# Patient Record
Sex: FEMALE | Race: WHITE | NOT HISPANIC OR LATINO | Employment: OTHER | URBAN - METROPOLITAN AREA
[De-identification: names, ages, dates, MRNs, and addresses within clinical notes are randomized per-mention and may not be internally consistent; named-entity substitution may affect disease eponyms.]

---

## 2017-08-22 ENCOUNTER — HOSPITAL ENCOUNTER (OUTPATIENT)
Dept: LAB | Facility: MEDICAL CENTER | Age: 67
End: 2017-08-22
Attending: INTERNAL MEDICINE
Payer: MEDICARE

## 2017-08-22 LAB
INR PPP: 2.38 (ref 0.87–1.13)
PROTHROMBIN TIME: 26.7 SEC (ref 12–14.6)

## 2017-08-22 PROCEDURE — 85610 PROTHROMBIN TIME: CPT

## 2017-08-22 PROCEDURE — 36415 COLL VENOUS BLD VENIPUNCTURE: CPT

## 2017-09-20 ENCOUNTER — HOSPITAL ENCOUNTER (OUTPATIENT)
Dept: LAB | Facility: MEDICAL CENTER | Age: 67
End: 2017-09-20
Attending: INTERNAL MEDICINE
Payer: MEDICARE

## 2017-09-20 LAB
INR PPP: 2.69 (ref 0.87–1.13)
PROTHROMBIN TIME: 29.4 SEC (ref 12–14.6)

## 2017-09-20 PROCEDURE — 85610 PROTHROMBIN TIME: CPT

## 2017-09-20 PROCEDURE — 36415 COLL VENOUS BLD VENIPUNCTURE: CPT

## 2017-10-19 ENCOUNTER — HOSPITAL ENCOUNTER (OUTPATIENT)
Dept: LAB | Facility: MEDICAL CENTER | Age: 67
End: 2017-10-19
Attending: INTERNAL MEDICINE
Payer: MEDICARE

## 2017-10-19 LAB
INR PPP: 2.86 (ref 0.87–1.13)
PROTHROMBIN TIME: 30.9 SEC (ref 12–14.6)

## 2017-10-19 PROCEDURE — 85610 PROTHROMBIN TIME: CPT

## 2017-10-19 PROCEDURE — 36415 COLL VENOUS BLD VENIPUNCTURE: CPT

## 2017-11-18 ENCOUNTER — HOSPITAL ENCOUNTER (OUTPATIENT)
Dept: LAB | Facility: MEDICAL CENTER | Age: 67
End: 2017-11-18
Attending: INTERNAL MEDICINE
Payer: MEDICARE

## 2017-11-18 LAB
INR PPP: 3.42 (ref 0.87–1.13)
PROTHROMBIN TIME: 34.2 SEC (ref 12–14.6)

## 2017-11-18 PROCEDURE — 36415 COLL VENOUS BLD VENIPUNCTURE: CPT

## 2017-11-18 PROCEDURE — 85610 PROTHROMBIN TIME: CPT

## 2017-12-11 ENCOUNTER — HOSPITAL ENCOUNTER (OUTPATIENT)
Dept: LAB | Facility: MEDICAL CENTER | Age: 67
End: 2017-12-11
Attending: INTERNAL MEDICINE
Payer: MEDICARE

## 2017-12-11 LAB
INR PPP: 3.01 (ref 0.87–1.13)
PROTHROMBIN TIME: 30.9 SEC (ref 12–14.6)

## 2017-12-11 PROCEDURE — 85610 PROTHROMBIN TIME: CPT

## 2017-12-11 PROCEDURE — 36415 COLL VENOUS BLD VENIPUNCTURE: CPT

## 2018-01-16 ENCOUNTER — HOSPITAL ENCOUNTER (OUTPATIENT)
Dept: LAB | Facility: MEDICAL CENTER | Age: 68
End: 2018-01-16
Attending: INTERNAL MEDICINE
Payer: MEDICARE

## 2018-01-16 LAB
INR PPP: 2.91 (ref 0.87–1.13)
PROTHROMBIN TIME: 30.1 SEC (ref 12–14.6)

## 2018-01-16 PROCEDURE — 36415 COLL VENOUS BLD VENIPUNCTURE: CPT

## 2018-01-16 PROCEDURE — 85610 PROTHROMBIN TIME: CPT

## 2018-02-20 ENCOUNTER — HOSPITAL ENCOUNTER (OUTPATIENT)
Dept: LAB | Facility: MEDICAL CENTER | Age: 68
End: 2018-02-20
Attending: PATHOLOGY
Payer: MEDICARE

## 2018-02-20 LAB
INR PPP: 2.7 (ref 0.87–1.13)
PROTHROMBIN TIME: 28.4 SEC (ref 12–14.6)

## 2018-02-20 PROCEDURE — 85610 PROTHROMBIN TIME: CPT

## 2018-02-20 PROCEDURE — 36415 COLL VENOUS BLD VENIPUNCTURE: CPT

## 2018-03-19 ENCOUNTER — HOSPITAL ENCOUNTER (OUTPATIENT)
Dept: LAB | Facility: MEDICAL CENTER | Age: 68
End: 2018-03-19
Attending: INTERNAL MEDICINE
Payer: MEDICARE

## 2018-03-19 LAB
INR PPP: 3.43 (ref 0.87–1.13)
PROTHROMBIN TIME: 34.3 SEC (ref 12–14.6)

## 2018-03-19 PROCEDURE — 36415 COLL VENOUS BLD VENIPUNCTURE: CPT

## 2018-03-19 PROCEDURE — 85610 PROTHROMBIN TIME: CPT

## 2018-04-03 ENCOUNTER — OFFICE VISIT (OUTPATIENT)
Dept: URGENT CARE | Facility: PHYSICIAN GROUP | Age: 68
End: 2018-04-03
Payer: MEDICARE

## 2018-04-03 VITALS
HEIGHT: 65 IN | OXYGEN SATURATION: 92 % | RESPIRATION RATE: 16 BRPM | DIASTOLIC BLOOD PRESSURE: 90 MMHG | SYSTOLIC BLOOD PRESSURE: 160 MMHG | HEART RATE: 77 BPM | BODY MASS INDEX: 46.65 KG/M2 | TEMPERATURE: 99.1 F | WEIGHT: 280 LBS

## 2018-04-03 DIAGNOSIS — H10.022 PINK EYE DISEASE OF LEFT EYE: ICD-10-CM

## 2018-04-03 DIAGNOSIS — J06.9 VIRAL URI WITH COUGH: ICD-10-CM

## 2018-04-03 LAB
FLUAV+FLUBV AG SPEC QL IA: NEGATIVE
INT CON NEG: NORMAL
INT CON POS: NORMAL

## 2018-04-03 PROCEDURE — 99204 OFFICE O/P NEW MOD 45 MIN: CPT | Performed by: FAMILY MEDICINE

## 2018-04-03 PROCEDURE — 87804 INFLUENZA ASSAY W/OPTIC: CPT | Performed by: FAMILY MEDICINE

## 2018-04-03 RX ORDER — BENZONATATE 200 MG/1
200 CAPSULE ORAL 3 TIMES DAILY PRN
Qty: 60 CAP | Refills: 0 | Status: SHIPPED | OUTPATIENT
Start: 2018-04-03 | End: 2018-10-12

## 2018-04-03 RX ORDER — POLYMYXIN B SULFATE AND TRIMETHOPRIM 1; 10000 MG/ML; [USP'U]/ML
1 SOLUTION OPHTHALMIC EVERY 4 HOURS
Qty: 10 ML | Refills: 0 | Status: SHIPPED | OUTPATIENT
Start: 2018-04-03 | End: 2018-10-12

## 2018-04-03 ASSESSMENT — PAIN SCALES - GENERAL: PAINLEVEL: NO PAIN

## 2018-04-04 NOTE — PROGRESS NOTES
CC:  cough        Cough  This is a new problem. The current episode started 2 days ago. The problem has been unchanged. The problem occurs constantly. The cough is dry. Associated symptoms include : bilat eye redness and some crusty d/c, fatigue, sinus congestion, but she denies any:  muscle aches, fever. Pertinent negatives include no    nausea, vomiting, diarrhea, sweats, weight loss or wheezing. Nothing aggravates the symptoms.  Patient has tried nothing for the symptoms. There is no history of asthma.         Past med hx - hx pulmonary embolus, HTN     Social History   Substance Use Topics   • Smoking status: Never Smoker   • Smokeless tobacco: Never Used   • Alcohol use No         Current Outpatient Prescriptions on File Prior to Visit   Medication Sig Dispense Refill   • lisinopril (PRINIVIL) 20 MG TABS Take 20 mg by mouth every day.     • hydrochlorothiazide (HYDRODIURIL) 25 MG TABS Take 25 mg by mouth every day.     • warfarin (COUMADIN) 2.5 MG TABS Take 2.5 mg by mouth 2 Times a Day.     • oxycodone-acetaminophen (PERCOCET) 5-325 MG TABS Take 1-2 Tabs by mouth every four hours as needed. 20 Tab 0   • ondansetron (ZOFRAN ODT) 4 MG TBDP Take 1 Tab by mouth every 8 hours as needed for Nausea/Vomiting. 20 Tab 0     No current facility-administered medications on file prior to visit.               Family history was reviewed and not pertinent       Review of Systems   Constitutional: Negative for fever and weight loss.   HENT: negative for otalgia, sore throat  Cardiovascular - denies chest pain or dyspnea  Respiratory: Positive for cough.  .  Negative for wheezing.    Neurological: + for headaches.   Denies vertigo  Ext - denies joint pain or leg swelling    GI - denies nausea, vomiting or diarrhea   - denies dysuria, d/c  Psych - denies depression, anxiety  Skin - denies rash or itching  10 point ROS otherwise negative, except per HPI             Objective:     Blood pressure 160/90, pulse 77, temperature  "37.3 °C (99.1 °F), resp. rate 16, height 1.651 m (5' 5\"), weight (!) 127 kg (280 lb), SpO2 92 %.    Physical Exam   Constitutional: patient is oriented to person, place, and time. Patient appears well-developed and well-nourished. No distress.   HENT:   Head: Normocephalic and atraumatic.   Right Ear: External ear normal.   Left Ear: External ear normal.   TMs both clr  Nose: Mucosal edema  present. Right sinus exhibits no maxillary sinus tenderness. Left sinus exhibits no maxillary sinus tenderness.   Mouth/Throat: Mucous membranes are normal. No oral lesions.  No posterior pharyngeal erythema.  No oropharyngeal exudate or posterior oropharyngeal edema.   Eyes: Conjunctivae both injected and also with some purulent d/c.   EOMI and PERRLA   No scleral icterus.   Neck: Normal range of motion. Neck supple. No tracheal deviation present.   Cardiovascular: Normal rate, regular rhythm and normal heart sounds.  Exam reveals no friction rub.    Pulmonary/Chest: Effort normal. No respiratory distress. Patient has no wheezes or rhonchi. Patient has no rales.    Musculoskeletal:  exhibits no edema.   Lymphadenopathy:     Patient has no cervical adenopathy.      Neurological: patient is alert and oriented to person, place, and time.   Skin: Skin is warm and dry. No rash noted. No erythema.   Psychiatric: patient  has a normal mood and affect.  behavior is normal.   Nursing note and vitals reviewed.              Assessment/Plan:       1. Viral URI with cough  Rapid influenza neg  - benzonatate (TESSALON) 200 MG capsule; Take 1 Cap by mouth 3 times a day as needed for Cough.  Dispense: 60 Cap; Refill: 0    2. Pink eye disease of left eye     - polymixin-trimethoprim (POLYTRIM) 45063-5.1 UNIT/ML-% Solution; Place 1 Drop in left eye every 4 hours.  Dispense: 10 mL; Refill: 0       Follow up in one week if no improvement    "

## 2018-04-09 ENCOUNTER — HOSPITAL ENCOUNTER (OUTPATIENT)
Dept: LAB | Facility: MEDICAL CENTER | Age: 68
End: 2018-04-09
Attending: INTERNAL MEDICINE
Payer: MEDICARE

## 2018-04-09 LAB
INR PPP: 3.37 (ref 0.87–1.13)
PROTHROMBIN TIME: 33.8 SEC (ref 12–14.6)

## 2018-04-09 PROCEDURE — 85610 PROTHROMBIN TIME: CPT

## 2018-04-09 PROCEDURE — 36415 COLL VENOUS BLD VENIPUNCTURE: CPT

## 2018-04-23 ENCOUNTER — HOSPITAL ENCOUNTER (OUTPATIENT)
Dept: LAB | Facility: MEDICAL CENTER | Age: 68
End: 2018-04-23
Attending: INTERNAL MEDICINE
Payer: MEDICARE

## 2018-04-23 LAB
INR PPP: 2.79 (ref 0.87–1.13)
PROTHROMBIN TIME: 29.1 SEC (ref 12–14.6)

## 2018-04-23 PROCEDURE — 36415 COLL VENOUS BLD VENIPUNCTURE: CPT

## 2018-04-23 PROCEDURE — 85610 PROTHROMBIN TIME: CPT

## 2018-05-21 ENCOUNTER — HOSPITAL ENCOUNTER (OUTPATIENT)
Dept: LAB | Facility: MEDICAL CENTER | Age: 68
End: 2018-05-21
Attending: INTERNAL MEDICINE
Payer: MEDICARE

## 2018-05-21 LAB
INR PPP: 2.85 (ref 0.87–1.13)
PROTHROMBIN TIME: 29.6 SEC (ref 12–14.6)

## 2018-05-21 PROCEDURE — 36415 COLL VENOUS BLD VENIPUNCTURE: CPT

## 2018-05-21 PROCEDURE — 85610 PROTHROMBIN TIME: CPT

## 2018-06-18 ENCOUNTER — HOSPITAL ENCOUNTER (OUTPATIENT)
Dept: LAB | Facility: MEDICAL CENTER | Age: 68
End: 2018-06-18
Attending: INTERNAL MEDICINE
Payer: MEDICARE

## 2018-06-18 LAB
INR PPP: 2.92 (ref 0.87–1.13)
PROTHROMBIN TIME: 30.2 SEC (ref 12–14.6)

## 2018-06-18 PROCEDURE — 36415 COLL VENOUS BLD VENIPUNCTURE: CPT

## 2018-06-18 PROCEDURE — 85610 PROTHROMBIN TIME: CPT

## 2018-07-16 ENCOUNTER — HOSPITAL ENCOUNTER (OUTPATIENT)
Dept: LAB | Facility: MEDICAL CENTER | Age: 68
End: 2018-07-16
Attending: INTERNAL MEDICINE
Payer: MEDICARE

## 2018-07-16 LAB
INR PPP: 3.15 (ref 0.87–1.13)
PROTHROMBIN TIME: 32.1 SEC (ref 12–14.6)

## 2018-07-16 PROCEDURE — 85610 PROTHROMBIN TIME: CPT

## 2018-07-16 PROCEDURE — 36415 COLL VENOUS BLD VENIPUNCTURE: CPT

## 2018-08-15 ENCOUNTER — HOSPITAL ENCOUNTER (OUTPATIENT)
Dept: LAB | Facility: MEDICAL CENTER | Age: 68
End: 2018-08-15
Attending: INTERNAL MEDICINE
Payer: MEDICARE

## 2018-08-15 LAB
INR PPP: 3.15 (ref 0.87–1.13)
PROTHROMBIN TIME: 32.1 SEC (ref 12–14.6)

## 2018-08-15 PROCEDURE — 85610 PROTHROMBIN TIME: CPT

## 2018-08-15 PROCEDURE — 36415 COLL VENOUS BLD VENIPUNCTURE: CPT

## 2018-09-17 ENCOUNTER — HOSPITAL ENCOUNTER (OUTPATIENT)
Dept: LAB | Facility: MEDICAL CENTER | Age: 68
End: 2018-09-17
Attending: INTERNAL MEDICINE
Payer: MEDICARE

## 2018-09-17 LAB
INR PPP: 3.24 (ref 0.87–1.13)
PROTHROMBIN TIME: 32.8 SEC (ref 12–14.6)

## 2018-09-17 PROCEDURE — 36415 COLL VENOUS BLD VENIPUNCTURE: CPT

## 2018-09-17 PROCEDURE — 85610 PROTHROMBIN TIME: CPT

## 2018-10-12 ENCOUNTER — APPOINTMENT (OUTPATIENT)
Dept: RADIOLOGY | Facility: MEDICAL CENTER | Age: 68
DRG: 813 | End: 2018-10-12
Attending: EMERGENCY MEDICINE
Payer: MEDICARE

## 2018-10-12 ENCOUNTER — HOSPITAL ENCOUNTER (INPATIENT)
Facility: MEDICAL CENTER | Age: 68
LOS: 2 days | DRG: 813 | End: 2018-10-14
Attending: EMERGENCY MEDICINE | Admitting: HOSPITALIST
Payer: MEDICARE

## 2018-10-12 DIAGNOSIS — Z79.01 ANTICOAGULATED ON COUMADIN: ICD-10-CM

## 2018-10-12 DIAGNOSIS — D62 ACUTE BLOOD LOSS ANEMIA: ICD-10-CM

## 2018-10-12 DIAGNOSIS — K92.1 HEMATOCHEZIA: ICD-10-CM

## 2018-10-12 DIAGNOSIS — K57.32 SIGMOID DIVERTICULITIS: ICD-10-CM

## 2018-10-12 DIAGNOSIS — K57.33 DIVERTICULITIS OF LARGE INTESTINE WITHOUT PERFORATION OR ABSCESS WITH BLEEDING: ICD-10-CM

## 2018-10-12 DIAGNOSIS — Z86.711 HISTORY OF PULMONARY EMBOLISM: ICD-10-CM

## 2018-10-12 PROBLEM — D68.9 COAGULOPATHY (HCC): Status: ACTIVE | Noted: 2018-10-12

## 2018-10-12 PROBLEM — K62.5 RECTAL BLEEDING: Status: ACTIVE | Noted: 2018-10-12

## 2018-10-12 PROBLEM — R73.9 HYPERGLYCEMIA: Status: ACTIVE | Noted: 2018-10-12

## 2018-10-12 PROBLEM — E66.09 OBESITY DUE TO EXCESS CALORIES: Status: ACTIVE | Noted: 2018-10-12

## 2018-10-12 PROBLEM — K57.92 ACUTE DIVERTICULITIS: Status: ACTIVE | Noted: 2018-10-12

## 2018-10-12 PROBLEM — Q21.0 VSD (VENTRICULAR SEPTAL DEFECT): Status: ACTIVE | Noted: 2018-10-12

## 2018-10-12 PROBLEM — I10 ESSENTIAL HYPERTENSION: Status: ACTIVE | Noted: 2018-10-12

## 2018-10-12 LAB
ABO GROUP BLD: NORMAL
ABO GROUP BLD: NORMAL
ALBUMIN SERPL BCP-MCNC: 3.8 G/DL (ref 3.2–4.9)
ALBUMIN/GLOB SERPL: 1.1 G/DL
ALP SERPL-CCNC: 80 U/L (ref 30–99)
ALT SERPL-CCNC: 14 U/L (ref 2–50)
ANION GAP SERPL CALC-SCNC: 10 MMOL/L (ref 0–11.9)
APTT PPP: 45.1 SEC (ref 24.7–36)
AST SERPL-CCNC: 15 U/L (ref 12–45)
BARCODED ABORH UBTYP: 6200
BARCODED PRD CODE UBPRD: NORMAL
BARCODED UNIT NUM UBUNT: NORMAL
BASOPHILS # BLD AUTO: 0.6 % (ref 0–1.8)
BASOPHILS # BLD: 0.05 K/UL (ref 0–0.12)
BILIRUB SERPL-MCNC: 0.4 MG/DL (ref 0.1–1.5)
BLD GP AB SCN SERPL QL: NORMAL
BUN SERPL-MCNC: 13 MG/DL (ref 8–22)
CALCIUM SERPL-MCNC: 9.2 MG/DL (ref 8.5–10.5)
CFT BLD TEG: 4.8 MIN (ref 5–10)
CHLORIDE SERPL-SCNC: 106 MMOL/L (ref 96–112)
CLOT ANGLE BLD TEG: 71.2 DEGREES (ref 53–72)
CLOT LYSIS 30M P MA LENFR BLD TEG: 0 % (ref 0–8)
CO2 SERPL-SCNC: 25 MMOL/L (ref 20–33)
COMPONENT FT 8504FT: NORMAL
COMPONENT FT 8504FT: NORMAL
COMPONENT R 8504R: NORMAL
COMPONENT R 8504R: NORMAL
CREAT SERPL-MCNC: 0.66 MG/DL (ref 0.5–1.4)
CT.EXTRINSIC BLD ROTEM: 1.3 MIN (ref 1–3)
EOSINOPHIL # BLD AUTO: 0.25 K/UL (ref 0–0.51)
EOSINOPHIL NFR BLD: 3.2 % (ref 0–6.9)
ERYTHROCYTE [DISTWIDTH] IN BLOOD BY AUTOMATED COUNT: 47.9 FL (ref 35.9–50)
ERYTHROCYTE [DISTWIDTH] IN BLOOD BY AUTOMATED COUNT: 48.9 FL (ref 35.9–50)
EST. AVERAGE GLUCOSE BLD GHB EST-MCNC: 128 MG/DL
GLOBULIN SER CALC-MCNC: 3.4 G/DL (ref 1.9–3.5)
GLUCOSE SERPL-MCNC: 114 MG/DL (ref 65–99)
HBA1C MFR BLD: 6.1 % (ref 0–5.6)
HCT VFR BLD AUTO: 31.2 % (ref 37–47)
HCT VFR BLD AUTO: 39.2 % (ref 37–47)
HGB BLD-MCNC: 10.1 G/DL (ref 12–16)
HGB BLD-MCNC: 11 G/DL (ref 12–16)
HGB BLD-MCNC: 12.3 G/DL (ref 12–16)
HGB BLD-MCNC: 9.7 G/DL (ref 12–16)
IMM GRANULOCYTES # BLD AUTO: 0.03 K/UL (ref 0–0.11)
IMM GRANULOCYTES NFR BLD AUTO: 0.4 % (ref 0–0.9)
INR PPP: 1.82 (ref 0.87–1.13)
INR PPP: 3.09 (ref 0.87–1.13)
LACTATE BLD-SCNC: 1.8 MMOL/L (ref 0.5–2)
LYMPHOCYTES # BLD AUTO: 2.06 K/UL (ref 1–4.8)
LYMPHOCYTES NFR BLD: 26.7 % (ref 22–41)
MCF BLD TEG: 70.7 MM (ref 50–70)
MCH RBC QN AUTO: 29.6 PG (ref 27–33)
MCH RBC QN AUTO: 30.6 PG (ref 27–33)
MCHC RBC AUTO-ENTMCNC: 31.4 G/DL (ref 33.6–35)
MCHC RBC AUTO-ENTMCNC: 32.4 G/DL (ref 33.6–35)
MCV RBC AUTO: 94.2 FL (ref 81.4–97.8)
MCV RBC AUTO: 94.5 FL (ref 81.4–97.8)
MONOCYTES # BLD AUTO: 0.6 K/UL (ref 0–0.85)
MONOCYTES NFR BLD AUTO: 7.8 % (ref 0–13.4)
NEUTROPHILS # BLD AUTO: 4.73 K/UL (ref 2–7.15)
NEUTROPHILS NFR BLD: 61.3 % (ref 44–72)
NRBC # BLD AUTO: 0 K/UL
NRBC BLD-RTO: 0 /100 WBC
PA AA BLD-ACNC: 0 %
PA ADP BLD-ACNC: 4.3 %
PLATELET # BLD AUTO: 235 K/UL (ref 164–446)
PLATELET # BLD AUTO: 270 K/UL (ref 164–446)
PMV BLD AUTO: 9.2 FL (ref 9–12.9)
PMV BLD AUTO: 9.3 FL (ref 9–12.9)
POTASSIUM SERPL-SCNC: 3.7 MMOL/L (ref 3.6–5.5)
PRODUCT TYPE UPROD: NORMAL
PROT SERPL-MCNC: 7.2 G/DL (ref 6–8.2)
PROTHROMBIN TIME: 21.2 SEC (ref 12–14.6)
PROTHROMBIN TIME: 31.9 SEC (ref 12–14.6)
RBC # BLD AUTO: 3.3 M/UL (ref 4.2–5.4)
RBC # BLD AUTO: 4.16 M/UL (ref 4.2–5.4)
RH BLD: NORMAL
RH BLD: NORMAL
SODIUM SERPL-SCNC: 141 MMOL/L (ref 135–145)
TEG ALGORITHM TGALG: ABNORMAL
UNIT STATUS USTAT: NORMAL
WBC # BLD AUTO: 7.7 K/UL (ref 4.8–10.8)
WBC # BLD AUTO: 8.2 K/UL (ref 4.8–10.8)

## 2018-10-12 PROCEDURE — 700105 HCHG RX REV CODE 258: Performed by: EMERGENCY MEDICINE

## 2018-10-12 PROCEDURE — P9017 PLASMA 1 DONOR FRZ W/IN 8 HR: HCPCS

## 2018-10-12 PROCEDURE — 85384 FIBRINOGEN ACTIVITY: CPT

## 2018-10-12 PROCEDURE — 85027 COMPLETE CBC AUTOMATED: CPT

## 2018-10-12 PROCEDURE — P9016 RBC LEUKOCYTES REDUCED: HCPCS | Mod: 91

## 2018-10-12 PROCEDURE — 83036 HEMOGLOBIN GLYCOSYLATED A1C: CPT

## 2018-10-12 PROCEDURE — 36415 COLL VENOUS BLD VENIPUNCTURE: CPT

## 2018-10-12 PROCEDURE — 86850 RBC ANTIBODY SCREEN: CPT

## 2018-10-12 PROCEDURE — 700105 HCHG RX REV CODE 258: Performed by: HOSPITALIST

## 2018-10-12 PROCEDURE — 700111 HCHG RX REV CODE 636 W/ 250 OVERRIDE (IP): Performed by: HOSPITALIST

## 2018-10-12 PROCEDURE — 74177 CT ABD & PELVIS W/CONTRAST: CPT

## 2018-10-12 PROCEDURE — 85730 THROMBOPLASTIN TIME PARTIAL: CPT

## 2018-10-12 PROCEDURE — 82272 OCCULT BLD FECES 1-3 TESTS: CPT

## 2018-10-12 PROCEDURE — 36430 TRANSFUSION BLD/BLD COMPNT: CPT

## 2018-10-12 PROCEDURE — 85018 HEMOGLOBIN: CPT | Mod: 91

## 2018-10-12 PROCEDURE — 770022 HCHG ROOM/CARE - ICU (200)

## 2018-10-12 PROCEDURE — 85347 COAGULATION TIME ACTIVATED: CPT

## 2018-10-12 PROCEDURE — 96368 THER/DIAG CONCURRENT INF: CPT

## 2018-10-12 PROCEDURE — 85576 BLOOD PLATELET AGGREGATION: CPT

## 2018-10-12 PROCEDURE — 96365 THER/PROPH/DIAG IV INF INIT: CPT

## 2018-10-12 PROCEDURE — 80053 COMPREHEN METABOLIC PANEL: CPT

## 2018-10-12 PROCEDURE — 83605 ASSAY OF LACTIC ACID: CPT

## 2018-10-12 PROCEDURE — 30233K1 TRANSFUSION OF NONAUTOLOGOUS FROZEN PLASMA INTO PERIPHERAL VEIN, PERCUTANEOUS APPROACH: ICD-10-PCS | Performed by: EMERGENCY MEDICINE

## 2018-10-12 PROCEDURE — 700101 HCHG RX REV CODE 250: Performed by: EMERGENCY MEDICINE

## 2018-10-12 PROCEDURE — 700101 HCHG RX REV CODE 250: Performed by: HOSPITALIST

## 2018-10-12 PROCEDURE — 86923 COMPATIBILITY TEST ELECTRIC: CPT

## 2018-10-12 PROCEDURE — 86901 BLOOD TYPING SEROLOGIC RH(D): CPT

## 2018-10-12 PROCEDURE — 85025 COMPLETE CBC W/AUTO DIFF WBC: CPT

## 2018-10-12 PROCEDURE — 99223 1ST HOSP IP/OBS HIGH 75: CPT | Mod: AI | Performed by: HOSPITALIST

## 2018-10-12 PROCEDURE — 99291 CRITICAL CARE FIRST HOUR: CPT | Performed by: INTERNAL MEDICINE

## 2018-10-12 PROCEDURE — 700111 HCHG RX REV CODE 636 W/ 250 OVERRIDE (IP): Performed by: EMERGENCY MEDICINE

## 2018-10-12 PROCEDURE — 99291 CRITICAL CARE FIRST HOUR: CPT

## 2018-10-12 PROCEDURE — 85610 PROTHROMBIN TIME: CPT | Mod: 91

## 2018-10-12 PROCEDURE — 700117 HCHG RX CONTRAST REV CODE 255: Performed by: EMERGENCY MEDICINE

## 2018-10-12 PROCEDURE — 30233N1 TRANSFUSION OF NONAUTOLOGOUS RED BLOOD CELLS INTO PERIPHERAL VEIN, PERCUTANEOUS APPROACH: ICD-10-PCS | Performed by: EMERGENCY MEDICINE

## 2018-10-12 PROCEDURE — 86900 BLOOD TYPING SEROLOGIC ABO: CPT

## 2018-10-12 RX ORDER — ONDANSETRON 4 MG/1
4 TABLET, ORALLY DISINTEGRATING ORAL EVERY 4 HOURS PRN
Status: DISCONTINUED | OUTPATIENT
Start: 2018-10-12 | End: 2018-10-14 | Stop reason: HOSPADM

## 2018-10-12 RX ORDER — WARFARIN SODIUM 5 MG/1
5-7.5 TABLET ORAL
Status: ON HOLD | COMMUNITY
End: 2018-10-14

## 2018-10-12 RX ORDER — ACETAMINOPHEN 325 MG/1
650 TABLET ORAL EVERY 6 HOURS PRN
Status: DISCONTINUED | OUTPATIENT
Start: 2018-10-12 | End: 2018-10-14 | Stop reason: HOSPADM

## 2018-10-12 RX ORDER — ONDANSETRON 2 MG/ML
4 INJECTION INTRAMUSCULAR; INTRAVENOUS EVERY 4 HOURS PRN
Status: DISCONTINUED | OUTPATIENT
Start: 2018-10-12 | End: 2018-10-14 | Stop reason: HOSPADM

## 2018-10-12 RX ORDER — OXYCODONE HYDROCHLORIDE AND ACETAMINOPHEN 5; 325 MG/1; MG/1
1 TABLET ORAL EVERY 6 HOURS PRN
Status: DISCONTINUED | OUTPATIENT
Start: 2018-10-12 | End: 2018-10-14 | Stop reason: HOSPADM

## 2018-10-12 RX ORDER — CIPROFLOXACIN 2 MG/ML
400 INJECTION, SOLUTION INTRAVENOUS ONCE
Status: DISCONTINUED | OUTPATIENT
Start: 2018-10-12 | End: 2018-10-12

## 2018-10-12 RX ORDER — SODIUM CHLORIDE 9 MG/ML
INJECTION, SOLUTION INTRAVENOUS CONTINUOUS
Status: DISCONTINUED | OUTPATIENT
Start: 2018-10-12 | End: 2018-10-13

## 2018-10-12 RX ORDER — SODIUM CHLORIDE 9 MG/ML
1000 INJECTION, SOLUTION INTRAVENOUS ONCE
Status: COMPLETED | OUTPATIENT
Start: 2018-10-12 | End: 2018-10-12

## 2018-10-12 RX ORDER — BENZONATATE 100 MG/1
200 CAPSULE ORAL 3 TIMES DAILY PRN
Status: DISCONTINUED | OUTPATIENT
Start: 2018-10-12 | End: 2018-10-14 | Stop reason: HOSPADM

## 2018-10-12 RX ORDER — PHYTONADIONE 10 MG/ML
10 INJECTION, EMULSION INTRAMUSCULAR; INTRAVENOUS; SUBCUTANEOUS ONCE
Status: DISCONTINUED | OUTPATIENT
Start: 2018-10-12 | End: 2018-10-12

## 2018-10-12 RX ADMIN — CEFTRIAXONE SODIUM 2 G: 2 INJECTION, POWDER, FOR SOLUTION INTRAMUSCULAR; INTRAVENOUS at 10:46

## 2018-10-12 RX ADMIN — PHYTONADIONE 10 MG: 10 INJECTION, EMULSION INTRAMUSCULAR; INTRAVENOUS; SUBCUTANEOUS at 10:34

## 2018-10-12 RX ADMIN — METRONIDAZOLE 500 MG: 500 INJECTION, SOLUTION INTRAVENOUS at 11:49

## 2018-10-12 RX ADMIN — METRONIDAZOLE 500 MG: 500 INJECTION, SOLUTION INTRAVENOUS at 18:05

## 2018-10-12 RX ADMIN — SODIUM CHLORIDE 1000 ML: 9 INJECTION, SOLUTION INTRAVENOUS at 08:15

## 2018-10-12 RX ADMIN — SODIUM CHLORIDE: 9 INJECTION, SOLUTION INTRAVENOUS at 11:45

## 2018-10-12 RX ADMIN — SODIUM CHLORIDE: 9 INJECTION, SOLUTION INTRAVENOUS at 22:06

## 2018-10-12 RX ADMIN — IOHEXOL 100 ML: 350 INJECTION, SOLUTION INTRAVENOUS at 09:29

## 2018-10-12 ASSESSMENT — ENCOUNTER SYMPTOMS
CONSTITUTIONAL NEGATIVE: 1
RECTAL PAIN: 0
HEADACHES: 0
VOMITING: 0
FEVER: 0
BRUISES/BLEEDS EASILY: 1
COUGH: 0
DIARRHEA: 1
BACK PAIN: 0
NUMBNESS: 0
NERVOUS/ANXIOUS: 0
NAUSEA: 0
FATIGUE: 1
LIGHT-HEADEDNESS: 1
APPETITE CHANGE: 0
ACTIVITY CHANGE: 0
ADENOPATHY: 0
FLANK PAIN: 0
PHOTOPHOBIA: 0
CHILLS: 0
SINUS PRESSURE: 0
POLYDIPSIA: 0
BLOOD IN STOOL: 1
PALPITATIONS: 0
WEAKNESS: 0
SHORTNESS OF BREATH: 0
DIZZINESS: 1
ABDOMINAL PAIN: 1
CONFUSION: 0
NECK PAIN: 0

## 2018-10-12 ASSESSMENT — COGNITIVE AND FUNCTIONAL STATUS - GENERAL
SUGGESTED CMS G CODE MODIFIER DAILY ACTIVITY: CH
MOBILITY SCORE: 24
DAILY ACTIVITIY SCORE: 24
SUGGESTED CMS G CODE MODIFIER MOBILITY: CH

## 2018-10-12 ASSESSMENT — PATIENT HEALTH QUESTIONNAIRE - PHQ9
SUM OF ALL RESPONSES TO PHQ9 QUESTIONS 1 AND 2: 0
SUM OF ALL RESPONSES TO PHQ9 QUESTIONS 1 AND 2: 0
1. LITTLE INTEREST OR PLEASURE IN DOING THINGS: NOT AT ALL
2. FEELING DOWN, DEPRESSED, IRRITABLE, OR HOPELESS: NOT AT ALL
1. LITTLE INTEREST OR PLEASURE IN DOING THINGS: NOT AT ALL
2. FEELING DOWN, DEPRESSED, IRRITABLE, OR HOPELESS: NOT AT ALL

## 2018-10-12 ASSESSMENT — COPD QUESTIONNAIRES
DURING THE PAST 4 WEEKS HOW MUCH DID YOU FEEL SHORT OF BREATH: NONE/LITTLE OF THE TIME
DO YOU EVER COUGH UP ANY MUCUS OR PHLEGM?: NO/ONLY WITH OCCASIONAL COLDS OR INFECTIONS
HAVE YOU SMOKED AT LEAST 100 CIGARETTES IN YOUR ENTIRE LIFE: NO/DON'T KNOW
COPD SCREENING SCORE: 2

## 2018-10-12 ASSESSMENT — PAIN SCALES - GENERAL
PAINLEVEL_OUTOF10: 0

## 2018-10-12 ASSESSMENT — LIFESTYLE VARIABLES
DO YOU DRINK ALCOHOL: NO
EVER_SMOKED: NEVER

## 2018-10-12 NOTE — ASSESSMENT & PLAN NOTE
Chronic for pulmonary embolism  Status post vitamin K and FFP  Holding Coumadin for now until cleared by GI as outpatient

## 2018-10-12 NOTE — ASSESSMENT & PLAN NOTE
Continue antibiotic (changed to p.o. Augmentin), analgesics as needed  Diet modification instructions given

## 2018-10-12 NOTE — ASSESSMENT & PLAN NOTE
Following knee replacement surgery 5 years ago.  She had a DVT 25 years ago.  He has had an IVC filter placed in the past.

## 2018-10-12 NOTE — ED NOTES
Report given to Raffaele MULTANI at bedside, may call Sharla MULTANI at 2001 for additional questions. Patient to ICU with RN and tech with telemonitoring with belongings and family by adebayo.

## 2018-10-12 NOTE — ASSESSMENT & PLAN NOTE
Provoked after knee surgery 6 years ago  Holding anticoagulants for now until cleared outpatient by GI

## 2018-10-12 NOTE — CARE PLAN
Problem: Hemodynamic Status  Goal: Vital Signs and Fluid Balance Management    Intervention: Hemodynamic Monitoring  Hgb monitoring q 8 hours.  2 units PRBC and 2 units FFP administered this morning in ED.        Problem: Risk for Deep Vein Thrombosis/Venous Thromboembolism  Goal: DVT/VTE Prevention Measures in Place    Intervention: Intermittent sequential compression device in place 23 hours per day  SCDs in place.  Home Coumadin discontinued r/t GIB.  Encouraging patient to pump feet while in bed.

## 2018-10-12 NOTE — PROGRESS NOTES
Dr. Reagan, GI MD at bedside. Ok to start patient on clear liquid diet.  No interventions planned at this time.

## 2018-10-12 NOTE — ED NOTES
Inpatient Anticoagulation Service Note    Date: 10/12/2018  Reason for Anticoagulation: Pulmonary Embolism        Hemoglobin Value: 12.3  Hematocrit Value: 39.2  Lab Platelet Value: 270    INR from last 7 days     Date/Time INR Value    10/12/18 0752 (!)  3.09        Dose from last 7 days     Date/Time Dose (mg)    10/12/18 0900  42.5        Average Dose (mg): 6.07    Verified home dose with pt: 7.5 mg on Sat, Tues, Thurs and 5 mg all other days.  Pt currently supra-therapeutic and experiencing red bloody stools and abdominal cramping.    Plan:  Hold warfarin dose and re-check INR tomorrow.  Education Material Provided?: No  Pharmacist suggested discharge dosin.5 mg on Sat, Tues, Thurs and 5 mg all other days.     Qian Gonzalez

## 2018-10-12 NOTE — CONSULTS
Critical Care Consultation    Date of consult: 10/12/2018    Referring Physician  Emili Avelar D.O.    Reason for Consultation  Gastrointestinal hemorrhage    History of Presenting Illness  68 y.o. female who presented 10/12/2018 with a past medical history significant for pulmonary embolism on Coumadin as well as a VSD with intermittent congestive heart failure who presents to the emergency department today after a large bloody bowel movement with associated left lower quadrant abdominal pain that started this morning.  Her abdominal was described as pure bright red blood and she had associated lightheadedness, diaphoresis, dizziness.  In the emergency department she was found to have 2 other episodes of moderate amount of bloody stool and had a drop in her hemoglobin from 12-10.  She was given vitamin K as well as 2 units of FFP and is receiving her second unit of packed red cells at the time of my evaluation.  She states the left lower quadrant abdominal pain has not resolved after the bowel movements and she denies any prior gastrointestinal hemorrhages nor knowledge of diverticulosis.  No family history that she is aware of of colon cancer and she has never had a colonoscopy herself.  She denies any hematemesis no nausea.    Code Status  Full Code    Review of Systems  Review of Systems   Constitutional: Positive for fatigue. Negative for activity change, appetite change, chills and fever.   HENT: Negative for nosebleeds and sinus pressure.    Eyes: Negative for photophobia.   Respiratory: Negative for cough and shortness of breath.    Cardiovascular: Positive for leg swelling. Negative for chest pain and palpitations.   Gastrointestinal: Positive for abdominal pain, blood in stool and diarrhea. Negative for nausea, rectal pain and vomiting.   Endocrine: Negative for polydipsia.   Genitourinary: Negative for difficulty urinating, flank pain and vaginal bleeding.   Musculoskeletal: Negative for back pain  and neck pain.   Skin: Positive for pallor.   Allergic/Immunologic: Negative for immunocompromised state.   Neurological: Positive for dizziness and light-headedness. Negative for weakness, numbness and headaches.   Hematological: Negative for adenopathy. Bruises/bleeds easily.   Psychiatric/Behavioral: Negative for confusion. The patient is not nervous/anxious.    All other systems reviewed and are negative.      Past Medical History   has a past medical history of Hypertension and VSD (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 She also has no past medical history of Diabetes (HCC) or Renal disorder.    Surgical History   has no past surgical history on file. -Hysterectomy    Family History  family history is not on file.     Social History   reports that she has never smoked. She has never used smokeless tobacco. She reports that she does not drink alcohol or use drugs.    Medications  Home Medications    **Home medications have not yet been reviewed for this encounter**       Current Facility-Administered Medications   Medication Dose Route Frequency Provider Last Rate Last Dose   • metroNIDAZOLE (FLAGYL) IVPB 500 mg  500 mg Intravenous Once Emili Avelar D.O.       • phytonadione (AQUA-MEPHYTON) 10 mg in NS 50 mL IVPB  10 mg Intravenous Once Emili Avelar, D.O. 50 mL/hr at 10/12/18 1034 10 mg at 10/12/18 1034   • benzonatate (TESSALON) capsule 200 mg  200 mg Oral TID PRN Petar Maguire M.D.       • Respiratory Care per Protocol   Nebulization Continuous RT Petar Maguire M.D.       • NS infusion   Intravenous Continuous Petar Maguire M.D.       • ondansetron (ZOFRAN) syringe/vial injection 4 mg  4 mg Intravenous Q4HRS PRN Petar Maguire M.D.       • ondansetron (ZOFRAN ODT) dispertab 4 mg  4 mg Oral Q4HRS PRN Petar Maguire M.D.       • acetaminophen (TYLENOL) tablet 650 mg  650 mg Oral Q6HRS PRN Petar Maguire M.D.       • oxyCODONE-acetaminophen  (PERCOCET) 5-325 MG per tablet 1 Tab  1 Tab Oral Q6HRS PRN Petar Maguire M.D.       • cefTRIAXone (ROCEPHIN) 2 g in  mL IVPB  2 g Intravenous Q24HRS Petar Maguire M.D. 200 mL/hr at 10/12/18 1046 2 g at 10/12/18 1046   • metroNIDAZOLE (FLAGYL) IVPB 500 mg  500 mg Intravenous Q8HRS Petar Maguire M.D.         Current Outpatient Prescriptions   Medication Sig Dispense Refill   • warfarin (COUMADIN) 5 MG Tab Take 5-7.5 mg by mouth every day. 7.5 mg on Sat, Tues and Thurs and 5 mg on all other days.     • polymixin-trimethoprim (POLYTRIM) 62762-1.1 UNIT/ML-% Solution Place 1 Drop in left eye every 4 hours. 10 mL 0   • benzonatate (TESSALON) 200 MG capsule Take 1 Cap by mouth 3 times a day as needed for Cough. 60 Cap 0   • oxycodone-acetaminophen (PERCOCET) 5-325 MG TABS Take 1-2 Tabs by mouth every four hours as needed. 20 Tab 0   • ondansetron (ZOFRAN ODT) 4 MG TBDP Take 1 Tab by mouth every 8 hours as needed for Nausea/Vomiting. 20 Tab 0   • lisinopril (PRINIVIL) 20 MG TABS Take 20 mg by mouth every day.     • hydrochlorothiazide (HYDRODIURIL) 25 MG TABS Take 25 mg by mouth every day.         Allergies  Allergies   Allergen Reactions   • Compazine Anaphylaxis       Vital Signs last 24 hours  Temp:  [35.9 °C (96.6 °F)-36.5 °C (97.7 °F)] 36.5 °C (97.7 °F)  Pulse:  [] 78  Resp:  [18-20] 18  BP: ()/() 121/65    Physical Exam  Physical Exam   Constitutional: She is oriented to person, place, and time. She appears well-developed and well-nourished. No distress.   Obese, very pleasant female   HENT:   Head: Normocephalic and atraumatic.   Mouth/Throat: Mucous membranes are pale and dry.   Eyes: Pupils are equal, round, and reactive to light. Conjunctivae are normal. No scleral icterus.   Neck: Neck supple. No JVD present. No tracheal deviation present.   Cardiovascular: Normal rate, regular rhythm and intact distal pulses.  PMI is displaced.  Exam reveals distant heart sounds.     Murmur heard.  Pulmonary/Chest: Effort normal and breath sounds normal. No respiratory distress. She has no wheezes. She has no rales.   Abdominal: Soft. Bowel sounds are normal. She exhibits no distension. There is tenderness. There is no rebound and no guarding.   Musculoskeletal: She exhibits edema. She exhibits no tenderness or deformity.   Neurological: She is alert and oriented to person, place, and time. No cranial nerve deficit.   Skin: Skin is warm and dry. She is not diaphoretic. There is pallor.   Psychiatric: She has a normal mood and affect. Her behavior is normal. Judgment and thought content normal.       Fluids    Intake/Output Summary (Last 24 hours) at 10/12/18 1102  Last data filed at 10/12/18 1057   Gross per 24 hour   Intake             2000 ml   Output             1700 ml   Net              300 ml       Laboratory  Recent Results (from the past 48 hour(s))   CBC WITH DIFFERENTIAL    Collection Time: 10/12/18  7:52 AM   Result Value Ref Range    WBC 7.7 4.8 - 10.8 K/uL    RBC 4.16 (L) 4.20 - 5.40 M/uL    Hemoglobin 12.3 12.0 - 16.0 g/dL    Hematocrit 39.2 37.0 - 47.0 %    MCV 94.2 81.4 - 97.8 fL    MCH 29.6 27.0 - 33.0 pg    MCHC 31.4 (L) 33.6 - 35.0 g/dL    RDW 48.9 35.9 - 50.0 fL    Platelet Count 270 164 - 446 K/uL    MPV 9.3 9.0 - 12.9 fL    Neutrophils-Polys 61.30 44.00 - 72.00 %    Lymphocytes 26.70 22.00 - 41.00 %    Monocytes 7.80 0.00 - 13.40 %    Eosinophils 3.20 0.00 - 6.90 %    Basophils 0.60 0.00 - 1.80 %    Immature Granulocytes 0.40 0.00 - 0.90 %    Nucleated RBC 0.00 /100 WBC    Neutrophils (Absolute) 4.73 2.00 - 7.15 K/uL    Lymphs (Absolute) 2.06 1.00 - 4.80 K/uL    Monos (Absolute) 0.60 0.00 - 0.85 K/uL    Eos (Absolute) 0.25 0.00 - 0.51 K/uL    Baso (Absolute) 0.05 0.00 - 0.12 K/uL    Immature Granulocytes (abs) 0.03 0.00 - 0.11 K/uL    NRBC (Absolute) 0.00 K/uL   COMP METABOLIC PANEL    Collection Time: 10/12/18  7:52 AM   Result Value Ref Range    Sodium 141 135 - 145  mmol/L    Potassium 3.7 3.6 - 5.5 mmol/L    Chloride 106 96 - 112 mmol/L    Co2 25 20 - 33 mmol/L    Anion Gap 10.0 0.0 - 11.9    Glucose 114 (H) 65 - 99 mg/dL    Bun 13 8 - 22 mg/dL    Creatinine 0.66 0.50 - 1.40 mg/dL    Calcium 9.2 8.5 - 10.5 mg/dL    AST(SGOT) 15 12 - 45 U/L    ALT(SGPT) 14 2 - 50 U/L    Alkaline Phosphatase 80 30 - 99 U/L    Total Bilirubin 0.4 0.1 - 1.5 mg/dL    Albumin 3.8 3.2 - 4.9 g/dL    Total Protein 7.2 6.0 - 8.2 g/dL    Globulin 3.4 1.9 - 3.5 g/dL    A-G Ratio 1.1 g/dL   PROTHROMBIN TIME (INR)    Collection Time: 10/12/18  7:52 AM   Result Value Ref Range    PT 31.9 (H) 12.0 - 14.6 sec    INR 3.09 (H) 0.87 - 1.13   APTT    Collection Time: 10/12/18  7:52 AM   Result Value Ref Range    APTT 45.1 (H) 24.7 - 36.0 sec   COD (ADULT)    Collection Time: 10/12/18  7:52 AM   Result Value Ref Range    ABO Grouping Only A     Rh Grouping Only POS     Antibody Screen-Cod NEG     Component R       R3                  Red Blood Cells3    [id number]   issued       10/12/18   09:58      Product Type Red Blood Cells LR Pheresis     Dispense Status Issued     Unit Number (Barcoded) [id number]     Product Code (Barcoded) I5531Q28     Blood Type (Barcoded) 6200     Component R       R3                  Red Blood Cells3    [id number]   issued       10/12/18   10:44      Product Type Red Blood Cells LR Pheresis     Dispense Status Issued     Unit Number (Barcoded) [id number]     Product Code (Barcoded) O8527G72     Blood Type (Barcoded) 6200    ESTIMATED GFR    Collection Time: 10/12/18  7:52 AM   Result Value Ref Range    GFR If African American >60 >60 mL/min/1.73 m 2    GFR If Non African American >60 >60 mL/min/1.73 m 2   ABO AND RH CONFIRMATION    Collection Time: 10/12/18  8:41 AM   Result Value Ref Range    ABO Confirm A     Second Rh Group POS    LACTIC ACID    Collection Time: 10/12/18  8:41 AM   Result Value Ref Range    Lactic Acid 1.8 0.5 - 2.0 mmol/L   CBC WITHOUT DIFFERENTIAL     Collection Time: 10/12/18  9:52 AM   Result Value Ref Range    WBC 8.2 4.8 - 10.8 K/uL    RBC 3.30 (L) 4.20 - 5.40 M/uL    Hemoglobin 10.1 (L) 12.0 - 16.0 g/dL    Hematocrit 31.2 (L) 37.0 - 47.0 %    MCV 94.5 81.4 - 97.8 fL    MCH 30.6 27.0 - 33.0 pg    MCHC 32.4 (L) 33.6 - 35.0 g/dL    RDW 47.9 35.9 - 50.0 fL    Platelet Count 235 164 - 446 K/uL    MPV 9.2 9.0 - 12.9 fL   FRESH FROZEN PLASMA    Collection Time: 10/12/18  9:52 AM   Result Value Ref Range    Component Ft       TA                  Thawed Plasma       [id number]   issued       10/12/18   10:03      Product Type Thawed Apheresis Plasma     Dispense Status Issued     Unit Number (Barcoded) [id number]     Product Code (Barcoded) J9331D47     Blood Type (Barcoded) 6200     Component Ft       TA                  Thawed Plasma       [id number]   issued       10/12/18   10:32      Product Type Thawed Apheresis Plasma     Dispense Status Issued     Unit Number (Barcoded) [id number]     Product Code (Barcoded) [id number]     Blood Type (Barcoded) 6200        Imaging  CT-ABDOMEN-PELVIS WITH   Final Result      1.  Sigmoid diverticulitis without gross evidence of perforation. Underlying neoplasm is not excluded. Follow-up is recommended.   2.  Ventral hernia defect containing transverse colon. No evidence of obstruction   3.  Trace atherosclerosis.   4.  Inferior vena cava filter.   5.  Status post hysterectomy.      EC-ECHOCARDIOGRAM COMPLETE W/ CONT    (Results Pending)       Assessment/Plan  * Rectal bleeding   Assessment & Plan    Gastroenterology consultation  Reversal of Coumadin  Serial CBC  Check TEG/platelet mapping  Fluid resuscitation        Acute diverticulitis   Assessment & Plan    Likely source of bleed based on CT scan  Antibiotics  Cultures  Analgesia  Colonoscopy        Anemia associated with acute blood loss   Assessment & Plan    Serial CBC  Conservative transfusion strategy with goal hemoglobin greater than 7  Control bleeding  ongoing through reversal of Coumadin        VSD (ventricular septal defect)   Assessment & Plan    Chronic, controlled  Monitor fluid status closely to prevent pulmonary edema        Hyperglycemia   Assessment & Plan    Insulin sliding scale while n.p.o.        Essential hypertension   Assessment & Plan    Goal systolic blood pressure less than 140  Holding antihypertensives for now in the setting of gastrointestinal hemorrhage        Obesity due to excess calories   Assessment & Plan    Nutritionist consultation  N.p.o. for now        History of pulmonary embolism   Assessment & Plan    6 years ago, provoked by knee surgery  Controlled  Holding anticoagulants for now, questionable need to continue them this long after a provoked DVT/PE?        Coagulopathy (HCC)   Assessment & Plan    Chronic for pulmonary embolism  Reversal in the setting of acute gastrointestinal hemorrhage  Monitor coags and TEG/platelet mapping  Status post vitamin K x1, may require additional dosages in the next 1-2 days            Discussed patient condition and risk of morbidity and/or mortality with Hospitalist, RN, Pharmacy, Patient and GI.   High risk of deterioration and worsening vital organ dysfunction and death without the above critical care interventions.    The patient remains critically ill.  Critical care time = 35 minutes in directly providing and coordinating critical care and extensive data review.  No time overlap and excludes procedures.

## 2018-10-12 NOTE — ASSESSMENT & PLAN NOTE
Requiring transfusion of two units of RBCs.  Transfused if repeat hemoglobin is less than 7 or continued signs of active bleeding

## 2018-10-12 NOTE — ED PROVIDER NOTES
"ED Provider Note    CHIEF COMPLAINT  Chief Complaint   Patient presents with   • Bloody Stools       HPI  Fior Rebekah Ray is a 68 y.o. female who ambulates to the emergency department with her significant other for rectal bleeding.  Patient states she woke this morning with discomfort in her left lower quadrant, went to have a bowel movement and noticed bright red blood.  Patient states the abdominal pain has completely resolved but she has had 2 more episodes of hematochezia here in the emergency department.  No fever chills.  No nausea or vomiting.  No chest pain, shortness of breath, palpitations or near syncope.  Denies history of similar symptoms or GI bleed.    Patient is on Coumadin for 4 years status post postoperative pulmonary embolisms.      No previous colonoscopy.    REVIEW OF SYSTEMS  See HPI for further details. All other systems are negative.     PAST MEDICAL HISTORY   has a past medical history of Hypertension and VSD (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    SOCIAL HISTORY  Social History     Social History Main Topics   • Smoking status: Never Smoker   • Smokeless tobacco: Never Used   • Alcohol use No   • Drug use: No   • Sexual activity: Not on file       SURGICAL HISTORY  patient denies any surgical history    CURRENT MEDICATIONS  Home Medications    **Home medications have not yet been reviewed for this encounter**         ALLERGIES  Allergies   Allergen Reactions   • Compazine Anaphylaxis       PHYSICAL EXAM  VITAL SIGNS: BP (!) 95/63   Pulse 79   Temp 35.9 °C (96.6 °F)   Resp 18   Ht 1.626 m (5' 4\")   Wt (!) 127 kg (280 lb)   SpO2 97%   BMI 48.06 kg/m²   Pulse ox interpretation: I interpret this pulse ox as normal.  Constitutional: Alert in no apparent distress.  Morbidly obese.  HENT: Normocephalic, atraumatic. Bilateral external ears normal, Nose normal. Moist mucous membranes.    Eyes: Pupils are equal and reactive, Conjunctiva normal.  No pallor.  Neck: " Normal range of motion, Supple.  Lymphatic: No lymphadenopathy noted.   Cardiovascular: Minimal tachycardia otherwise regular rate and rhythm, no murmurs. Distal pulses intact.  No peripheral edema.  Thorax & Lungs: Normal breath sounds.  No wheezing/rales/ronchi. No increased work of breathing  Abdomen: Obese, soft, nondistended.  Nontender to palpation, no reproducible discomfort to palpation.  No palpable pulsatile mass.  Rectal: Normal MARNI.  No visualized hemorrhoid or fissure.  Faint gross red blood.  Guaiac positive.  Skin: Warm, Dry  Musculoskeletal: Good range of motion in all major joints.  Neurologic: Alert and oriented x4.  Speech clear and cohesive.  Moves 4 extremities spontaneously.  Psychiatric: Affect normal, Judgment normal, Mood normal.       DIAGNOSTIC STUDIES / PROCEDURES    LABS  Results for orders placed or performed during the hospital encounter of 10/12/18   CBC WITH DIFFERENTIAL   Result Value Ref Range    WBC 7.7 4.8 - 10.8 K/uL    RBC 4.16 (L) 4.20 - 5.40 M/uL    Hemoglobin 12.3 12.0 - 16.0 g/dL    Hematocrit 39.2 37.0 - 47.0 %    MCV 94.2 81.4 - 97.8 fL    MCH 29.6 27.0 - 33.0 pg    MCHC 31.4 (L) 33.6 - 35.0 g/dL    RDW 48.9 35.9 - 50.0 fL    Platelet Count 270 164 - 446 K/uL    MPV 9.3 9.0 - 12.9 fL    Neutrophils-Polys 61.30 44.00 - 72.00 %    Lymphocytes 26.70 22.00 - 41.00 %    Monocytes 7.80 0.00 - 13.40 %    Eosinophils 3.20 0.00 - 6.90 %    Basophils 0.60 0.00 - 1.80 %    Immature Granulocytes 0.40 0.00 - 0.90 %    Nucleated RBC 0.00 /100 WBC    Neutrophils (Absolute) 4.73 2.00 - 7.15 K/uL    Lymphs (Absolute) 2.06 1.00 - 4.80 K/uL    Monos (Absolute) 0.60 0.00 - 0.85 K/uL    Eos (Absolute) 0.25 0.00 - 0.51 K/uL    Baso (Absolute) 0.05 0.00 - 0.12 K/uL    Immature Granulocytes (abs) 0.03 0.00 - 0.11 K/uL    NRBC (Absolute) 0.00 K/uL   COMP METABOLIC PANEL   Result Value Ref Range    Sodium 141 135 - 145 mmol/L    Potassium 3.7 3.6 - 5.5 mmol/L    Chloride 106 96 - 112 mmol/L    Co2  25 20 - 33 mmol/L    Anion Gap 10.0 0.0 - 11.9    Glucose 114 (H) 65 - 99 mg/dL    Bun 13 8 - 22 mg/dL    Creatinine 0.66 0.50 - 1.40 mg/dL    Calcium 9.2 8.5 - 10.5 mg/dL    AST(SGOT) 15 12 - 45 U/L    ALT(SGPT) 14 2 - 50 U/L    Alkaline Phosphatase 80 30 - 99 U/L    Total Bilirubin 0.4 0.1 - 1.5 mg/dL    Albumin 3.8 3.2 - 4.9 g/dL    Total Protein 7.2 6.0 - 8.2 g/dL    Globulin 3.4 1.9 - 3.5 g/dL    A-G Ratio 1.1 g/dL   PROTHROMBIN TIME (INR)   Result Value Ref Range    PT 31.9 (H) 12.0 - 14.6 sec    INR 3.09 (H) 0.87 - 1.13   APTT   Result Value Ref Range    APTT 45.1 (H) 24.7 - 36.0 sec   COD (ADULT)   Result Value Ref Range    ABO Grouping Only A     Rh Grouping Only POS     Antibody Screen-Cod NEG     Component R       R3                  Red Blood Cells3    [id number]   issued       10/12/18   09:58      Product Type Red Blood Cells LR Pheresis     Dispense Status Issued     Unit Number (Barcoded) [id number]     Product Code (Barcoded) N8929L93     Blood Type (Barcoded) 6200    ABO AND RH CONFIRMATION   Result Value Ref Range    ABO Confirm A     Second Rh Group POS    LACTIC ACID   Result Value Ref Range    Lactic Acid 1.8 0.5 - 2.0 mmol/L   ESTIMATED GFR   Result Value Ref Range    GFR If African American >60 >60 mL/min/1.73 m 2    GFR If Non African American >60 >60 mL/min/1.73 m 2   CBC WITHOUT DIFFERENTIAL   Result Value Ref Range    WBC 8.2 4.8 - 10.8 K/uL    RBC 3.30 (L) 4.20 - 5.40 M/uL    Hemoglobin 10.1 (L) 12.0 - 16.0 g/dL    Hematocrit 31.2 (L) 37.0 - 47.0 %    MCV 94.5 81.4 - 97.8 fL    MCH 30.6 27.0 - 33.0 pg    MCHC 32.4 (L) 33.6 - 35.0 g/dL    RDW 47.9 35.9 - 50.0 fL    Platelet Count 235 164 - 446 K/uL    MPV 9.2 9.0 - 12.9 fL   FRESH FROZEN PLASMA   Result Value Ref Range    Component Ft       TA                  Thawed Plasma       [id number]   issued       10/12/18   10:03      Product Type Thawed Apheresis Plasma     Dispense Status Issued     Unit Number (Barcoded) [id number]  "    Product Code (Barcoded) J4026V43     Blood Type (Barcoded) 6200      RADIOLOGY  CT-ABDOMEN-PELVIS WITH   Final Result      1.  Sigmoid diverticulitis without gross evidence of perforation. Underlying neoplasm is not excluded. Follow-up is recommended.   2.  Ventral hernia defect containing transverse colon. No evidence of obstruction   3.  Trace atherosclerosis.   4.  Inferior vena cava filter.   5.  Status post hysterectomy.          COURSE & MEDICAL DECISION MAKING  Nursing notes and vital signs were reviewed. (See chart for details)  The patients records were reviewed, history was obtained from the patient;     ED evaluation for hematochezia likely secondary to diverticular bleed, CT does show sigmoid diverticulitis, there is no evidence for abscess or perforation.  Abdominal exam is benign.  Labs initially quite unremarkable, no leukocytosis, left shift or bandemia.  Hemoglobin stable at 12.3, hematocrit 39.2, MCV 94.2.  No electrolyte derangement.  Lactate normal as well.  Patient is therapeutic with her Coumadin, INR 3.09.    9:50 AM Patient has had multiple large volume episodes of hematochezia in the emergency department, nursing staff now estimates total 2000 cc.  Patient just now becoming somewhat symptomatic \"dizzy\" and pale.  Remains hemodynamically stable without tachycardia or hypotension.  IV fluid is infusing.  Given ongoing bleeding patient will be transfused 2 units packed red blood cells, 2 units of FFP and will be given vitamin K subcutaneously for Coumadin reversal (risk benefit ratio considered, patient with history of PE postoperatively 4 years ago, unknown why treatment has been continued).  CBC is being repeated.  We will plan hospitalist admission to the ICU, GI consultation.    9:57 AM Dr. Reagan, GI, is aware the patient agreeable consultation.    9:58 AM patient pale and diaphoretic, hemodynamically stable.  IV fluid infusing, but is arrived at bedside.  Nursing staff aware that " rapid infusion of blood may be necessary.  Pharmacy recommends IV vitamin K, rather than the subcutaneous ordered.  Patient is alert and oriented, she and her  have been updated as to the findings and agreeable to the disposition and plan.    10:05 AM Dr. Cobb is aware the patient and agreeable to admission.    10:29 AM repeat hemoglobin 10.1, down 2.2 g/dL since arrival.  Will continue with resuscitation and blood transfusion as planned.    10:54 AM patient reevaluated bedside.  Feeling better, color has returned.  No longer pale and diaphoretic.  Remains hemodynamically stable.  Packed red blood cells and FFP infusion nearly complete.  Vitamin K infusing.  Hospitalist at bedside.    The total critical care time on this patient is 40 minutes, continues hemodynamic monitoring of multiple bedside evaluations, resuscitating patient and assessing response to treatment, speaking with admitting and consulting physician, deciphering test results, and arranging for ICU admission. This 40 minutes is exclusive of separately billable procedures.    FINAL IMPRESSION  (K92.1) Hematochezia  (K57.33) Diverticulitis of large intestine without perforation or abscess with bleeding  (K57.32) Sigmoid diverticulitis  (Z51.81,  Z79.01) Anticoagulated on Coumadin  (D62) Acute blood loss anemia      Electronically signed by: Emili Avelar, 10/12/2018 8:11 AM      This dictation was created using voice recognition software. The accuracy of the dictation is limited to the abilities of the software. I expect there may be some errors of grammar and possibly content. The nursing notes were reviewed and certain aspects of this information were incorporated into this note.

## 2018-10-12 NOTE — ED NOTES
Pt assisted up to BSC, 300 cc of bright red blood with large clots. Additional blood samples collected and sent to lab.

## 2018-10-12 NOTE — PROGRESS NOTES
Patient transferred from ED to S136 via gurney accompanied by RN and CCT.  Patient alert, oriented and pleasant.  Denies pain.  Chlorhexidine bath completed and skin check completed.  Small amount of redness noted on lower back, blanchable.  Mepilex placed to coccyx for prevention.  Patient has 1 bag of personal belongings.

## 2018-10-12 NOTE — CONSULTS
Gastroenterology consult note    Referring physician: Dr Avelar    Reason for consult:  Hematochezia     HPI:    69 yo with PMHx of remote provoked PE 4 years ago on coumadin came in with bright red blood per rectum x 1 episode this morning. She's on coumadin, so she thought it would be wise to come in for any bleeding. She also had LLQ cramping which went away after she had further bloody BMs at ER with passage of clots. She denies any epigastric pain, nausea, vomiting, GI bleed before. She denies taking aspirin or NSAIDs. Her INR has been stable in the past. She did not have any colonoscopy before. She did FIT test regularly and negative so far, last one in Jan, 2018. She denies any bowel habit change (having regular BM once a day) and denies any appetite or weight loss.  She had 2 episodes of DVTs, one distal DVT after vein stripping on left LE which was not treated and one proximal DVT/PE following her right knee surgery 4 years ago. She was told that she will need lifelong anticoagulation for possible hypercoagulable disorder she could not specify further. She denies any family history of colon cancer.  She had 6 more episodes of bright red blood per rectum (total 2.3 L of blood passed) at ER. She got lightheaded after CT abd and Hgb dropped from 12 to 10. She was started on IV Vit K, 2 U of FFP and 2U of pRBC. Her INR was 3.09.   CT abd showed sigmoid diverticulitis. Her VS are stable. Rectal exam by ERP was normal except for gross red blood.    Review of Systems   Constitutional: Negative.              Past Medical History:   Past Medical History:   Diagnosis Date   • Hypertension    • VSD (ventricular septal defect and aortic arch hypoplasia        Past Surgical History:  Past Surgical History:   Procedure Laterality Date   • KNEE ARTHROPLASTY TOTAL Right 2014   • HYSTERECTOMY, VAGINAL     • VEIN STRIPPING         Current Outpatient Medications:  Home Medications    **Home medications have not yet been  "reviewed for this encounter**     on warfarin and HCTZ     Medication Allergy/Sensitivities:  Allergies   Allergen Reactions   • Compazine Anaphylaxis         Family History:  Family History   Problem Relation Age of Onset   • Lung Disease Mother    • Heart Attack Father        Social History:  Social History     Social History   • Marital status:      Spouse name: N/A   • Number of children: N/A   • Years of education: N/A     Occupational History   • Not on file.     Social History Main Topics   • Smoking status: Never Smoker   • Smokeless tobacco: Never Used   • Alcohol use No   • Drug use: No   • Sexual activity: Not on file     Other Topics Concern   • Not on file     Social History Narrative   • No narrative on file         Physical Exam     Vitals:    10/12/18 1000 10/12/18 1015 10/12/18 1037 10/12/18 1044   BP: 129/90 (!) 95/63 121/65    Pulse:  79 72 78   Resp:  18 18    Temp: 36.2 °C (97.2 °F) 35.9 °C (96.6 °F) 36.5 °C (97.7 °F)    SpO2:  97% 92% 97%   Weight:       Height:         Body mass index is 48.06 kg/m².  /65   Pulse 78   Temp 36.5 °C (97.7 °F)   Resp 18   Ht 1.626 m (5' 4\")   Wt (!) 127 kg (280 lb)   SpO2 97%   BMI 48.06 kg/m²   O2 therapy: Pulse Oximetry: 97 %, O2 Delivery: None (Room Air)    Physical Exam   Constitutional: She is oriented to person, place, and time and well-developed, well-nourished, and in no distress.   HENT:   Head: Normocephalic and atraumatic.   Mouth/Throat: Oropharynx is clear and moist.   Eyes: Pupils are equal, round, and reactive to light. No scleral icterus.   Neck: Neck supple. No tracheal deviation present.   Cardiovascular: Normal rate, regular rhythm and normal heart sounds.    Pulmonary/Chest: Breath sounds normal. No respiratory distress. She has no wheezes. She has no rales.   Abdominal: Soft. Bowel sounds are normal. She exhibits no distension and no mass. There is no tenderness. There is no rebound and no guarding.   Musculoskeletal: "   1-2+ pedal edema bilaterally   Neurological: She is alert and oriented to person, place, and time.   Skin: Skin is warm and dry.   Psychiatric: Mood and affect normal.             Data Review       Lab Data Review:  Recent Results (from the past 24 hour(s))   CBC WITH DIFFERENTIAL    Collection Time: 10/12/18  7:52 AM   Result Value Ref Range    WBC 7.7 4.8 - 10.8 K/uL    RBC 4.16 (L) 4.20 - 5.40 M/uL    Hemoglobin 12.3 12.0 - 16.0 g/dL    Hematocrit 39.2 37.0 - 47.0 %    MCV 94.2 81.4 - 97.8 fL    MCH 29.6 27.0 - 33.0 pg    MCHC 31.4 (L) 33.6 - 35.0 g/dL    RDW 48.9 35.9 - 50.0 fL    Platelet Count 270 164 - 446 K/uL    MPV 9.3 9.0 - 12.9 fL    Neutrophils-Polys 61.30 44.00 - 72.00 %    Lymphocytes 26.70 22.00 - 41.00 %    Monocytes 7.80 0.00 - 13.40 %    Eosinophils 3.20 0.00 - 6.90 %    Basophils 0.60 0.00 - 1.80 %    Immature Granulocytes 0.40 0.00 - 0.90 %    Nucleated RBC 0.00 /100 WBC    Neutrophils (Absolute) 4.73 2.00 - 7.15 K/uL    Lymphs (Absolute) 2.06 1.00 - 4.80 K/uL    Monos (Absolute) 0.60 0.00 - 0.85 K/uL    Eos (Absolute) 0.25 0.00 - 0.51 K/uL    Baso (Absolute) 0.05 0.00 - 0.12 K/uL    Immature Granulocytes (abs) 0.03 0.00 - 0.11 K/uL    NRBC (Absolute) 0.00 K/uL   COMP METABOLIC PANEL    Collection Time: 10/12/18  7:52 AM   Result Value Ref Range    Sodium 141 135 - 145 mmol/L    Potassium 3.7 3.6 - 5.5 mmol/L    Chloride 106 96 - 112 mmol/L    Co2 25 20 - 33 mmol/L    Anion Gap 10.0 0.0 - 11.9    Glucose 114 (H) 65 - 99 mg/dL    Bun 13 8 - 22 mg/dL    Creatinine 0.66 0.50 - 1.40 mg/dL    Calcium 9.2 8.5 - 10.5 mg/dL    AST(SGOT) 15 12 - 45 U/L    ALT(SGPT) 14 2 - 50 U/L    Alkaline Phosphatase 80 30 - 99 U/L    Total Bilirubin 0.4 0.1 - 1.5 mg/dL    Albumin 3.8 3.2 - 4.9 g/dL    Total Protein 7.2 6.0 - 8.2 g/dL    Globulin 3.4 1.9 - 3.5 g/dL    A-G Ratio 1.1 g/dL   PROTHROMBIN TIME (INR)    Collection Time: 10/12/18  7:52 AM   Result Value Ref Range    PT 31.9 (H) 12.0 - 14.6 sec    INR 3.09  (H) 0.87 - 1.13   APTT    Collection Time: 10/12/18  7:52 AM   Result Value Ref Range    APTT 45.1 (H) 24.7 - 36.0 sec   COD (ADULT)    Collection Time: 10/12/18  7:52 AM   Result Value Ref Range    ABO Grouping Only A     Rh Grouping Only POS     Antibody Screen-Cod NEG     Component R       R3                  Red Blood Cells3    [id number]   issued       10/12/18   09:58      Product Type Red Blood Cells LR Pheresis     Dispense Status Issued     Unit Number (Barcoded) [id number]     Product Code (Barcoded) V1821A90     Blood Type (Barcoded) 6200     Component R       R3                  Red Blood Cells3    [id number]   issued       10/12/18   10:44      Product Type Red Blood Cells LR Pheresis     Dispense Status Issued     Unit Number (Barcoded) [id number]     Product Code (Barcoded) H4934Q19     Blood Type (Barcoded) 6200    ESTIMATED GFR    Collection Time: 10/12/18  7:52 AM   Result Value Ref Range    GFR If African American >60 >60 mL/min/1.73 m 2    GFR If Non African American >60 >60 mL/min/1.73 m 2   ABO AND RH CONFIRMATION    Collection Time: 10/12/18  8:41 AM   Result Value Ref Range    ABO Confirm A     Second Rh Group POS    LACTIC ACID    Collection Time: 10/12/18  8:41 AM   Result Value Ref Range    Lactic Acid 1.8 0.5 - 2.0 mmol/L   CBC WITHOUT DIFFERENTIAL    Collection Time: 10/12/18  9:52 AM   Result Value Ref Range    WBC 8.2 4.8 - 10.8 K/uL    RBC 3.30 (L) 4.20 - 5.40 M/uL    Hemoglobin 10.1 (L) 12.0 - 16.0 g/dL    Hematocrit 31.2 (L) 37.0 - 47.0 %    MCV 94.5 81.4 - 97.8 fL    MCH 30.6 27.0 - 33.0 pg    MCHC 32.4 (L) 33.6 - 35.0 g/dL    RDW 47.9 35.9 - 50.0 fL    Platelet Count 235 164 - 446 K/uL    MPV 9.2 9.0 - 12.9 fL   FRESH FROZEN PLASMA    Collection Time: 10/12/18  9:52 AM   Result Value Ref Range    Component Ft       TA                  Thawed Plasma       [id number]   issued       10/12/18   10:03      Product Type Thawed Apheresis Plasma     Dispense Status Issued      Unit Number (Barcoded) [id number]     Product Code (Barcoded) U9718D73     Blood Type (Barcoded) 6200     Component Ft       TA                  Thawed Plasma       [id number]   issued       10/12/18   10:32      Product Type Thawed Apheresis Plasma     Dispense Status Issued     Unit Number (Barcoded) [id number]     Product Code (Barcoded) O6519X05     Blood Type (Barcoded) 6200        Imaging/Procedures Review:      CT-ABDOMEN-PELVIS WITH   Final Result      1.  Sigmoid diverticulitis without gross evidence of perforation. Underlying neoplasm is not excluded. Follow-up is recommended.   2.  Ventral hernia defect containing transverse colon. No evidence of obstruction   3.  Trace atherosclerosis.   4.  Inferior vena cava filter.   5.  Status post hysterectomy.      EC-ECHOCARDIOGRAM COMPLETE W/ CONT    (Results Pending)                 Assessment/Plan     # Massive hematochezia  # Acute anemia due to bleeding  # Sigmoid diverticulitis  # History of pulmonary embolism on warfarin  # Presence of IVC filter    Because of diverticulitis, we would like to avoid doing colonoscopy. Hold anticoagulation, keep her NPO and we will consider doing a RBC tag scan. Currently, her vitals and Hgb are stable.  - monitor H & H and transfuse prn  - treatment of diverticulitis per primary team, currently on IV ceftriaxone and flagyl.  - will follow

## 2018-10-12 NOTE — PROGRESS NOTES
Assumed care for pt at this time, pt resting comfortably in bed in no acute distress, pt denies any pain and any questions, will continue to monitor.

## 2018-10-12 NOTE — ASSESSMENT & PLAN NOTE
Secondary to diverticulitis in the setting of Coumadin use  Gastroenterology following with planned colonoscopy in 2-3 weeks  Diet  Daily CBC

## 2018-10-12 NOTE — H&P
"Hospital Medicine History & Physical Note    Date of Service  10/12/2018    Primary Care Physician  Pcp Pt States None    Consultants  GI Dr Reagan    Code Status  Full    Chief Complaint  Rectal bleed and abd pain    History of Presenting Illness  68 y.o. female who presented 10/12/2018 with History of PE on chronic anticoagulation presented to the emergency room with above chief complaint.  She reports that this morning around 6:00 she had lower abdominal cramping shortly after she went to the bathroom and had an episode of rectal bleeding.  No fever no chills.  Since she is on Coumadin she got concerned and presented to the emergency room where she had 6 more episodes of rectal bleeding.  Her abdominal pain has resolved.  She has been maintained on warfarin after having a PE a few years ago after right knee surgery.  She was told that she had \"thick blood\"and has been maintained on warfarin since then.  She moved from [place] in February and has not been able to establish with a primary care provider yet.  In the emergency room with her episodes of bleeding she felt diaphoretic and lightheaded.  She denies any chest pain she denies any loss of consciousness.  No nausea no vomiting.  No melena.  She has never had a colonoscopy.  She reports that she has had stool Hemoccults for colon cancer screening.  She has a history of a VSD diagnosed as a  and did not receive any treatment for.  She also has a history of hypertension.    Review of Systems  Review of Systems   Cardiovascular: Positive for leg swelling.   All other systems reviewed and are negative.      Past Medical History   has a past medical history of Hypertension and VSD (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 She also has no past medical history of Diabetes (HCC) or Renal disorder.   History of PE      Surgical History  Hysterectomy  Total knee arthroplasty    Family History  Reviewed and not pertinent to the presenting " problem    Social History   reports that she has never smoked. She has never used smokeless tobacco. She reports that she does not drink alcohol or use drugs.    Allergies  Allergies   Allergen Reactions   • Compazine Anaphylaxis       Medications  Prior to Admission Medications   Prescriptions Last Dose Informant Patient Reported? Taking?   benzonatate (TESSALON) 200 MG capsule   No No   Sig: Take 1 Cap by mouth 3 times a day as needed for Cough.   hydrochlorothiazide (HYDRODIURIL) 25 MG TABS 4/3/2018  Yes No   Sig: Take 25 mg by mouth every day.   lisinopril (PRINIVIL) 20 MG TABS 4/3/2018  Yes No   Sig: Take 20 mg by mouth every day.   ondansetron (ZOFRAN ODT) 4 MG TBDP   No No   Sig: Take 1 Tab by mouth every 8 hours as needed for Nausea/Vomiting.   oxycodone-acetaminophen (PERCOCET) 5-325 MG TABS   No No   Sig: Take 1-2 Tabs by mouth every four hours as needed.   polymixin-trimethoprim (POLYTRIM) 97543-8.1 UNIT/ML-% Solution   No No   Sig: Place 1 Drop in left eye every 4 hours.   warfarin (COUMADIN) 5 MG Tab 10/11/2018 at 1900  Yes Yes   Sig: Take 5-7.5 mg by mouth every day. 7.5 mg on Sat, Tues and Thurs and 5 mg on all other days.      Facility-Administered Medications: None       Physical Exam  Temp:  [35.9 °C (96.6 °F)-36.2 °C (97.2 °F)] 35.9 °C (96.6 °F)  Pulse:  [] 79  Resp:  [18-20] 18  BP: ()/() 95/63    Physical Exam   Constitutional: She is oriented to person, place, and time. She appears well-developed and well-nourished.   Obese   HENT:   Head: Normocephalic and atraumatic.   Right Ear: External ear normal.   Left Ear: External ear normal.   Mouth/Throat: No oropharyngeal exudate.   Eyes: Conjunctivae are normal. Right eye exhibits no discharge. Left eye exhibits no discharge. No scleral icterus.   Neck: Neck supple. No JVD present. No tracheal deviation present.   Cardiovascular: Normal rate and regular rhythm.  Exam reveals no gallop and no friction rub.    No murmur  heard.  Pulmonary/Chest: Effort normal and breath sounds normal. No stridor. No respiratory distress. She has no wheezes. She has no rales. She exhibits no tenderness.   Abdominal: Soft. Bowel sounds are normal. She exhibits no distension and no mass. There is no tenderness. There is no rebound and no guarding.   Musculoskeletal: She exhibits edema. She exhibits no tenderness.   Neurological: She is alert and oriented to person, place, and time. No cranial nerve deficit. She exhibits normal muscle tone.   Skin: Skin is warm and dry. She is not diaphoretic. No cyanosis. Nails show no clubbing.   Psychiatric: She has a normal mood and affect. Her behavior is normal. Thought content normal.   Nursing note and vitals reviewed.      Laboratory:  Recent Labs      10/12/18   0752  10/12/18   0952   WBC  7.7  8.2   RBC  4.16*  3.30*   HEMOGLOBIN  12.3  10.1*   HEMATOCRIT  39.2  31.2*   MCV  94.2  94.5   MCH  29.6  30.6   MCHC  31.4*  32.4*   RDW  48.9  47.9   PLATELETCT  270  235   MPV  9.3  9.2     Recent Labs      10/12/18   0752   SODIUM  141   POTASSIUM  3.7   CHLORIDE  106   CO2  25   GLUCOSE  114*   BUN  13   CREATININE  0.66   CALCIUM  9.2     Recent Labs      10/12/18   0752   ALTSGPT  14   ASTSGOT  15   ALKPHOSPHAT  80   TBILIRUBIN  0.4   GLUCOSE  114*     Recent Labs      10/12/18   0752   APTT  45.1*   INR  3.09*             No results for input(s): TROPONINI in the last 72 hours.    Urinalysis:    No results found     Imaging:  CT-ABDOMEN-PELVIS WITH   Final Result      1.  Sigmoid diverticulitis without gross evidence of perforation. Underlying neoplasm is not excluded. Follow-up is recommended.   2.  Ventral hernia defect containing transverse colon. No evidence of obstruction   3.  Trace atherosclerosis.   4.  Inferior vena cava filter.   5.  Status post hysterectomy.            Assessment/Plan:  I anticipate this patient will require at least two midnights for appropriate medical management, necessitating  inpatient admission.    * Rectal bleeding   Assessment & Plan    Likely diverticular bleed based on CT findings  Patient will be admitted and closely monitored in the intensive care unit  She will be started on IV fluids  She will be transfused and her coagulopathy reversed  We will trend her hemoglobin and monitor her INR  GI consultation has been obtained she would likely need lower endoscopy when hemodynamically stable and coagulopathy corrected        Acute diverticulitis   Assessment & Plan    Noted on CT  She had been started on ceftriaxone and Flagyl        Anemia associated with acute blood loss   Assessment & Plan    She is receiving FFP's and packed RBC  We will recheck hemoglobin posttransfusion  Transfuse if repeat hemoglobin is less than 7 or continued signs of active bleeding        VSD (ventricular septal defect)   Assessment & Plan    History of  She has not had a recent follow-up care will check echocardiogram        Hyperglycemia   Assessment & Plan    Check hemoglobin A1c        Essential hypertension   Assessment & Plan    We will hold her home blood pressure medications given her active bleeding        Obesity due to excess calories   Assessment & Plan    Body mass index is 48.06 kg/m².         History of pulmonary embolism   Assessment & Plan    Following knee replacement surgery per patient told she had thrombophilia and has been maintained on warfarin    Will need to resume anticoagulation when bleeding resolved and patient is hemodynamically stable        Coagulopathy (HCC)   Assessment & Plan    Secondary to warfarin therapy  She is receiving FFP and vitamin K  Recheck INR and treat accordingly        Pulmonary ICU with who can think    VTE prophylaxis: SCD

## 2018-10-12 NOTE — ASSESSMENT & PLAN NOTE
consistent diverticular bleed based on CT findings  Patient has admitted and closely monitored in the intensive care unit  s/p IV fluids which will be held due to hemodilution.  She has been transfused and her coagulopathy reversed  Hb ordered for the morning.   Refrain from colonoscopy in the setting of diverticulitis.

## 2018-10-12 NOTE — ED TRIAGE NOTES
"  Chief Complaint   Patient presents with   • Bloody Stools     BP (!) 181/106   Pulse (!) 104   Temp 36.1 °C (97 °F)   Resp 18   Ht 1.626 m (5' 4\")   Wt (!) 127 kg (280 lb)   SpO2 92%   BMI 48.06 kg/m²     Pt has been having red bloody stools, starting this morning as well as abdominal cramping.  Denies Nausea or vomiting.     Pt is on warfarin.      Charge notified, roomed into [facility]  "

## 2018-10-13 PROBLEM — E87.6 HYPOKALEMIA: Status: ACTIVE | Noted: 2018-10-13

## 2018-10-13 PROBLEM — E83.51 HYPOCALCEMIA: Status: ACTIVE | Noted: 2018-10-13

## 2018-10-13 LAB
ANION GAP SERPL CALC-SCNC: 6 MMOL/L (ref 0–11.9)
BASOPHILS # BLD AUTO: 0.6 % (ref 0–1.8)
BASOPHILS # BLD: 0.04 K/UL (ref 0–0.12)
BUN SERPL-MCNC: 11 MG/DL (ref 8–22)
CALCIUM SERPL-MCNC: 7.9 MG/DL (ref 8.5–10.5)
CHLORIDE SERPL-SCNC: 111 MMOL/L (ref 96–112)
CO2 SERPL-SCNC: 26 MMOL/L (ref 20–33)
CREAT SERPL-MCNC: 0.5 MG/DL (ref 0.5–1.4)
EOSINOPHIL # BLD AUTO: 0.34 K/UL (ref 0–0.51)
EOSINOPHIL NFR BLD: 4.9 % (ref 0–6.9)
ERYTHROCYTE [DISTWIDTH] IN BLOOD BY AUTOMATED COUNT: 53 FL (ref 35.9–50)
GLUCOSE SERPL-MCNC: 105 MG/DL (ref 65–99)
HCT VFR BLD AUTO: 27.8 % (ref 37–47)
HGB BLD-MCNC: 9 G/DL (ref 12–16)
HGB BLD-MCNC: 9.2 G/DL (ref 12–16)
IMM GRANULOCYTES # BLD AUTO: 0.04 K/UL (ref 0–0.11)
IMM GRANULOCYTES NFR BLD AUTO: 0.6 % (ref 0–0.9)
INR PPP: 1.26 (ref 0.87–1.13)
LYMPHOCYTES # BLD AUTO: 2.04 K/UL (ref 1–4.8)
LYMPHOCYTES NFR BLD: 29.4 % (ref 22–41)
MCH RBC QN AUTO: 30.4 PG (ref 27–33)
MCHC RBC AUTO-ENTMCNC: 32.4 G/DL (ref 33.6–35)
MCV RBC AUTO: 93.9 FL (ref 81.4–97.8)
MONOCYTES # BLD AUTO: 0.59 K/UL (ref 0–0.85)
MONOCYTES NFR BLD AUTO: 8.5 % (ref 0–13.4)
NEUTROPHILS # BLD AUTO: 3.89 K/UL (ref 2–7.15)
NEUTROPHILS NFR BLD: 56 % (ref 44–72)
NRBC # BLD AUTO: 0 K/UL
NRBC BLD-RTO: 0 /100 WBC
PLATELET # BLD AUTO: 182 K/UL (ref 164–446)
PMV BLD AUTO: 9.5 FL (ref 9–12.9)
POTASSIUM SERPL-SCNC: 3.4 MMOL/L (ref 3.6–5.5)
PROTHROMBIN TIME: 15.9 SEC (ref 12–14.6)
RBC # BLD AUTO: 2.96 M/UL (ref 4.2–5.4)
SODIUM SERPL-SCNC: 143 MMOL/L (ref 135–145)
WBC # BLD AUTO: 6.9 K/UL (ref 4.8–10.8)

## 2018-10-13 PROCEDURE — 80048 BASIC METABOLIC PNL TOTAL CA: CPT

## 2018-10-13 PROCEDURE — 700111 HCHG RX REV CODE 636 W/ 250 OVERRIDE (IP): Performed by: INTERNAL MEDICINE

## 2018-10-13 PROCEDURE — 99233 SBSQ HOSP IP/OBS HIGH 50: CPT | Performed by: HOSPITALIST

## 2018-10-13 PROCEDURE — 85025 COMPLETE CBC W/AUTO DIFF WBC: CPT

## 2018-10-13 PROCEDURE — A9270 NON-COVERED ITEM OR SERVICE: HCPCS | Performed by: INTERNAL MEDICINE

## 2018-10-13 PROCEDURE — 99233 SBSQ HOSP IP/OBS HIGH 50: CPT | Performed by: INTERNAL MEDICINE

## 2018-10-13 PROCEDURE — 700111 HCHG RX REV CODE 636 W/ 250 OVERRIDE (IP): Performed by: HOSPITALIST

## 2018-10-13 PROCEDURE — 700105 HCHG RX REV CODE 258: Performed by: INTERNAL MEDICINE

## 2018-10-13 PROCEDURE — 770001 HCHG ROOM/CARE - MED/SURG/GYN PRIV*

## 2018-10-13 PROCEDURE — 700101 HCHG RX REV CODE 250: Performed by: HOSPITALIST

## 2018-10-13 PROCEDURE — 700102 HCHG RX REV CODE 250 W/ 637 OVERRIDE(OP): Performed by: INTERNAL MEDICINE

## 2018-10-13 PROCEDURE — 85018 HEMOGLOBIN: CPT

## 2018-10-13 PROCEDURE — 85610 PROTHROMBIN TIME: CPT

## 2018-10-13 PROCEDURE — 700105 HCHG RX REV CODE 258: Performed by: HOSPITALIST

## 2018-10-13 RX ORDER — POTASSIUM CHLORIDE 20 MEQ/1
20 TABLET, EXTENDED RELEASE ORAL ONCE
Status: COMPLETED | OUTPATIENT
Start: 2018-10-13 | End: 2018-10-13

## 2018-10-13 RX ORDER — POTASSIUM CHLORIDE 20 MEQ/1
40 TABLET, EXTENDED RELEASE ORAL ONCE
Status: COMPLETED | OUTPATIENT
Start: 2018-10-13 | End: 2018-10-13

## 2018-10-13 RX ORDER — CALCIUM CHLORIDE 100 MG/ML
1 INJECTION INTRAVENOUS; INTRAVENTRICULAR ONCE
Status: DISCONTINUED | OUTPATIENT
Start: 2018-10-13 | End: 2018-10-13

## 2018-10-13 RX ADMIN — CEFTRIAXONE SODIUM 2 G: 2 INJECTION, POWDER, FOR SOLUTION INTRAMUSCULAR; INTRAVENOUS at 05:20

## 2018-10-13 RX ADMIN — POTASSIUM CHLORIDE 40 MEQ: 1500 TABLET, EXTENDED RELEASE ORAL at 10:51

## 2018-10-13 RX ADMIN — METRONIDAZOLE 500 MG: 500 INJECTION, SOLUTION INTRAVENOUS at 06:04

## 2018-10-13 RX ADMIN — SODIUM CHLORIDE: 9 INJECTION, SOLUTION INTRAVENOUS at 10:54

## 2018-10-13 RX ADMIN — POTASSIUM CHLORIDE 20 MEQ: 1500 TABLET, EXTENDED RELEASE ORAL at 13:00

## 2018-10-13 RX ADMIN — METRONIDAZOLE 500 MG: 500 INJECTION, SOLUTION INTRAVENOUS at 13:16

## 2018-10-13 RX ADMIN — METRONIDAZOLE 500 MG: 500 INJECTION, SOLUTION INTRAVENOUS at 22:01

## 2018-10-13 RX ADMIN — CALCIUM CHLORIDE 1 G: 100 INJECTION, SOLUTION INTRAVENOUS at 14:30

## 2018-10-13 ASSESSMENT — ENCOUNTER SYMPTOMS
HEARTBURN: 0
CONFUSION: 0
VOMITING: 0
DIZZINESS: 0
CHOKING: 0
BACK PAIN: 0
BRUISES/BLEEDS EASILY: 0
CONSTIPATION: 0
PALPITATIONS: 0
CHILLS: 0
FEVER: 0
NAUSEA: 0
COUGH: 0
APPETITE CHANGE: 0
SHORTNESS OF BREATH: 0
ABDOMINAL PAIN: 0
BLOOD IN STOOL: 1
DIARRHEA: 0
TROUBLE SWALLOWING: 0
HEADACHES: 0
RECTAL PAIN: 0
PHOTOPHOBIA: 0

## 2018-10-13 ASSESSMENT — PAIN SCALES - GENERAL
PAINLEVEL_OUTOF10: 0

## 2018-10-13 ASSESSMENT — PATIENT HEALTH QUESTIONNAIRE - PHQ9
SUM OF ALL RESPONSES TO PHQ9 QUESTIONS 1 AND 2: 0
2. FEELING DOWN, DEPRESSED, IRRITABLE, OR HOPELESS: NOT AT ALL
2. FEELING DOWN, DEPRESSED, IRRITABLE, OR HOPELESS: NOT AT ALL
1. LITTLE INTEREST OR PLEASURE IN DOING THINGS: NOT AT ALL
1. LITTLE INTEREST OR PLEASURE IN DOING THINGS: NOT AT ALL
SUM OF ALL RESPONSES TO PHQ9 QUESTIONS 1 AND 2: 0

## 2018-10-13 NOTE — PROGRESS NOTES
Critical Care Progress Note    Date of admission  10/12/2018    Chief Complaint  68 y.o. female admitted 10/12/2018 with bloody BMs    Hospital Course    68 y.o. female who presented 10/12/2018 with a past medical history significant for pulmonary embolism on Coumadin as well as a VSD with intermittent congestive heart failure who presents to the emergency department today after a large bloody bowel movement with associated left lower quadrant abdominal pain that started this morning.  Her abdominal was described as pure bright red blood and she had associated lightheadedness, diaphoresis, dizziness.  In the emergency department she was found to have 2 other episodes of moderate amount of bloody stool and had a drop in her hemoglobin from 12-10.  She was given vitamin K as well as 2 units of FFP and is receiving her second unit of packed red cells at the time of my evaluation.  She states the left lower quadrant abdominal pain has not resolved after the bowel movements and she denies any prior gastrointestinal hemorrhages nor knowledge of diverticulosis.  No family history that she is aware of of colon cancer and she has never had a colonoscopy herself.  She denies any hematemesis no nausea.        Interval Problem Update  Reviewed last 24 hour events:   - Hgb from 12-->9 with 2 units pRBC and 2 units FFP given, 2 additional bloody stools since admission   - low K 3.4   - low Ca --> replaced   - INR 1.26 after reversal   - AAOx4   - good UOP   - NS @ 100   - day 2 abx for diverticulitis    Review of Systems  Review of Systems   Constitutional: Negative for appetite change, chills and fever.   HENT: Negative for dental problem and trouble swallowing.    Eyes: Negative for photophobia.   Respiratory: Negative for cough, choking and shortness of breath.    Cardiovascular: Positive for leg swelling. Negative for chest pain and palpitations.   Gastrointestinal: Positive for blood in stool. Negative for abdominal pain,  nausea, rectal pain and vomiting.   Endocrine: Negative for cold intolerance.   Genitourinary: Negative for vaginal bleeding.   Musculoskeletal: Negative for back pain.   Skin: Negative for rash.   Neurological: Negative for dizziness and headaches.   Hematological: Does not bruise/bleed easily.   Psychiatric/Behavioral: Negative for confusion.   All other systems reviewed and are negative.       Vital Signs for last 24 hours   Temp:  [35.9 °C (96.6 °F)-36.9 °C (98.4 °F)] 36.6 °C (97.8 °F)  Pulse:  [59-93] 60  Resp:  [12-42] 12  BP: ()/(63-90) 121/65    Hemodynamic parameters for last 24 hours       Vent Settings for last 24 hours   2 lpm n/c    Physical Exam   Physical Exam   Constitutional: She is oriented to person, place, and time. She appears well-developed and well-nourished. No distress.   Obese, pleasant female   HENT:   Head: Normocephalic and atraumatic.   Mouth/Throat: Oropharynx is clear and moist.   Abnormal dentition   Eyes: Pupils are equal, round, and reactive to light. Conjunctivae are normal. No scleral icterus.   Neck: Neck supple. No JVD present. No tracheal deviation present.   Cardiovascular: Normal rate, regular rhythm, normal heart sounds and intact distal pulses.    No murmur heard.  Pulmonary/Chest: Effort normal and breath sounds normal. No respiratory distress. She has no wheezes.   Abdominal: Soft. Bowel sounds are normal. She exhibits no distension. There is no tenderness. There is no rebound.   Musculoskeletal: She exhibits edema. She exhibits no tenderness.   Neurological: She is alert and oriented to person, place, and time. No cranial nerve deficit.   Skin: Skin is warm and dry. No pallor.   Psychiatric: She has a normal mood and affect. Her behavior is normal. Judgment and thought content normal.   Nursing note and vitals reviewed.      Medications  Current Facility-Administered Medications   Medication Dose Route Frequency Provider Last Rate Last Dose   • benzonatate  (TESSALON) capsule 200 mg  200 mg Oral TID PRN Petar Maguire M.D.       • Respiratory Care per Protocol   Nebulization Continuous RT Petar Maguire M.D.       • NS infusion   Intravenous Continuous Petar Maguire M.D. 100 mL/hr at 10/12/18 2206     • ondansetron (ZOFRAN) syringe/vial injection 4 mg  4 mg Intravenous Q4HRS PRN Petar Maguire M.D.       • ondansetron (ZOFRAN ODT) dispertab 4 mg  4 mg Oral Q4HRS PRN Petar Maguire M.D.       • acetaminophen (TYLENOL) tablet 650 mg  650 mg Oral Q6HRS PRN Petar Maguire M.D.       • oxyCODONE-acetaminophen (PERCOCET) 5-325 MG per tablet 1 Tab  1 Tab Oral Q6HRS PRN Petar Maguire M.D.       • cefTRIAXone (ROCEPHIN) 2 g in  mL IVPB  2 g Intravenous Q24HRS Petar Maguire M.D.   Stopped at 10/13/18 0550   • metroNIDAZOLE (FLAGYL) IVPB 500 mg  500 mg Intravenous Q8HRS Petar Maguire M.D.   Stopped at 10/13/18 0704       Fluids    Intake/Output Summary (Last 24 hours) at 10/13/18 0740  Last data filed at 10/13/18 0600   Gross per 24 hour   Intake             4650 ml   Output             3900 ml   Net              750 ml       Laboratory  Recent Results (from the past 48 hour(s))   CBC WITH DIFFERENTIAL    Collection Time: 10/12/18  7:52 AM   Result Value Ref Range    WBC 7.7 4.8 - 10.8 K/uL    RBC 4.16 (L) 4.20 - 5.40 M/uL    Hemoglobin 12.3 12.0 - 16.0 g/dL    Hematocrit 39.2 37.0 - 47.0 %    MCV 94.2 81.4 - 97.8 fL    MCH 29.6 27.0 - 33.0 pg    MCHC 31.4 (L) 33.6 - 35.0 g/dL    RDW 48.9 35.9 - 50.0 fL    Platelet Count 270 164 - 446 K/uL    MPV 9.3 9.0 - 12.9 fL    Neutrophils-Polys 61.30 44.00 - 72.00 %    Lymphocytes 26.70 22.00 - 41.00 %    Monocytes 7.80 0.00 - 13.40 %    Eosinophils 3.20 0.00 - 6.90 %    Basophils 0.60 0.00 - 1.80 %    Immature Granulocytes 0.40 0.00 - 0.90 %    Nucleated RBC 0.00 /100 WBC    Neutrophils (Absolute) 4.73 2.00 - 7.15 K/uL    Lymphs (Absolute) 2.06 1.00 - 4.80 K/uL    Monos  (Absolute) 0.60 0.00 - 0.85 K/uL    Eos (Absolute) 0.25 0.00 - 0.51 K/uL    Baso (Absolute) 0.05 0.00 - 0.12 K/uL    Immature Granulocytes (abs) 0.03 0.00 - 0.11 K/uL    NRBC (Absolute) 0.00 K/uL   COMP METABOLIC PANEL    Collection Time: 10/12/18  7:52 AM   Result Value Ref Range    Sodium 141 135 - 145 mmol/L    Potassium 3.7 3.6 - 5.5 mmol/L    Chloride 106 96 - 112 mmol/L    Co2 25 20 - 33 mmol/L    Anion Gap 10.0 0.0 - 11.9    Glucose 114 (H) 65 - 99 mg/dL    Bun 13 8 - 22 mg/dL    Creatinine 0.66 0.50 - 1.40 mg/dL    Calcium 9.2 8.5 - 10.5 mg/dL    AST(SGOT) 15 12 - 45 U/L    ALT(SGPT) 14 2 - 50 U/L    Alkaline Phosphatase 80 30 - 99 U/L    Total Bilirubin 0.4 0.1 - 1.5 mg/dL    Albumin 3.8 3.2 - 4.9 g/dL    Total Protein 7.2 6.0 - 8.2 g/dL    Globulin 3.4 1.9 - 3.5 g/dL    A-G Ratio 1.1 g/dL   PROTHROMBIN TIME (INR)    Collection Time: 10/12/18  7:52 AM   Result Value Ref Range    PT 31.9 (H) 12.0 - 14.6 sec    INR 3.09 (H) 0.87 - 1.13   APTT    Collection Time: 10/12/18  7:52 AM   Result Value Ref Range    APTT 45.1 (H) 24.7 - 36.0 sec   COD (ADULT)    Collection Time: 10/12/18  7:52 AM   Result Value Ref Range    ABO Grouping Only A     Rh Grouping Only POS     Antibody Screen-Cod NEG     Component R       R3                  Red Blood Cells3    [id number]   transfused   10/12/18   09:58      Product Type Red Blood Cells LR Pheresis     Dispense Status Transfused     Unit Number (Barcoded) [id number]     Product Code (Barcoded) X0852Y88     Blood Type (Barcoded) 6200     Component R       R3                  Red Blood Cells3    [id number]   transfused   10/12/18   10:44      Product Type Red Blood Cells LR Pheresis     Dispense Status Transfused     Unit Number (Barcoded) [id number]     Product Code (Barcoded) A7165G27     Blood Type (Barcoded) 6200    ESTIMATED GFR    Collection Time: 10/12/18  7:52 AM   Result Value Ref Range    GFR If African American >60 >60 mL/min/1.73 m 2    GFR If Non  African American >60 >60 mL/min/1.73 m 2   ABO AND RH CONFIRMATION    Collection Time: 10/12/18  8:41 AM   Result Value Ref Range    ABO Confirm A     Second Rh Group POS    LACTIC ACID    Collection Time: 10/12/18  8:41 AM   Result Value Ref Range    Lactic Acid 1.8 0.5 - 2.0 mmol/L   CBC WITHOUT DIFFERENTIAL    Collection Time: 10/12/18  9:52 AM   Result Value Ref Range    WBC 8.2 4.8 - 10.8 K/uL    RBC 3.30 (L) 4.20 - 5.40 M/uL    Hemoglobin 10.1 (L) 12.0 - 16.0 g/dL    Hematocrit 31.2 (L) 37.0 - 47.0 %    MCV 94.5 81.4 - 97.8 fL    MCH 30.6 27.0 - 33.0 pg    MCHC 32.4 (L) 33.6 - 35.0 g/dL    RDW 47.9 35.9 - 50.0 fL    Platelet Count 235 164 - 446 K/uL    MPV 9.2 9.0 - 12.9 fL   FRESH FROZEN PLASMA    Collection Time: 10/12/18  9:52 AM   Result Value Ref Range    Component Ft       TA                  Thawed Plasma       [id number]   transfused   10/12/18   10:03      Product Type Thawed Apheresis Plasma     Dispense Status Transfused     Unit Number (Barcoded) [id number]     Product Code (Barcoded) X5741N07     Blood Type (Barcoded) 6200     Component Ft       TA                  Thawed Plasma       [id number]   transfused   10/12/18   10:32      Product Type Thawed Apheresis Plasma     Dispense Status Transfused     Unit Number (Barcoded) [id number]     Product Code (Barcoded) E6985S87     Blood Type (Barcoded) 6200    PLATELET MAPPING WITH BASIC TEG    Collection Time: 10/12/18 12:30 PM   Result Value Ref Range    Reaction Time Initial-R 4.8 (L) 5.0 - 10.0 min    Clot Kinetics-K 1.3 1.0 - 3.0 min    Clot Angle-Angle 71.2 53.0 - 72.0 degrees    Maximum Clot Strength-MA 70.7 (H) 50.0 - 70.0 mm    Lysis 30 minutes-LY30 0.0 0.0 - 8.0 %    % Inhibition ADP 4.3 %    % Inhibition AA 0.0 %    TEG Algorithm Link Algorithm    HGB    Collection Time: 10/12/18 12:30 PM   Result Value Ref Range    Hemoglobin 11.0 (L) 12.0 - 16.0 g/dL   PROTHROMBIN TIME    Collection Time: 10/12/18 12:30 PM   Result Value Ref  Range    PT 21.2 (H) 12.0 - 14.6 sec    INR 1.82 (H) 0.87 - 1.13   HEMOGLOBIN A1C    Collection Time: 10/12/18 12:30 PM   Result Value Ref Range    Glycohemoglobin 6.1 (H) 0.0 - 5.6 %    Est Avg Glucose 128 mg/dL   HGB    Collection Time: 10/12/18  8:32 PM   Result Value Ref Range    Hemoglobin 9.7 (L) 12.0 - 16.0 g/dL   CBC with Differential    Collection Time: 10/13/18  4:55 AM   Result Value Ref Range    WBC 6.9 4.8 - 10.8 K/uL    RBC 2.96 (L) 4.20 - 5.40 M/uL    Hemoglobin 9.0 (L) 12.0 - 16.0 g/dL    Hematocrit 27.8 (L) 37.0 - 47.0 %    MCV 93.9 81.4 - 97.8 fL    MCH 30.4 27.0 - 33.0 pg    MCHC 32.4 (L) 33.6 - 35.0 g/dL    RDW 53.0 (H) 35.9 - 50.0 fL    Platelet Count 182 164 - 446 K/uL    MPV 9.5 9.0 - 12.9 fL    Neutrophils-Polys 56.00 44.00 - 72.00 %    Lymphocytes 29.40 22.00 - 41.00 %    Monocytes 8.50 0.00 - 13.40 %    Eosinophils 4.90 0.00 - 6.90 %    Basophils 0.60 0.00 - 1.80 %    Immature Granulocytes 0.60 0.00 - 0.90 %    Nucleated RBC 0.00 /100 WBC    Neutrophils (Absolute) 3.89 2.00 - 7.15 K/uL    Lymphs (Absolute) 2.04 1.00 - 4.80 K/uL    Monos (Absolute) 0.59 0.00 - 0.85 K/uL    Eos (Absolute) 0.34 0.00 - 0.51 K/uL    Baso (Absolute) 0.04 0.00 - 0.12 K/uL    Immature Granulocytes (abs) 0.04 0.00 - 0.11 K/uL    NRBC (Absolute) 0.00 K/uL   Basic Metabolic Panel (BMP)    Collection Time: 10/13/18  4:55 AM   Result Value Ref Range    Sodium 143 135 - 145 mmol/L    Potassium 3.4 (L) 3.6 - 5.5 mmol/L    Chloride 111 96 - 112 mmol/L    Co2 26 20 - 33 mmol/L    Glucose 105 (H) 65 - 99 mg/dL    Bun 11 8 - 22 mg/dL    Creatinine 0.50 0.50 - 1.40 mg/dL    Calcium 7.9 (L) 8.5 - 10.5 mg/dL    Anion Gap 6.0 0.0 - 11.9   ESTIMATED GFR    Collection Time: 10/13/18  4:55 AM   Result Value Ref Range    GFR If African American >60 >60 mL/min/1.73 m 2    GFR If Non African American >60 >60 mL/min/1.73 m 2   PROTHROMBIN TIME    Collection Time: 10/13/18  5:11 AM   Result Value Ref Range    PT 15.9 (H) 12.0 - 14.6 sec     INR 1.26 (H) 0.87 - 1.13       Imaging  X-Ray:  No film today    Assessment/Plan  * Rectal bleeding   Assessment & Plan    With likely source being diverticulitis in the setting of chronic anticoagulation status post reversal  Gastroenterology consulted, pending determination of need for endoscopy  N.p.o.  Serial CBC        Acute diverticulitis   Assessment & Plan    Continue antibiotics, analgesics as needed        Anemia associated with acute blood loss   Assessment & Plan    Slightly worse today  Conservative transfusion strategy with goal hemoglobin greater than 7  Serial CBC        Essential hypertension   Assessment & Plan    Goal systolic blood pressure less than 140  Continue to hold antihypertensives at this time but will eventually need to resume lisinopril and HydroDIURIL        History of pulmonary embolism   Assessment & Plan    Provoked after knee surgery 6 years ago  Holding anticoagulants for now        Coagulopathy (HCC)   Assessment & Plan    Chronic for pulmonary embolism  Status post vitamin K and FFP  Monitor INR off of Coumadin  Ongoing discussions whether needs to resume Coumadin after GI bleed stabilized or not (6 years of anticoagulation after a provoked clot)        Hypocalcemia   Assessment & Plan    In the setting of blood transfusions  Repletion and monitor daily        Hypokalemia   Assessment & Plan    Repletion and monitor daily        VSD (ventricular septal defect)   Assessment & Plan    Chronic, controlled  Monitor fluid status closely to prevent pulmonary edema        Hyperglycemia   Assessment & Plan    Insulin sliding scale while n.p.o.        Obesity due to excess calories   Assessment & Plan    RD following  N.p.o. currently  Encourage weight loss             VTE:  Contraindicated  Ulcer: Not Indicated  Lines: None    I have performed a physical exam and reviewed and updated ROS and Plan today (10/13/2018). In review of yesterday's note (10/12/2018), there are no changes  except as documented above.     Discussed patient condition and risk of morbidity and/or mortality with Hospitalist, RN, RT, Pharmacy, Charge nurse / hot rounds, Patient and GI

## 2018-10-13 NOTE — PROGRESS NOTES
Gastroenterology Progress Note     Author: Hector JEFF Tez   Date & Time Created: 10/13/2018 2:19 PM    Chief Complaint:  hematochezia    Interval History:  Pt reports only passage of small clots. No elis blood. She feels well today    Review of Systems:  Review of Systems   Constitutional: Negative for chills and fever.   Respiratory: Negative for shortness of breath.    Cardiovascular: Negative for chest pain and palpitations.   Gastrointestinal: Negative for abdominal pain, constipation, diarrhea, heartburn, nausea and vomiting.       Physical Exam:  Physical Exam   Constitutional: No distress.   Cardiovascular: Normal rate and regular rhythm.    Pulmonary/Chest: Effort normal and breath sounds normal.   Abdominal: Soft. Bowel sounds are normal. She exhibits no distension. There is no tenderness.       Labs:        Invalid input(s): [id number]      Recent Labs      10/12/18   0752  10/13/18   0455   SODIUM  141  143   POTASSIUM  3.7  3.4*   CHLORIDE  106  111   CO2  25  26   BUN  13  11   CREATININE  0.66  0.50   CALCIUM  9.2  7.9*     Recent Labs      10/12/18   0752  10/13/18   0455   ALTSGPT  14   --    ASTSGOT  15   --    ALKPHOSPHAT  80   --    TBILIRUBIN  0.4   --    GLUCOSE  114*  105*     Recent Labs      10/12/18   0752  10/12/18   0952  10/12/18   1230  10/12/18   2032  10/13/18   0455  10/13/18   0511  10/13/18   1310   RBC  4.16*  3.30*   --    --   2.96*   --    --    HEMOGLOBIN  12.3  10.1*  11.0*  9.7*  9.0*   --   9.2*   HEMATOCRIT  39.2  31.2*   --    --   27.8*   --    --    PLATELETCT  270  235   --    --   182   --    --    PROTHROMBTM  31.9*   --   21.2*   --    --   15.9*   --    APTT  45.1*   --    --    --    --    --    --    INR  3.09*   --   1.82*   --    --   1.26*   --      Recent Labs      10/12/18   0752  10/12/18   0952  10/13/18   0455   WBC  7.7  8.2  6.9   NEUTSPOLYS  61.30   --   56.00   LYMPHOCYTES  26.70   --   29.40   MONOCYTES  7.80   --   8.50   EOSINOPHILS  3.20    --   4.90   BASOPHILS  0.60   --   0.60   ASTSGOT  15   --    --    ALTSGPT  14   --    --    ALKPHOSPHAT  80   --    --    TBILIRUBIN  0.4   --    --      Hemodynamics:  Temp (24hrs), Av.8 °C (98.2 °F), Min:36.5 °C (97.7 °F), Max:37.1 °C (98.8 °F)  Temperature: 37 °C (98.6 °F)  Pulse  Av.9  Min: 59  Max: 104Heart Rate (Monitored): 75  NIBP: 157/71     Respiratory:    Respiration: (!) 21, Pulse Oximetry: 97 %        RUL Breath Sounds: Clear;Diminished, RML Breath Sounds: Diminished, RLL Breath Sounds: Diminished, NATALEE Breath Sounds: Clear, LLL Breath Sounds: Diminished  Fluids:    Intake/Output Summary (Last 24 hours) at 10/13/18 1419  Last data filed at 10/13/18 1400   Gross per 24 hour   Intake             3500 ml   Output             2400 ml   Net             1100 ml     Weight: (!) 131.8 kg (290 lb 9.1 oz)  GI/Nutrition:  Orders Placed This Encounter   Procedures   • Diet Order Clear Liquids - No Red Foods     Standing Status:   Standing     Number of Occurrences:   1     Order Specific Question:   Diet:     Answer:   Clear Liquids - No Red Foods [12]     Medical Decision Making, by Problem:  Active Hospital Problems    Diagnosis   • *Rectal bleeding [K62.5]   • Anemia associated with acute blood loss [D62]   • Acute diverticulitis [K57.92]   • History of pulmonary embolism [Z86.711]   • Essential hypertension [I10]   • Hypokalemia [E87.6]   • Hypocalcemia [E83.51]   • Obesity due to excess calories [E66.09]   • Hyperglycemia [R73.9]   • VSD (ventricular septal defect) [Q21.0]   • Coagulopathy (HCC) [D68.9]       Plan:  Diverticulitis with acute diverticular bleed while on anticoagulation - Bleeding has stopped. Would treat diverticulitis with antibiotics. Monitor stools for blood. If patient rebleeds, would obtain bleeding scan or CT angio to localize bleed. Pt is stable now. Would hold anticoagulation until tomorrow and then restart. Ok to advance diet. Please call DHA if new questions. DHA will sign  off.    Quality-Core Measures

## 2018-10-13 NOTE — PROGRESS NOTES
"[facility] Hospitalist Progress Note    Date of Service: 10/13/2018    Chief Complaint  68 y.o. female admitted 10/12/2018 with abdominal pain and rectal bleeding.    Interval Problem Update  Ms. Ray has a history of pulmonary embolism on chronic coumadin therapy that presented to the ER with lower abdominal cramping and at least 6 episodes of rectal bleeding. She had a CT of the abdomen revealing diverticulitis. 2 units of RBCs and 2 units of FFP have been transfused. She has been admitted to the ICU with GI consultation.     RN notes she had 2 \"clots\" for BMs and dark, formed BM this morning. She is tolerating a clear liquid diet. I discussed with Dr. Reagan today and he will do an outpatient colonoscopy.   She denies abdominal pain.   Consultants/Specialty  Critical Care. I discussed her condition with Dr. Gonda on ICU Hot Rounds  GI    Disposition  medical        Review of Systems   Constitutional: Negative for chills and fever.   Respiratory: Negative for cough.    Cardiovascular: Negative for chest pain.   Gastrointestinal: Positive for blood in stool. Negative for abdominal pain, nausea and vomiting.   All other systems reviewed and are negative.     Physical Exam  Laboratory/Imaging   Hemodynamics  Temp (24hrs), Av.6 °C (97.8 °F), Min:35.9 °C (96.6 °F), Max:36.9 °C (98.4 °F)   Temperature: 36.6 °C (97.8 °F)  Pulse  Av.3  Min: 59  Max: 104 Heart Rate (Monitored): (!) 56  Blood Pressure : 121/65, NIBP: 113/59      Respiratory      Respiration: 12, Pulse Oximetry: 100 %        RUL Breath Sounds: Clear;Diminished, RML Breath Sounds: Diminished, RLL Breath Sounds: Diminished, NATALEE Breath Sounds: Clear, LLL Breath Sounds: Diminished    Fluids    Intake/Output Summary (Last 24 hours) at 10/13/18 0727  Last data filed at 10/13/18 0600   Gross per 24 hour   Intake             4650 ml   Output             3900 ml   Net              750 ml       Nutrition  Orders Placed This Encounter   Procedures   • Diet " Order Clear Liquids - No Red Foods     Standing Status:   Standing     Number of Occurrences:   1     Order Specific Question:   Diet:     Answer:   Clear Liquids - No Red Foods [12]     Physical Exam   Constitutional: She is oriented to person, place, and time. No distress.   HENT:   Head: Normocephalic and atraumatic.   Neck: Neck supple.   Neck pannus   Cardiovascular: Normal rate and regular rhythm.    No murmur heard.  Pulmonary/Chest: Effort normal and breath sounds normal.   Abdominal: Soft. She exhibits no distension. There is no tenderness.   Musculoskeletal:   2+ edema bilaterally   Neurological: She is alert and oriented to person, place, and time.   Skin: Skin is warm and dry. She is not diaphoretic. There is pallor.   Nursing note and vitals reviewed.      Recent Labs      10/12/18   0752  10/12/18   0952  10/12/18   1230  10/12/18   2032  10/13/18   0455   WBC  7.7  8.2   --    --   6.9   RBC  4.16*  3.30*   --    --   2.96*   HEMOGLOBIN  12.3  10.1*  11.0*  9.7*  9.0*   HEMATOCRIT  39.2  31.2*   --    --   27.8*   MCV  94.2  94.5   --    --   93.9   MCH  29.6  30.6   --    --   30.4   MCHC  31.4*  32.4*   --    --   32.4*   RDW  48.9  47.9   --    --   53.0*   PLATELETCT  270  235   --    --   182   MPV  9.3  9.2   --    --   9.5     Recent Labs      10/12/18   0752  10/13/18   0455   SODIUM  141  143   POTASSIUM  3.7  3.4*   CHLORIDE  106  111   CO2  25  26   GLUCOSE  114*  105*   BUN  13  11   CREATININE  0.66  0.50   CALCIUM  9.2  7.9*     Recent Labs      10/12/18   0752  10/12/18   1230  10/13/18   0511   APTT  45.1*   --    --    INR  3.09*  1.82*  1.26*                  Assessment/Plan     * Rectal bleeding   Assessment & Plan    consistent diverticular bleed based on CT findings  Patient has admitted and closely monitored in the intensive care unit  s/p IV fluids which will be held due to hemodilution.  She has been transfused and her coagulopathy reversed  Hb ordered for the morning.    Refrain from colonoscopy in the setting of diverticulitis.         Acute diverticulitis   Assessment & Plan    Noted on CT  Ceftriaxone and Flagyl        Anemia associated with acute blood loss   Assessment & Plan    Requiring transfusion of two units of RBCs.  Transfused if repeat hemoglobin is less than 7 or continued signs of active bleeding        Essential hypertension   Assessment & Plan    We will hold her home blood pressure medications given her active bleeding        History of pulmonary embolism   Assessment & Plan    Following knee replacement surgery 5 years ago.  She had a DVT 25 years ago.  He has had an IVC filter placed in the past.         Coagulopathy (HCC)   Assessment & Plan    Secondary to warfarin therapy  She was given 2 units of FFP and vitamin K          VSD (ventricular septal defect)   Assessment & Plan    History of  She has not had a recent follow-up care will check echocardiogram        Hyperglycemia   Assessment & Plan    Check hemoglobin A1c        Obesity due to excess calories   Assessment & Plan    Body mass index is 48.06 kg/m².           Quality-Core Measures   Reviewed items::  Labs reviewed and Medications reviewed  Chavez catheter::  No Chavez  DVT prophylaxis pharmacological::  Contraindicated - High bleeding risk  DVT prophylaxis - mechanical:  SCDs

## 2018-10-13 NOTE — DIETARY
NUTRITION SERVICES: Pt with morbid obesity as evidenced by BMI >40 (49.88). Weight loss counseling not appropriate in acute care setting. Recommend referral to outpatient nutrition services for weight management after D/C, if pt desires.

## 2018-10-13 NOTE — CARE PLAN
Problem: Venous Thromboembolism (VTW)/Deep Vein Thrombosis (DVT) Prevention:  Goal: Patient will participate in Venous Thrombosis (VTE)/Deep Vein Thrombosis (DVT)Prevention Measures  Outcome: PROGRESSING AS EXPECTED   10/12/18 2000   Mechanical/VTE Prophylaxis   Mechanical Prophylaxis  SCDs, Sequential Compression Device   SCDs, Sequential Compression Device On   OTHER   Risk Assessment Score 1   VTE RISK Moderate     Intervention: Ensure patient wears graduated elastic stockings (EDGARDO hose) and/or SCDs, if ordered, when in bed or chair (Remove at least once per shift for skin check)   10/12/18 2000   Mechanical/VTE Prophylaxis   Mechanical Prophylaxis  SCDs, Sequential Compression Device   SCDs, Sequential Compression Device On         Problem: Bowel/Gastric:  Goal: Normal bowel function is maintained or improved  Outcome: PROGRESSING AS EXPECTED   10/12/18 2000   OTHER   Last BM 10/12/18     Goal: Will not experience complications related to bowel motility  Outcome: PROGRESSING AS EXPECTED

## 2018-10-14 ENCOUNTER — HOSPITAL ENCOUNTER (INPATIENT)
Dept: CARDIOLOGY | Facility: MEDICAL CENTER | Age: 68
End: 2018-10-14
Attending: HOSPITALIST
Payer: MEDICARE

## 2018-10-14 VITALS
WEIGHT: 288.58 LBS | DIASTOLIC BLOOD PRESSURE: 65 MMHG | TEMPERATURE: 98.2 F | OXYGEN SATURATION: 96 % | HEIGHT: 64 IN | RESPIRATION RATE: 56 BRPM | HEART RATE: 84 BPM | BODY MASS INDEX: 49.27 KG/M2 | SYSTOLIC BLOOD PRESSURE: 121 MMHG

## 2018-10-14 DIAGNOSIS — I50.9 CONGESTIVE HEART FAILURE, UNSPECIFIED HF CHRONICITY, UNSPECIFIED HEART FAILURE TYPE (HCC): ICD-10-CM

## 2018-10-14 PROBLEM — R09.02 HYPOXIA: Status: ACTIVE | Noted: 2018-10-14

## 2018-10-14 PROBLEM — G47.33 OSA (OBSTRUCTIVE SLEEP APNEA): Status: ACTIVE | Noted: 2018-10-14

## 2018-10-14 LAB
ANION GAP SERPL CALC-SCNC: 6 MMOL/L (ref 0–11.9)
BUN SERPL-MCNC: 9 MG/DL (ref 8–22)
CALCIUM SERPL-MCNC: 8.3 MG/DL (ref 8.5–10.5)
CHLORIDE SERPL-SCNC: 108 MMOL/L (ref 96–112)
CO2 SERPL-SCNC: 28 MMOL/L (ref 20–33)
CREAT SERPL-MCNC: 0.57 MG/DL (ref 0.5–1.4)
ERYTHROCYTE [DISTWIDTH] IN BLOOD BY AUTOMATED COUNT: 51.1 FL (ref 35.9–50)
GLUCOSE SERPL-MCNC: 105 MG/DL (ref 65–99)
HCT VFR BLD AUTO: 27.5 % (ref 37–47)
HGB BLD-MCNC: 8.9 G/DL (ref 12–16)
LV EJECT FRACT  99904: 70
LV EJECT FRACT MOD 2C 99903: 25.72
LV EJECT FRACT MOD 4C 99902: 70.17
LV EJECT FRACT MOD BP 99901: 51.28
MCH RBC QN AUTO: 30.7 PG (ref 27–33)
MCHC RBC AUTO-ENTMCNC: 32.4 G/DL (ref 33.6–35)
MCV RBC AUTO: 94.8 FL (ref 81.4–97.8)
PLATELET # BLD AUTO: 173 K/UL (ref 164–446)
PMV BLD AUTO: 9.5 FL (ref 9–12.9)
POTASSIUM SERPL-SCNC: 3.7 MMOL/L (ref 3.6–5.5)
RBC # BLD AUTO: 2.9 M/UL (ref 4.2–5.4)
SODIUM SERPL-SCNC: 142 MMOL/L (ref 135–145)
WBC # BLD AUTO: 7.1 K/UL (ref 4.8–10.8)

## 2018-10-14 PROCEDURE — 80048 BASIC METABOLIC PNL TOTAL CA: CPT

## 2018-10-14 PROCEDURE — 700111 HCHG RX REV CODE 636 W/ 250 OVERRIDE (IP): Performed by: HOSPITALIST

## 2018-10-14 PROCEDURE — 93306 TTE W/DOPPLER COMPLETE: CPT | Mod: 26 | Performed by: INTERNAL MEDICINE

## 2018-10-14 PROCEDURE — 99239 HOSP IP/OBS DSCHRG MGMT >30: CPT | Performed by: HOSPITALIST

## 2018-10-14 PROCEDURE — 700102 HCHG RX REV CODE 250 W/ 637 OVERRIDE(OP): Performed by: INTERNAL MEDICINE

## 2018-10-14 PROCEDURE — A9270 NON-COVERED ITEM OR SERVICE: HCPCS | Performed by: INTERNAL MEDICINE

## 2018-10-14 PROCEDURE — 85027 COMPLETE CBC AUTOMATED: CPT

## 2018-10-14 PROCEDURE — 700101 HCHG RX REV CODE 250: Performed by: HOSPITALIST

## 2018-10-14 PROCEDURE — 700105 HCHG RX REV CODE 258: Performed by: HOSPITALIST

## 2018-10-14 PROCEDURE — 99233 SBSQ HOSP IP/OBS HIGH 50: CPT | Performed by: INTERNAL MEDICINE

## 2018-10-14 PROCEDURE — 93306 TTE W/DOPPLER COMPLETE: CPT

## 2018-10-14 RX ORDER — LISINOPRIL 20 MG/1
20 TABLET ORAL EVERY MORNING
Status: DISCONTINUED | OUTPATIENT
Start: 2018-10-14 | End: 2018-10-14 | Stop reason: HOSPADM

## 2018-10-14 RX ORDER — HYDROCHLOROTHIAZIDE 25 MG/1
25 TABLET ORAL EVERY MORNING
Status: DISCONTINUED | OUTPATIENT
Start: 2018-10-14 | End: 2018-10-14 | Stop reason: HOSPADM

## 2018-10-14 RX ORDER — AMOXICILLIN AND CLAVULANATE POTASSIUM 875; 125 MG/1; MG/1
1 TABLET, FILM COATED ORAL EVERY 12 HOURS
Status: DISCONTINUED | OUTPATIENT
Start: 2018-10-14 | End: 2018-10-14 | Stop reason: HOSPADM

## 2018-10-14 RX ORDER — AMOXICILLIN AND CLAVULANATE POTASSIUM 875; 125 MG/1; MG/1
1 TABLET, FILM COATED ORAL EVERY 12 HOURS
Status: DISCONTINUED | OUTPATIENT
Start: 2018-10-14 | End: 2018-10-14

## 2018-10-14 RX ORDER — AMOXICILLIN AND CLAVULANATE POTASSIUM 875; 125 MG/1; MG/1
1 TABLET, FILM COATED ORAL EVERY 12 HOURS
Qty: 14 TAB | Refills: 0 | Status: SHIPPED | OUTPATIENT
Start: 2018-10-14 | End: 2018-11-16

## 2018-10-14 RX ADMIN — METRONIDAZOLE 500 MG: 500 INJECTION, SOLUTION INTRAVENOUS at 06:12

## 2018-10-14 RX ADMIN — CEFTRIAXONE SODIUM 2 G: 2 INJECTION, POWDER, FOR SOLUTION INTRAMUSCULAR; INTRAVENOUS at 05:32

## 2018-10-14 RX ADMIN — LISINOPRIL 20 MG: 20 TABLET ORAL at 10:49

## 2018-10-14 RX ADMIN — HYDROCHLOROTHIAZIDE 25 MG: 25 TABLET ORAL at 11:24

## 2018-10-14 ASSESSMENT — ENCOUNTER SYMPTOMS
SPEECH DIFFICULTY: 0
COUGH: 0
ABDOMINAL PAIN: 0
NAUSEA: 0
BACK PAIN: 0
DIARRHEA: 0
CONSTIPATION: 0
SORE THROAT: 0
FATIGUE: 0
FACIAL ASYMMETRY: 0
CONFUSION: 0
ABDOMINAL DISTENTION: 0
APPETITE CHANGE: 0
BLOOD IN STOOL: 1
RECTAL PAIN: 0
BRUISES/BLEEDS EASILY: 0

## 2018-10-14 ASSESSMENT — PAIN SCALES - GENERAL
PAINLEVEL_OUTOF10: 0

## 2018-10-14 ASSESSMENT — PATIENT HEALTH QUESTIONNAIRE - PHQ9
1. LITTLE INTEREST OR PLEASURE IN DOING THINGS: NOT AT ALL
2. FEELING DOWN, DEPRESSED, IRRITABLE, OR HOPELESS: NOT AT ALL
SUM OF ALL RESPONSES TO PHQ9 QUESTIONS 1 AND 2: 0

## 2018-10-14 NOTE — DISCHARGE PLANNING
Agency/Facility Name: [facility]  Spoke To: Renan  Outcome: Faxed patients insurance cards per Arturo request

## 2018-10-14 NOTE — DISCHARGE PLANNING
Agency/Facility Name: [facility]  Spoke To: Marcus  Outcome: Per Marcus,  is around the corner, and should arrive before 5pm

## 2018-10-14 NOTE — FACE TO FACE
Face to Face Note  -  Durable Medical Equipment    Mario Whitlock M.D. - NPI: [id number]  I certify that this patient is under my care and that they had a durable medical equipment(DME)face to face encounter by myself that meets the physician DME face-to-face encounter requirements with this patient on:    Date of encounter:   Patient:                    MRN:                       YOB: 2018  Fior Ray  [id number]  1950     The encounter with the patient was in whole, or in part, for the following medical condition, which is the primary reason for durable medical equipment:  COPD    I certify that, based on my findings, the following durable medical equipment is medically necessary:  Oxygen.    HOME O2 Saturation Measurements:(Values must be present for Home Oxygen orders)  Room air sat at rest: 88  Room air sat with amb: 84  With liters of O2: 2, O2 sat at rest with O2: 97  With Liters of O2: 2, O2 sat with amb with O2 : 94  Is the patient mobile?: Yes    My Clinical findings support the need for the above equipment due to:  Hypoxia    Supporting Symptoms: low oxygen after upper GI bleed

## 2018-10-14 NOTE — DISCHARGE INSTRUCTIONS
Discharge Instructions    Discharged to home by car with relative. Discharged via wheelchair, hospital escort: Refused.  Special equipment needed: Oxygen    Be sure to schedule a follow-up appointment with your primary care doctor or any specialists as instructed.     Discharge Plan:   Diet Plan: Discussed  Activity Level: Discussed  Confirmed Follow up Appointment: Appointment Scheduled  Confirmed Symptoms Management: Discussed  Medication Reconciliation Updated: Yes  Influenza Vaccine Indication: Patient Refuses (pt wishes to wait until she feels better to get shot)    I understand that a diet low in cholesterol, fat, and sodium is recommended for good health. Unless I have been given specific instructions below for another diet, I accept this instruction as my diet prescription.   Other diet: Regular     Special Instructions: None    · Is patient discharged on Warfarin / Coumadin?   No     Depression / Suicide Risk    As you are discharged from this [facility] facility, it is important to learn how to keep safe from harming yourself.    Recognize the warning signs:  · Abrupt changes in personality, positive or negative- including increase in energy   · Giving away possessions  · Change in eating patterns- significant weight changes-  positive or negative  · Change in sleeping patterns- unable to sleep or sleeping all the time   · Unwillingness or inability to communicate  · Depression  · Unusual sadness, discouragement and loneliness  · Talk of wanting to die  · Neglect of personal appearance   · Rebelliousness- reckless behavior  · Withdrawal from people/activities they love  · Confusion- inability to concentrate     If you or a loved one observes any of these behaviors or has concerns about self-harm, here's what you can do:  · Talk about it- your feelings and reasons for harming yourself  · Remove any means that you might use to hurt yourself (examples: pills, rope, extension cords, firearm)  · Get  professional help from the community (Mental Health, Substance Abuse, psychological counseling)  · Do not be alone:Call your Safe Contact- someone whom you trust who will be there for you.  · Call your local CRISIS HOTLINE 595-7346 or 724-289-8695  · Call your local Children's Mobile Crisis Response Team [place] (741) 112-5380 or www.[employer]  · Call the toll free National Suicide Prevention Hotlines   · National Suicide Prevention Lifeline 758-959-FJMF (1434)  · National Hope Line Network 800-SUICIDE (728-0986)

## 2018-10-14 NOTE — DISCHARGE PLANNING
Agency/Facility Name: [facility]  Spoke To: Kayleigh  Outcome: Per Kayleigh, On Call  directed to call [facility] with ETA

## 2018-10-14 NOTE — PROGRESS NOTES
Critical Care Progress Note    Date of admission  10/12/2018    Chief Complaint  68 y.o. female admitted 10/12/2018 with bloody BMs    Hospital Course    68 y.o. female who presented 10/12/2018 with a past medical history significant for pulmonary embolism on Coumadin as well as a VSD with intermittent congestive heart failure who presents to the emergency department today after a large bloody bowel movement with associated left lower quadrant abdominal pain that started this morning.  Her abdominal was described as pure bright red blood and she had associated lightheadedness, diaphoresis, dizziness.  In the emergency department she was found to have 2 other episodes of moderate amount of bloody stool and had a drop in her hemoglobin from 12-10.  She was given vitamin K as well as 2 units of FFP and is receiving her second unit of packed red cells at the time of my evaluation.  She states the left lower quadrant abdominal pain has not resolved after the bowel movements and she denies any prior gastrointestinal hemorrhages nor knowledge of diverticulosis.  No family history that she is aware of of colon cancer and she has never had a colonoscopy herself.  She denies any hematemesis no nausea.        Interval Problem Update  Reviewed last 24 hour events:   - improving color of BMs   - Hgb dropped slightly   - desaturated at night   - low K   - resume anticoagulation   - abx for diverticulitis --> change to augmentin   - SBP elevated --> resume anti-HTNs    Review of Systems  Review of Systems   Constitutional: Negative for appetite change and fatigue.   HENT: Negative for nosebleeds and sore throat.    Eyes: Negative for visual disturbance.   Respiratory: Negative for cough.    Cardiovascular: Negative for chest pain.   Gastrointestinal: Positive for blood in stool. Negative for abdominal distention, abdominal pain, constipation, diarrhea, nausea and rectal pain.   Endocrine: Negative for polyuria.   Genitourinary:  Negative for difficulty urinating.   Musculoskeletal: Negative for back pain.   Skin: Negative for pallor.   Neurological: Negative for facial asymmetry and speech difficulty.   Hematological: Does not bruise/bleed easily.   Psychiatric/Behavioral: Negative for behavioral problems and confusion.   All other systems reviewed and are negative.       Vital Signs for last 24 hours   Temp:  [36.7 °C (98.1 °F)-37.1 °C (98.8 °F)] 37 °C (98.6 °F)  Pulse:  [58-82] 77  Resp:  [14-42] 42    Hemodynamic parameters for last 24 hours       Vent Settings for last 24 hours   2-3 lpm n/c    Physical Exam   Physical Exam   Constitutional: She is oriented to person, place, and time. She appears well-developed and well-nourished. No distress.   Obese, pleasant female   HENT:   Head: Normocephalic and atraumatic.   Right Ear: External ear normal.   Left Ear: External ear normal.   Nose: Nose normal.   Mouth/Throat: No oropharyngeal exudate.   Abnormal dentition   Eyes: Pupils are equal, round, and reactive to light. Conjunctivae and EOM are normal. Right eye exhibits no discharge. Left eye exhibits no discharge.   Neck: Neck supple. No thyromegaly present.   Cardiovascular: Normal rate, regular rhythm and normal heart sounds.  Exam reveals no friction rub.    No murmur heard.  Pulmonary/Chest: Effort normal and breath sounds normal. No respiratory distress. She has no rales.   Abdominal: Soft. Bowel sounds are normal. There is no tenderness. There is no rebound and no guarding.   Musculoskeletal: She exhibits edema. She exhibits no tenderness or deformity.   Lymphadenopathy:     She has no cervical adenopathy.   Neurological: She is alert and oriented to person, place, and time. No cranial nerve deficit. She exhibits normal muscle tone.   Skin: Skin is warm and dry. She is not diaphoretic. No erythema.   Psychiatric: She has a normal mood and affect. Her behavior is normal. Judgment and thought content normal.   Nursing note and vitals  reviewed.      Medications  Current Facility-Administered Medications   Medication Dose Route Frequency Provider Last Rate Last Dose   • benzonatate (TESSALON) capsule 200 mg  200 mg Oral TID PRN Petar Maguire M.D.       • Respiratory Care per Protocol   Nebulization Continuous RT Petar Maguire M.D.       • ondansetron (ZOFRAN) syringe/vial injection 4 mg  4 mg Intravenous Q4HRS PRN Petar Maguire M.D.       • ondansetron (ZOFRAN ODT) dispertab 4 mg  4 mg Oral Q4HRS PRN Petar Maguire M.D.       • acetaminophen (TYLENOL) tablet 650 mg  650 mg Oral Q6HRS PRN Petar Maguire M.D.       • oxyCODONE-acetaminophen (PERCOCET) 5-325 MG per tablet 1 Tab  1 Tab Oral Q6HRS PRN Petar Maguire M.D.       • cefTRIAXone (ROCEPHIN) 2 g in  mL IVPB  2 g Intravenous Q24HRS Petar Maguire M.D.   Stopped at 10/14/18 0602   • metroNIDAZOLE (FLAGYL) IVPB 500 mg  500 mg Intravenous Q8HRS Petar Maguire M.D. 100 mL/hr at 10/14/18 0612 500 mg at 10/14/18 0612       Fluids    Intake/Output Summary (Last 24 hours) at 10/14/18 0857  Last data filed at 10/14/18 0800   Gross per 24 hour   Intake             2260 ml   Output             2425 ml   Net             -165 ml       Laboratory  Recent Results (from the past 48 hour(s))   CBC WITHOUT DIFFERENTIAL    Collection Time: 10/12/18  9:52 AM   Result Value Ref Range    WBC 8.2 4.8 - 10.8 K/uL    RBC 3.30 (L) 4.20 - 5.40 M/uL    Hemoglobin 10.1 (L) 12.0 - 16.0 g/dL    Hematocrit 31.2 (L) 37.0 - 47.0 %    MCV 94.5 81.4 - 97.8 fL    MCH 30.6 27.0 - 33.0 pg    MCHC 32.4 (L) 33.6 - 35.0 g/dL    RDW 47.9 35.9 - 50.0 fL    Platelet Count 235 164 - 446 K/uL    MPV 9.2 9.0 - 12.9 fL   FRESH FROZEN PLASMA    Collection Time: 10/12/18  9:52 AM   Result Value Ref Range    Component Ft       TA                  Thawed Plasma       [id number]   transfused   10/12/18   10:03      Product Type Thawed Apheresis Plasma     Dispense Status Transfused      Unit Number (Barcoded) [id number]     Product Code (Barcoded) U5259X45     Blood Type (Barcoded) 6200     Component Ft       TA                  Thawed Plasma       [id number]   transfused   10/12/18   10:32      Product Type Thawed Apheresis Plasma     Dispense Status Transfused     Unit Number (Barcoded) [id number]     Product Code (Barcoded) [id number]     Blood Type (Barcoded) 6200    PLATELET MAPPING WITH BASIC TEG    Collection Time: 10/12/18 12:30 PM   Result Value Ref Range    Reaction Time Initial-R 4.8 (L) 5.0 - 10.0 min    Clot Kinetics-K 1.3 1.0 - 3.0 min    Clot Angle-Angle 71.2 53.0 - 72.0 degrees    Maximum Clot Strength-MA 70.7 (H) 50.0 - 70.0 mm    Lysis 30 minutes-LY30 0.0 0.0 - 8.0 %    % Inhibition ADP 4.3 %    % Inhibition AA 0.0 %    TEG Algorithm Link Algorithm    HGB    Collection Time: 10/12/18 12:30 PM   Result Value Ref Range    Hemoglobin 11.0 (L) 12.0 - 16.0 g/dL   PROTHROMBIN TIME    Collection Time: 10/12/18 12:30 PM   Result Value Ref Range    PT 21.2 (H) 12.0 - 14.6 sec    INR 1.82 (H) 0.87 - 1.13   HEMOGLOBIN A1C    Collection Time: 10/12/18 12:30 PM   Result Value Ref Range    Glycohemoglobin 6.1 (H) 0.0 - 5.6 %    Est Avg Glucose 128 mg/dL   HGB    Collection Time: 10/12/18  8:32 PM   Result Value Ref Range    Hemoglobin 9.7 (L) 12.0 - 16.0 g/dL   CBC with Differential    Collection Time: 10/13/18  4:55 AM   Result Value Ref Range    WBC 6.9 4.8 - 10.8 K/uL    RBC 2.96 (L) 4.20 - 5.40 M/uL    Hemoglobin 9.0 (L) 12.0 - 16.0 g/dL    Hematocrit 27.8 (L) 37.0 - 47.0 %    MCV 93.9 81.4 - 97.8 fL    MCH 30.4 27.0 - 33.0 pg    MCHC 32.4 (L) 33.6 - 35.0 g/dL    RDW 53.0 (H) 35.9 - 50.0 fL    Platelet Count 182 164 - 446 K/uL    MPV 9.5 9.0 - 12.9 fL    Neutrophils-Polys 56.00 44.00 - 72.00 %    Lymphocytes 29.40 22.00 - 41.00 %    Monocytes 8.50 0.00 - 13.40 %    Eosinophils 4.90 0.00 - 6.90 %    Basophils 0.60 0.00 - 1.80 %    Immature Granulocytes 0.60 0.00 - 0.90 %    Nucleated  RBC 0.00 /100 WBC    Neutrophils (Absolute) 3.89 2.00 - 7.15 K/uL    Lymphs (Absolute) 2.04 1.00 - 4.80 K/uL    Monos (Absolute) 0.59 0.00 - 0.85 K/uL    Eos (Absolute) 0.34 0.00 - 0.51 K/uL    Baso (Absolute) 0.04 0.00 - 0.12 K/uL    Immature Granulocytes (abs) 0.04 0.00 - 0.11 K/uL    NRBC (Absolute) 0.00 K/uL   Basic Metabolic Panel (BMP)    Collection Time: 10/13/18  4:55 AM   Result Value Ref Range    Sodium 143 135 - 145 mmol/L    Potassium 3.4 (L) 3.6 - 5.5 mmol/L    Chloride 111 96 - 112 mmol/L    Co2 26 20 - 33 mmol/L    Glucose 105 (H) 65 - 99 mg/dL    Bun 11 8 - 22 mg/dL    Creatinine 0.50 0.50 - 1.40 mg/dL    Calcium 7.9 (L) 8.5 - 10.5 mg/dL    Anion Gap 6.0 0.0 - 11.9   ESTIMATED GFR    Collection Time: 10/13/18  4:55 AM   Result Value Ref Range    GFR If African American >60 >60 mL/min/1.73 m 2    GFR If Non African American >60 >60 mL/min/1.73 m 2   PROTHROMBIN TIME    Collection Time: 10/13/18  5:11 AM   Result Value Ref Range    PT 15.9 (H) 12.0 - 14.6 sec    INR 1.26 (H) 0.87 - 1.13   HGB    Collection Time: 10/13/18  1:10 PM   Result Value Ref Range    Hemoglobin 9.2 (L) 12.0 - 16.0 g/dL   CBC WITHOUT DIFFERENTIAL    Collection Time: 10/14/18  4:40 AM   Result Value Ref Range    WBC 7.1 4.8 - 10.8 K/uL    RBC 2.90 (L) 4.20 - 5.40 M/uL    Hemoglobin 8.9 (L) 12.0 - 16.0 g/dL    Hematocrit 27.5 (L) 37.0 - 47.0 %    MCV 94.8 81.4 - 97.8 fL    MCH 30.7 27.0 - 33.0 pg    MCHC 32.4 (L) 33.6 - 35.0 g/dL    RDW 51.1 (H) 35.9 - 50.0 fL    Platelet Count 173 164 - 446 K/uL    MPV 9.5 9.0 - 12.9 fL   BASIC METABOLIC PANEL    Collection Time: 10/14/18  4:40 AM   Result Value Ref Range    Sodium 142 135 - 145 mmol/L    Potassium 3.7 3.6 - 5.5 mmol/L    Chloride 108 96 - 112 mmol/L    Co2 28 20 - 33 mmol/L    Glucose 105 (H) 65 - 99 mg/dL    Bun 9 8 - 22 mg/dL    Creatinine 0.57 0.50 - 1.40 mg/dL    Calcium 8.3 (L) 8.5 - 10.5 mg/dL    Anion Gap 6.0 0.0 - 11.9   ESTIMATED GFR    Collection Time: 10/14/18  4:40 AM    Result Value Ref Range    GFR If African American >60 >60 mL/min/1.73 m 2    GFR If Non African American >60 >60 mL/min/1.73 m 2       Imaging  X-Ray:  No film today 10/14    Assessment/Plan  * Rectal bleeding   Assessment & Plan    Secondary to diverticulitis in the setting of Coumadin use  Gastroenterology following with planned colonoscopy in 2-3 weeks  Diet  Daily CBC        Acute diverticulitis   Assessment & Plan    Continue antibiotic (changed to p.o. Augmentin), analgesics as needed  Diet modification instructions given        Coagulopathy (HCC)   Assessment & Plan    Chronic for pulmonary embolism  Status post vitamin K and FFP  Holding Coumadin for now until cleared by GI as outpatient        Anemia associated with acute blood loss   Assessment & Plan    Unchanged  Conservative transfusion strategy with goal hemoglobin greater than 7  Daily CBC        Hypoxia   Assessment & Plan    Secondary to atelectasis  Arrange for home oxygen  Encourage incentive spirometry/out of bed to chair        TOMI (obstructive sleep apnea)   Assessment & Plan    Diagnosed in [place] but noncompliant  Arrange for supplemental oxygen to go home with at night  Outpatient pulmonary follow-up for repeat sleep study        Essential hypertension   Assessment & Plan    Goal systolic blood pressure less than 140  Resume HydroDIURIL and lisinopril        History of pulmonary embolism   Assessment & Plan    Provoked after knee surgery 6 years ago  Holding anticoagulants for now until cleared outpatient by GI        Hypocalcemia   Assessment & Plan    In the setting of blood transfusions  Repleted        Hypokalemia   Assessment & Plan    Replete         VSD (ventricular septal defect)   Assessment & Plan    Chronic, controlled  Monitor fluid status closely to prevent pulmonary edema        Hyperglycemia   Assessment & Plan    Insulin sliding scale         Obesity due to excess calories   Assessment & Plan    RD following  Encourage  weight loss             VTE:  Contraindicated  Ulcer: Not Indicated  Lines: None    I have performed a physical exam and reviewed and updated ROS and Plan today (10/14/2018). In review of yesterday's note (10/13/2018), there are no changes except as documented above.     Ok to transfer patient out of ICU today. [facility] will sign off at transfer. Please call with questions.    Discussed patient condition and risk of morbidity and/or mortality with Hospitalist, RN, RT, Pharmacy, Charge nurse / hot rounds, Patient and GI

## 2018-10-14 NOTE — PROGRESS NOTES
Notified DR. Rueda that patient had received replacements of potassium and calcium during the day and the only lab ordered for this am was an H&H. Orders to draw a cbc and bmp given. No further orders will continue to monitor.

## 2018-10-14 NOTE — DISCHARGE SUMMARY
Discharge Summary    CHIEF COMPLAINT ON ADMISSION  Chief Complaint   Patient presents with   • Bloody Stools       Reason for Admission  Blood in stool     Admission Date  10/12/2018    CODE STATUS  Full Code    HPI & HOSPITAL COURSE  This is a 68 y.o. female here with rectal bleeding.    Ms. Ray has a history of pulmonary embolism on chronic coumadin therapy that presented to the ER with lower abdominal cramping and at least 6 episodes of rectal bleeding. She had a CT of the abdomen revealing diverticulitis. 2 units of RBCs and 2 units of FFP have been transfused. She has been admitted to the ICU with GI consultation. Her bleeding resolved. She was treated with IV rocephin and flagyl which has been transitioned to oral augmentin.       Hb is 8.9 this morning. RN notes that she has TOMI with decrease sats down to the 70's. I discussed with patient and family that she may benefit from an outpatient sleep study.    Today I had a long discussion with patient, her , and her daughter about the need to remain off coumadin until cleared by GI after her outpatient colonoscopy. She had a DVT 25 years ago and another DVT and PE 5 years ago after knee surgery. It may be appropriate for her to remain off anticoagulation indefinitely at this point.   She will go home on 7 days of augmentin. Due to hypoxia, home oxygen has been set up. Diet, exercise, and weight loss were discussed.     Therefore, she is discharged in good and stable condition to home with close outpatient follow-up.    The patient met 2-midnight criteria for an inpatient stay at the time of discharge.    Discharge Date  10/14/18    FOLLOW UP ITEMS POST DISCHARGE  GI  I called the discharge planners and they will set her up with a PCP [facility].    DISCHARGE DIAGNOSES  Principal Problem:    Rectal bleeding POA: Unknown  Active Problems:    Anemia associated with acute blood loss POA: Unknown    Acute diverticulitis POA: Unknown    History of  pulmonary embolism POA: Unknown    Essential hypertension POA: Unknown    Obesity due to excess calories POA: Unknown    Hyperglycemia POA: Unknown    VSD (ventricular septal defect) POA: Unknown    Hypokalemia POA: Unknown    Hypocalcemia POA: Unknown    Coagulopathy (HCC) POA: Unknown    TOMI (obstructive sleep apnea) POA: Unknown    Hypoxia POA: Unknown  Resolved Problems:    * No resolved hospital problems. *      FOLLOW UP  No future appointments.  No follow-up provider specified.    MEDICATIONS ON DISCHARGE     Medication List      START taking these medications      Instructions   amoxicillin-clavulanate 875-125 MG Tabs  Commonly known as:  AUGMENTIN   Take 1 Tab by mouth every 12 hours.  Dose:  1 Tab        CONTINUE taking these medications      Instructions   hydroCHLOROthiazide 25 MG Tabs  Commonly known as:  HYDRODIURIL   Take 25 mg by mouth every morning.  Dose:  25 mg     lisinopril 20 MG Tabs  Commonly known as:  PRINIVIL   Take 20 mg by mouth every morning.  Dose:  20 mg        STOP taking these medications    warfarin 5 MG Tabs  Commonly known as:  COUMADIN            Allergies  Allergies   Allergen Reactions   • Mustard [Allyl Isothiocyanate] Anaphylaxis   • Prochlorperazine Edisylate [Compazine] Anaphylaxis       DIET  Orders Placed This Encounter   Procedures   • Diet Order Regular     Standing Status:   Standing     Number of Occurrences:   1     Order Specific Question:   Diet:     Answer:   Regular [1]       ACTIVITY  Low residue diet    CONSULTATIONS  Critical Care.  Dr. Reagan, GI who will f/u outpatient for a colonoscopy.         LABORATORY  Lab Results   Component Value Date    SODIUM 142 10/14/2018    POTASSIUM 3.7 10/14/2018    CHLORIDE 108 10/14/2018    CO2 28 10/14/2018    GLUCOSE 105 (H) 10/14/2018    BUN 9 10/14/2018    CREATININE 0.57 10/14/2018    CREATININE 0.7 05/17/2006    due to a history of VSD, an echo was ordered and results are pending at time of discharge.    Lab Results    Component Value Date    WBC 7.1 10/14/2018    HEMOGLOBIN 8.9 (L) 10/14/2018    HEMATOCRIT 27.5 (L) 10/14/2018    PLATELETCT 173 10/14/2018    CT abdomen:  1.  Sigmoid diverticulitis without gross evidence of perforation. Underlying neoplasm is not excluded. Follow-up is recommended.  2.  Ventral hernia defect containing transverse colon. No evidence of obstruction  3.  Trace atherosclerosis.  4.  Inferior vena cava filter.  5.  Status post hysterectomy    Total time of the discharge process exceeds 32 minutes.

## 2018-10-14 NOTE — PROGRESS NOTES
Pt resting comfortably sitting in chair visiting with  in no acute distress at this time awaiting home O2 tank which is en-route to hospital at this time.

## 2018-10-14 NOTE — CARE PLAN
Problem: Skin Integrity  Goal: Risk for impaired skin integrity will decrease  Outcome: PROGRESSING AS EXPECTED    Intervention: Implement precautions to protect skin integrity in collaboration with the interdisciplinary team   10/13/18 2000   OTHER   Skin Preventative Measures Pillows in Use to Float Heels;Pillows in Use for Support / Positioning;Silicone Oxygen Tubing in Use   Bed Types Low Air Loss Mattress   Friction Interventions Draw Sheet / Pad Used for Repositioning;Sacral Foam Dressing in Use   PT / OT Involved in Care (MD to place order as appropriate)   Activity  Bed;Ambulated;Edge of bed;Range of motion;Up to bathroom   Patient Turns / Repositioning Patient Turns Self from Side to Side   Assistance / Tolerance for Turning/Repositioning Standby Assist;Tolerates Well   Patient is Receiving Nutrition Oral Intake Adequate   Nutrition Consult Ordered No, Consult has Already been Placed   Vitamin Therapy in Use No         Problem: Respiratory:  Goal: Respiratory status will improve  Outcome: PROGRESSING AS EXPECTED    Intervention: Assess and monitor pulmonary status   10/13/18 2000   Vitals   Respiration 20   Pulse Oximetry 98 %   OTHER   O2 (LPM) 2   RUL Breath Sounds Clear;Diminished   RML Breath Sounds Diminished   RLL Breath Sounds Diminished   NATALEE Breath Sounds Clear   LLL Breath Sounds Diminished

## 2018-10-14 NOTE — DISCHARGE PLANNING
Anticipated Discharge Disposition: Home with home oxygen    Action: LSW met with pt at bedside and explained pt's oxygen order. Pt's insurance company stated their contracted provider is [facility]. Pt completed choice forma and LSW faxed to [facility].    Barriers to Discharge: None    Plan: [employer] to deliver oxygen to hospital before d/c

## 2018-10-14 NOTE — DISCHARGE PLANNING
Received Choice form at 3448  Agency/Facility Name: [facility]  Referral sent per Choice form @ 0981

## 2018-10-14 NOTE — DISCHARGE PLANNING
Agency/Facility Name: [facility]  Spoke To: Brian  Outcome: Per Brian, They are current working on patents referral with updated insurance info.  Directed Brian to call [facility] with update

## 2018-10-14 NOTE — PROGRESS NOTES
Pt given D/C instructions and PIV's pulled. Pt awaits home O2 to be delivered at this time. Pt denies any questions.

## 2018-10-14 NOTE — DISCHARGE PLANNING
Agency/Facility Name: [facility]  Spoke To: Linda  Outcome: Per Linda, O2 is en route with On call      EDIS Zheng notified

## 2018-10-15 NOTE — PROGRESS NOTES
Portable O2 delivered at this time, pt educated on how to use equipment and denies any questions, pt left via wheelchair to home.

## 2018-10-15 NOTE — ASSESSMENT & PLAN NOTE
Diagnosed in [place] but noncompliant  Arrange for supplemental oxygen to go home with at night  Outpatient pulmonary follow-up for repeat sleep study

## 2018-10-15 NOTE — ASSESSMENT & PLAN NOTE
Secondary to atelectasis  Arrange for home oxygen  Encourage incentive spirometry/out of bed to chair

## 2018-11-07 ENCOUNTER — TELEPHONE (OUTPATIENT)
Dept: MEDICAL GROUP | Facility: PHYSICIAN GROUP | Age: 68
End: 2018-11-07

## 2018-11-07 NOTE — TELEPHONE ENCOUNTER
Future Appointments       Provider Department Center    11/8/2018 2:15 PM Jennifer Garcia M.D. [facility]        NEW PATIENT VISIT PRE-VISIT PLANNING    1.  [employer] Patient is checked in Patient Demographics? YES    2.  Immunizations were updated in Epic using WebIZ?: No WebIZ record       •  Web Iz Recommendations: FLU, PREVNAR (PCV13) , TDAP and ZOSTAVAX (Shingles)    3.  Is this appointment scheduled as a Hospital Follow-Up? No    4.  Patient is due for the following Health Maintenance Topics:   Health Maintenance Due   Topic Date Due   • Annual Wellness Visit  1950   • IMM DTaP/Tdap/Td Vaccine (1 - Tdap) 03/23/1969   • PAP SMEAR  03/23/1971   • COLONOSCOPY  03/23/2000   • IMM ZOSTER VACCINES (1 of 2) 03/23/2000   • MAMMOGRAM  01/12/2006   • BONE DENSITY  03/23/2015   • IMM PNEUMOCOCCAL (1 of 2 - PCV13) 03/23/2015   • IMM INFLUENZA (1) 09/01/2018       5.  Reviewed/Updated the following with patient:   •   Preferred Pharmacy? NO       •   Preferred Lab? NO       •   Preferred Communication? NO       •   Allergies? NO       •   Medications? NO       •   Social History? NO       •   Family History (document living status of immediate family members and if + hx of cancer, diabetes, hypertension, hyperlipidemia, heart attack, stroke) NO    6.  Updated Care Team?       •   DME Company (gait device, O2, CPAP, etc.) NO       •   Other Specialists (eye doctor, derm, GYN, cardiology, endo, etc): NO    7.  MDX printed for Provider? NO    8.  Patient was informed to arrive 15 min prior to their   scheduled appointment and bring in their medication bottles. LVM was unable to get in contact with Pt. prior to visit to complete PVP.

## 2018-11-16 ENCOUNTER — HOSPITAL ENCOUNTER (EMERGENCY)
Facility: MEDICAL CENTER | Age: 68
End: 2018-11-16
Attending: EMERGENCY MEDICINE
Payer: MEDICARE

## 2018-11-16 VITALS
SYSTOLIC BLOOD PRESSURE: 148 MMHG | WEIGHT: 284.39 LBS | DIASTOLIC BLOOD PRESSURE: 82 MMHG | OXYGEN SATURATION: 95 % | HEIGHT: 64 IN | TEMPERATURE: 99 F | HEART RATE: 85 BPM | RESPIRATION RATE: 16 BRPM | BODY MASS INDEX: 48.55 KG/M2

## 2018-11-16 DIAGNOSIS — L03.114 CELLULITIS OF LEFT UPPER EXTREMITY: ICD-10-CM

## 2018-11-16 DIAGNOSIS — Z76.0 MEDICATION REFILL: ICD-10-CM

## 2018-11-16 DIAGNOSIS — L02.91 ABSCESS: ICD-10-CM

## 2018-11-16 PROCEDURE — 99284 EMERGENCY DEPT VISIT MOD MDM: CPT | Mod: 25

## 2018-11-16 PROCEDURE — 303977 HCHG I & D

## 2018-11-16 RX ORDER — SULFAMETHOXAZOLE AND TRIMETHOPRIM 800; 160 MG/1; MG/1
1 TABLET ORAL 2 TIMES DAILY
Qty: 14 TAB | Refills: 0 | Status: SHIPPED | OUTPATIENT
Start: 2018-11-16 | End: 2018-11-23

## 2018-11-16 RX ORDER — CEPHALEXIN 500 MG/1
500 CAPSULE ORAL 4 TIMES DAILY
Qty: 28 CAP | Refills: 0 | Status: SHIPPED | OUTPATIENT
Start: 2018-11-16 | End: 2018-11-23

## 2018-11-16 RX ORDER — LISINOPRIL 20 MG/1
20 TABLET ORAL DAILY
Qty: 30 TAB | Refills: 0 | Status: SHIPPED | OUTPATIENT
Start: 2018-11-16 | End: 2018-12-16

## 2018-11-16 RX ORDER — HYDROCHLOROTHIAZIDE 25 MG/1
25 TABLET ORAL EVERY MORNING
Qty: 30 TAB | Refills: 0 | Status: SHIPPED | OUTPATIENT
Start: 2018-11-16 | End: 2019-01-21 | Stop reason: SDUPTHER

## 2018-11-16 ASSESSMENT — ENCOUNTER SYMPTOMS
CHILLS: 0
MYALGIAS: 0
FEVER: 0

## 2018-11-16 ASSESSMENT — PAIN SCALES - GENERAL: PAINLEVEL_OUTOF10: 0

## 2018-11-16 NOTE — ED PROVIDER NOTES
ED Provider Note    Scribed for Daisy Lainez D.O. by Rodolfo Moran. 11/16/2018, 12:14 PM.    Primary care provider: Pcp Pt States None  Means of arrival: Walk In  History obtained from: Patient  History limited by: None    CHIEF COMPLAINT  Chief Complaint   Patient presents with   • Abscess     Left hand    • Medication Refill       HPI  Fior Ray is a 68 y.o. female who presents to the Emergency Department for evaluation of an abscess present to her left hand for the last three days. She states she soaked her hand in salt water last night and when she woke up her abscess had gotten bigger, thus prompting her to come into the ER. Fior believes her abscess is secondary to either a bite or splinter, that she got, as she was moving down the staircase on her wood banister. She notes pain to her left hand at the location of the abscess. She denies any fevers, chills, or body aches.    Fior is also here for a medication refill of her Hydrochlorothiazide, and lisinopril as she moved here recently from [place] and has been unable to see doctor and get her prescriptions updated.      REVIEW OF SYSTEMS  Review of Systems   Constitutional: Negative for chills and fever.   Musculoskeletal: Negative for myalgias.   Skin:        Positive for: Abscess       PAST MEDICAL HISTORY   has a past medical history of Hypertension and VSD (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    SURGICAL HISTORY   has a past surgical history that includes knee arthroplasty total (Right, 2014); hysterectomy, vaginal; and vein stripping.    SOCIAL HISTORY  Social History   Substance Use Topics   • Smoking status: Never Smoker   • Smokeless tobacco: Never Used   • Alcohol use No      History   Drug Use No       FAMILY HISTORY  Family History   Problem Relation Age of Onset   • Lung Disease Mother    • Heart Attack Father        CURRENT MEDICATIONS  Home Medications     Reviewed by Mame Wiggins R.N. (Registered  "Nurse) on 11/16/18 at 1108  Med List Status: Partial   Medication Last Dose Status   hydrochlorothiazide (HYDRODIURIL) 25 MG TABS 10/11/2018 Active   lisinopril (PRINIVIL) 20 MG TABS 10/11/2018 Active                ALLERGIES  Allergies   Allergen Reactions   • Mustard [Allyl Isothiocyanate] Anaphylaxis   • Prochlorperazine Edisylate [Compazine] Anaphylaxis       PHYSICAL EXAM  VITAL SIGNS: /85   Pulse 99   Temp 37.2 °C (99 °F) (Temporal)   Resp 14   Ht 1.626 m (5' 4\")   Wt (!) 129 kg (284 lb 6.3 oz)   SpO2 93%   BMI 48.82 kg/m²   Vitals reviewed.  Constitutional: Patient is oriented to person, place, and time. Appears well-developed and well-nourished. No distress.    Head: Normocephalic and atraumatic.   Ears: Normal external ears bilaterally.   Eyes: Conjunctivae are normal.  Cardiovascular: Normal rate, regular rhythm and normal heart sounds. Normal peripheral pulses, bilateral upper extremities.  Pulmonary/Chest: Effort normal and breath sounds normal. No respiratory distress, no wheezes, rhonchi, or rales.  Musculoskeletal: No edema and no tenderness. Left hand thenar eminence with erythema and subcutaneous pus and swelling  Neurological: No focal deficits.   Skin: Skin is warm and dry. No erythema. No pallor.   Psychiatric: Patient has a normal mood and affect.       Incision and Drainage Procedure Note    Indication: Abscess    Procedure: The patient was positioned appropriately and the skin over the incision site was prepped with chlorhexidine. Local anesthesia was obtained by infiltration using 1% Lidocaine without epinephrine.  An incision was then made over the apex of the lesion and approximately 1 cc of thick, foul smelling and purulent material was expressed. Loculations were broken up using forceps and more of the material was able to be expressed. The drainage cavity was then irrigated. The patient’s tetanus status was up to date and did not require a booster dose.    The patient " tolerated the procedure well.    Complications: None      COURSE & MEDICAL DECISION MAKING  Pertinent Labs & Imaging studies reviewed. (See chart for details)    Obtained and reviewed past medical records from 10/12/18 which indicated the patient was admitted for hematochezia at [facility].    12:14 PM - Patient seen and examined at bedside.  Female who presents with a abscess to the thenar eminence of her left hand.  This is her dominant hand.  She has no systemic symptoms.  No fever.  She has normal vital signs.  She is also in need of a medication refill as she is care doctor yet for this.  She will need incision and drainage.  This plan of care.  I informed the patient I would like to incise and drain her abscess, and she will likely be discharged home on antibiotics to which she is comfortable with.    1:05 PM - I performed an incision and drainage procedure at this time as outlined above. She will be discharged at this time with her refilled medications, along with two antibiotics to cover any possible infection of her abscess. She understands and is comfortable with discharge.  He is given strict return precautions.  She understands, this will needs to be reevaluated in approximately 48 hours unless it is worsening, then she should return immediately for reevaluation.  She can continue to soak it and apply warm moist compresses.  Patient was grateful for her care.  She is well-appearing and nontoxic.      The patient will return for new or worsening symptoms and is stable at the time of discharge.    DISPOSITION:  Patient will be discharged home in stable condition.    FOLLOW UP:  [facility], Emergency Dept  [facility] [facility] [address] [phone]    This wound needs to be rechecked in 1-2 days unless it is not improving and then return immediately.      OUTPATIENT MEDICATIONS:  Discharge Medication List as of 11/16/2018  1:39 PM      START taking these medications     Details   sulfamethoxazole-trimethoprim (BACTRIM DS) 800-160 MG tablet Take 1 Tab by mouth 2 times a day for 7 days., Disp-14 Tab, R-0, Print Rx Paper      cephALEXin (KEFLEX) 500 MG Cap Take 1 Cap by mouth 4 times a day for 7 days., Disp-28 Cap, R-0, Print Rx Paper             FINAL IMPRESSION  1. Abscess    2. Cellulitis of left upper extremity    3. Medication refill          IRodolfo (Scribe), am scribing for, and in the presence of, Daisy Lainez D.O..    Electronically signed by: Rodolfo Moran (Scribe), 11/16/2018    I, Daisy Lainez D.O. personally performed the services described in this documentation, as scribed by Rodolfo Moran in my presence, and it is both accurate and complete. E.    The note accurately reflects work and decisions made by me.  Daisy Lainez  11/16/2018  8:12 PM

## 2018-11-16 NOTE — ED TRIAGE NOTES
Chief Complaint   Patient presents with   • Abscess     Left hand    • Medication Refill   Pt to triage in NAD.  Pt reports wound to left hand x 3 days, no injury/puncture.  Pt additionally reports she has been unable to get prescription medications filled, out of medications x 3 weeks.  Pt educated on triage process and instructed to notify triage RN of any change in status.

## 2018-11-16 NOTE — ED NOTES
Pt discharged to home.  Pt given discharge paperwork and all applicable prescriptions written by provider.  Pt to follow up with PCP where indicated in discharge instructions.  Pt verbalized understanding of discharge teaching, medication instructions, and will return to ED if condition worsens.

## 2018-12-13 ENCOUNTER — PATIENT OUTREACH (OUTPATIENT)
Dept: HEALTH INFORMATION MANAGEMENT | Facility: OTHER | Age: 68
End: 2018-12-13

## 2018-12-13 NOTE — PROGRESS NOTES
Outcome: Left Message    Please transfer to Patient Outreach Team at [phone] when patient returns call.    WebIZ Checked & Epic Updated:  yes    HealthConnect Verified: yes    Attempt # 1

## 2019-01-18 ENCOUNTER — TELEPHONE (OUTPATIENT)
Dept: MEDICAL GROUP | Facility: PHYSICIAN GROUP | Age: 69
End: 2019-01-18

## 2019-01-18 RX ORDER — LISINOPRIL 20 MG/1
20 TABLET ORAL DAILY
COMMUNITY
End: 2019-01-21 | Stop reason: SDUPTHER

## 2019-01-18 NOTE — TELEPHONE ENCOUNTER
Future Appointments       Provider Department Center    1/21/2019 8:55 AM Jennifer Garcia M.D. [facility]      NEW PATIENT VISIT PRE-VISIT PLANNING    1.  EpicCare Patient is checked in Patient Demographics? YES    2.  Immunizations were updated in [facility] using WebIZ?: Yes       •  Web Iz Recommendations: FLU, PREVNAR (PCV13) , TDAP and SHINGRIX (Shingles)    3.  Is this appointment scheduled as a Hospital Follow-Up? No    4.  Patient is due for the following Health Maintenance Topics:   Health Maintenance Due   Topic Date Due   • Annual Wellness Visit  1950   • IMM DTaP/Tdap/Td Vaccine (1 - Tdap) 03/23/1969   • PAP SMEAR  03/23/1971   • COLONOSCOPY  03/23/2000   • IMM ZOSTER VACCINES (1 of 2) 03/23/2000   • MAMMOGRAM  01/12/2006   • BONE DENSITY  03/23/2015   • IMM PNEUMOCOCCAL  (1 of 2 - PCV13) 03/23/2015   • IMM INFLUENZA (1) 09/01/2018     5. Orders for overdue Health Maintenance topics pended in Pre-Charting? YES    6.  Reviewed/Updated the following with patient:   •   Preferred Pharmacy? YES       •   Preferred Lab? YES       •   Preferred Communication? YES       •   Allergies? YES       •   Medications? YES. Was Abstract Encounter opened and chart updated? YES       •   Social History? YES. Was Abstract Encounter opened and chart updated? YES       •   Family History (document living status of immediate family members and if + hx of cancer, diabetes, hypertension, hyperlipidemia, heart attack, stroke) YES. Was Abstract Encounter opened and chart updated? YES    7.  Updated Care Team?       •   DME Company (gait device, O2, CPAP, etc.) YES [employer]        •   Other Specialists (eye doctor, derm, GYN, cardiology, endo, etc): YES Pt. Will bring into visit     8.  Patient was informed to arrive 15 min prior to their   scheduled appointment and bring in their medication bottles.

## 2019-01-21 ENCOUNTER — HOSPITAL ENCOUNTER (OUTPATIENT)
Dept: LAB | Facility: MEDICAL CENTER | Age: 69
End: 2019-01-21
Attending: FAMILY MEDICINE
Payer: MEDICARE

## 2019-01-21 ENCOUNTER — OFFICE VISIT (OUTPATIENT)
Dept: MEDICAL GROUP | Facility: PHYSICIAN GROUP | Age: 69
End: 2019-01-21
Payer: MEDICARE

## 2019-01-21 VITALS
DIASTOLIC BLOOD PRESSURE: 78 MMHG | SYSTOLIC BLOOD PRESSURE: 132 MMHG | WEIGHT: 284 LBS | HEART RATE: 80 BPM | TEMPERATURE: 98.9 F | BODY MASS INDEX: 48.49 KG/M2 | HEIGHT: 64 IN | OXYGEN SATURATION: 93 %

## 2019-01-21 DIAGNOSIS — Q21.0 VSD (VENTRICULAR SEPTAL DEFECT): ICD-10-CM

## 2019-01-21 DIAGNOSIS — Z87.19 HISTORY OF DIVERTICULITIS: ICD-10-CM

## 2019-01-21 DIAGNOSIS — Z12.11 SCREEN FOR COLON CANCER: ICD-10-CM

## 2019-01-21 DIAGNOSIS — D68.9 COAGULOPATHY (HCC): ICD-10-CM

## 2019-01-21 DIAGNOSIS — Z87.898 HISTORY OF BREAST LUMP: ICD-10-CM

## 2019-01-21 DIAGNOSIS — Z23 NEED FOR VACCINATION: ICD-10-CM

## 2019-01-21 DIAGNOSIS — Z00.00 PREVENTATIVE HEALTH CARE: ICD-10-CM

## 2019-01-21 DIAGNOSIS — R60.0 LOWER EXTREMITY EDEMA: ICD-10-CM

## 2019-01-21 DIAGNOSIS — Z86.718 HISTORY OF RECURRENT DEEP VEIN THROMBOSIS (DVT): ICD-10-CM

## 2019-01-21 DIAGNOSIS — I10 ESSENTIAL HYPERTENSION: ICD-10-CM

## 2019-01-21 DIAGNOSIS — Z85.828 HISTORY OF BASAL CELL CANCER: ICD-10-CM

## 2019-01-21 DIAGNOSIS — J96.11 CHRONIC RESPIRATORY FAILURE WITH HYPOXIA (HCC): ICD-10-CM

## 2019-01-21 DIAGNOSIS — I89.0 CHRONIC ACQUIRED LYMPHEDEMA: ICD-10-CM

## 2019-01-21 DIAGNOSIS — Z12.31 SCREENING MAMMOGRAM, ENCOUNTER FOR: ICD-10-CM

## 2019-01-21 DIAGNOSIS — Z95.828 PRESENCE OF INFERIOR VENA CAVA FILTER: ICD-10-CM

## 2019-01-21 PROBLEM — R73.9 HYPERGLYCEMIA: Status: RESOLVED | Noted: 2018-10-12 | Resolved: 2019-01-21

## 2019-01-21 PROBLEM — R09.02 HYPOXIA: Status: RESOLVED | Noted: 2018-10-14 | Resolved: 2019-01-21

## 2019-01-21 PROBLEM — K57.92 ACUTE DIVERTICULITIS: Status: RESOLVED | Noted: 2018-10-12 | Resolved: 2019-01-21

## 2019-01-21 PROBLEM — E83.51 HYPOCALCEMIA: Status: RESOLVED | Noted: 2018-10-13 | Resolved: 2019-01-21

## 2019-01-21 PROBLEM — E87.6 HYPOKALEMIA: Status: RESOLVED | Noted: 2018-10-13 | Resolved: 2019-01-21

## 2019-01-21 PROBLEM — K62.5 RECTAL BLEEDING: Status: RESOLVED | Noted: 2018-10-12 | Resolved: 2019-01-21

## 2019-01-21 LAB
ALBUMIN SERPL BCP-MCNC: 4 G/DL (ref 3.2–4.9)
ALBUMIN/GLOB SERPL: 1.3 G/DL
ALP SERPL-CCNC: 79 U/L (ref 30–99)
ALT SERPL-CCNC: 9 U/L (ref 2–50)
ANION GAP SERPL CALC-SCNC: 8 MMOL/L (ref 0–11.9)
AST SERPL-CCNC: 12 U/L (ref 12–45)
BASOPHILS # BLD AUTO: 0.8 % (ref 0–1.8)
BASOPHILS # BLD: 0.06 K/UL (ref 0–0.12)
BILIRUB SERPL-MCNC: 0.5 MG/DL (ref 0.1–1.5)
BUN SERPL-MCNC: 10 MG/DL (ref 8–22)
CALCIUM SERPL-MCNC: 9.4 MG/DL (ref 8.5–10.5)
CHLORIDE SERPL-SCNC: 104 MMOL/L (ref 96–112)
CHOLEST SERPL-MCNC: 176 MG/DL (ref 100–199)
CO2 SERPL-SCNC: 26 MMOL/L (ref 20–33)
CREAT SERPL-MCNC: 0.7 MG/DL (ref 0.5–1.4)
EOSINOPHIL # BLD AUTO: 0.27 K/UL (ref 0–0.51)
EOSINOPHIL NFR BLD: 3.4 % (ref 0–6.9)
ERYTHROCYTE [DISTWIDTH] IN BLOOD BY AUTOMATED COUNT: 46.9 FL (ref 35.9–50)
FASTING STATUS PATIENT QL REPORTED: NORMAL
GLOBULIN SER CALC-MCNC: 3.2 G/DL (ref 1.9–3.5)
GLUCOSE SERPL-MCNC: 105 MG/DL (ref 65–99)
HCT VFR BLD AUTO: 39.7 % (ref 37–47)
HDLC SERPL-MCNC: 51 MG/DL
HGB BLD-MCNC: 12.2 G/DL (ref 12–16)
IMM GRANULOCYTES # BLD AUTO: 0.01 K/UL (ref 0–0.11)
IMM GRANULOCYTES NFR BLD AUTO: 0.1 % (ref 0–0.9)
INR PPP: 1.12 (ref 0.87–1.13)
LDLC SERPL CALC-MCNC: 105 MG/DL
LYMPHOCYTES # BLD AUTO: 2.12 K/UL (ref 1–4.8)
LYMPHOCYTES NFR BLD: 27 % (ref 22–41)
MCH RBC QN AUTO: 26.1 PG (ref 27–33)
MCHC RBC AUTO-ENTMCNC: 30.7 G/DL (ref 33.6–35)
MCV RBC AUTO: 85 FL (ref 81.4–97.8)
MONOCYTES # BLD AUTO: 0.85 K/UL (ref 0–0.85)
MONOCYTES NFR BLD AUTO: 10.8 % (ref 0–13.4)
NEUTROPHILS # BLD AUTO: 4.53 K/UL (ref 2–7.15)
NEUTROPHILS NFR BLD: 57.9 % (ref 44–72)
NRBC # BLD AUTO: 0 K/UL
NRBC BLD-RTO: 0 /100 WBC
PLATELET # BLD AUTO: 283 K/UL (ref 164–446)
PMV BLD AUTO: 9.8 FL (ref 9–12.9)
POTASSIUM SERPL-SCNC: 3.7 MMOL/L (ref 3.6–5.5)
PROT SERPL-MCNC: 7.2 G/DL (ref 6–8.2)
PROTHROMBIN TIME: 14.5 SEC (ref 12–14.6)
RBC # BLD AUTO: 4.67 M/UL (ref 4.2–5.4)
SODIUM SERPL-SCNC: 138 MMOL/L (ref 135–145)
TRIGL SERPL-MCNC: 101 MG/DL (ref 0–149)
WBC # BLD AUTO: 7.8 K/UL (ref 4.8–10.8)

## 2019-01-21 PROCEDURE — 99214 OFFICE O/P EST MOD 30 MIN: CPT | Mod: 25 | Performed by: FAMILY MEDICINE

## 2019-01-21 PROCEDURE — 80053 COMPREHEN METABOLIC PANEL: CPT

## 2019-01-21 PROCEDURE — 83036 HEMOGLOBIN GLYCOSYLATED A1C: CPT

## 2019-01-21 PROCEDURE — 36415 COLL VENOUS BLD VENIPUNCTURE: CPT

## 2019-01-21 PROCEDURE — 85610 PROTHROMBIN TIME: CPT

## 2019-01-21 PROCEDURE — G0008 ADMIN INFLUENZA VIRUS VAC: HCPCS | Performed by: FAMILY MEDICINE

## 2019-01-21 PROCEDURE — 85025 COMPLETE CBC W/AUTO DIFF WBC: CPT

## 2019-01-21 PROCEDURE — 90662 IIV NO PRSV INCREASED AG IM: CPT | Performed by: FAMILY MEDICINE

## 2019-01-21 PROCEDURE — 80061 LIPID PANEL: CPT

## 2019-01-21 RX ORDER — LISINOPRIL 20 MG/1
20 TABLET ORAL DAILY
Qty: 30 TAB | Refills: 11 | Status: SHIPPED | OUTPATIENT
Start: 2019-01-21 | End: 2019-03-06

## 2019-01-21 RX ORDER — HYDROCHLOROTHIAZIDE 25 MG/1
25 TABLET ORAL EVERY MORNING
Qty: 30 TAB | Refills: 11 | Status: SHIPPED | OUTPATIENT
Start: 2019-01-21 | End: 2019-03-06

## 2019-01-21 ASSESSMENT — PATIENT HEALTH QUESTIONNAIRE - PHQ9: CLINICAL INTERPRETATION OF PHQ2 SCORE: 0

## 2019-01-21 NOTE — LETTER
[facility]  Sara Mei D.O.  [address]  [place] [address]  Fax: [phone]   Authorization for Release/Disclosure of   Protected Health Information   Name: FIOR FERNANDEZ : 1950 SSN: [id number]   Address: [address] [place] [address]   [place] [address] Phone:    [phone] (home)    I authorize the entity listed below to release/disclose the PHI below to:   [facility]/Sara Mei D.O. and Sara Mei D.O.   Provider or Entity Name:  Dr. Rodriguez    Address   City, State, Zip   Phone:      Fax: [phone]     Reason for request: continuity of care   Information to be released:    [  ] LAST COLONOSCOPY,  including any PATH REPORT and follow-up  [  ] LAST FIT/COLOGUARD RESULT [  ] LAST DEXA  [  ] LAST MAMMOGRAM  [  ] LAST PAP  [  ] LAST LABS [  ] RETINA EXAM REPORT  [  ] IMMUNIZATION RECORDS  [XXX  ] Release all info      [  ] Check here and initial the line next to each item to release ALL health information INCLUDING  _____ Care and treatment for drug and / or alcohol abuse  _____ HIV testing, infection status, or AIDS  _____ Genetic Testing    DATES OF SERVICE OR TIME PERIOD TO BE DISCLOSED: _____________  I understand and acknowledge that:  * This Authorization may be revoked at any time by you in writing, except if your health information has already been used or disclosed.  * Your health information that will be used or disclosed as a result of you signing this authorization could be re-disclosed by the recipient. If this occurs, your re-disclosed health information may no longer be protected by State or Federal laws.  * You may refuse to sign this Authorization. Your refusal will not affect your ability to obtain treatment.  * This Authorization becomes effective upon signing and will  on (date) __________.      If no date is indicated, this Authorization will  one (1) year from the signature date.    Name: Fior Welch  Cory    Signature:   Date:     1/21/2019       PLEASE FAX REQUESTED RECORDS BACK TO: [phone]

## 2019-01-21 NOTE — LETTER
[facility]  Sara Mei D.O.  [address]  [place] [address]  Fax: [phone]   Authorization for Release/Disclosure of   Protected Health Information   Name: FIOR FERNANDEZ : 1950 SSN: [id number]   Address: [address] [place] [address] Phone:    [phone] (home)    I authorize the entity listed below to release/disclose the PHI below to:   [facility]/Sara Mei D.O. and Sara Mei D.O.   Provider or Entity Name:  Yaritza Donaldson DO    Address   City, State, [facility]   Phone: [phone]      Fax:      Reason for request: continuity of care   Information to be released:    [  ] LAST COLONOSCOPY,  including any PATH REPORT and follow-up  [  ] LAST FIT/COLOGUARD RESULT [  ] LAST DEXA  [  ] LAST MAMMOGRAM  [  ] LAST PAP  [  ] LAST LABS [  ] RETINA EXAM REPORT  [  ] IMMUNIZATION RECORDS  [XXX  ] Release all info      [  ] Check here and initial the line next to each item to release ALL health information INCLUDING  _____ Care and treatment for drug and / or alcohol abuse  _____ HIV testing, infection status, or AIDS  _____ Genetic Testing    DATES OF SERVICE OR TIME PERIOD TO BE DISCLOSED: _____________  I understand and acknowledge that:  * This Authorization may be revoked at any time by you in writing, except if your health information has already been used or disclosed.  * Your health information that will be used or disclosed as a result of you signing this authorization could be re-disclosed by the recipient. If this occurs, your re-disclosed health information may no longer be protected by State or Federal laws.  * You may refuse to sign this Authorization. Your refusal will not affect your ability to obtain treatment.  * This Authorization becomes effective upon signing and will  on (date) __________.      If no date is indicated, this Authorization will  one (1) year from the signature date.    Name: Fior  Rebekah Ray    Signature:   Date:     1/21/2019       PLEASE FAX REQUESTED RECORDS BACK TO: [phone]

## 2019-01-21 NOTE — PROGRESS NOTES
"CC: Hypertension    HISTORY OF THE PRESENT ILLNESS: Patient is a 68 y.o. female. This pleasant patient is here today to establish care and discuss health problems as below.    Hypertension: This is well controlled problem for the patient she takes hydrochlorothiazide and lisinopril daily.  She currently has no issues such as headache, chest pain, neurologic symptoms.  She requires refills on these medications today.    History of PE, DVT: Patient notes she has had several DVTs as well as PEs.  She previously was on Coumadin but now has a vena cava filter.  As a result she cannot undergo MRIs.    Chronic bilateral lower extremity edema: This is a chronic issue for the patient.  She is has had vein stripping in the past.  However, she believes her symptoms are worsening.  She denies that her symptoms are like her previous DVTs possible and she has no redness or warmth of her legs.  She would like to reestablish with vascular surgery as she has moved here and does not have any specialist currently.    History of ventricular septal defect: This is a congenital issue for the patient.  She is to follow with cardiology and no longer has one since she has moved here.  She would like referral to cardiology at this time.  She has no current cardiac symptoms.    History of breast lump: Patient notes a history of a breast lump in her left breast which is currently \"marked\".  She states that she is supposed to be having the size monitor.  However, she has not had any mammograms in about 2 years.  She is open to having one today.    Chronic hypoxia, TOMI: Patient states that she has a history of sleep apnea as well as chronic hypoxia.  She does not do a CPAP machine at night.  She has been told that she needs to be on oxygen at night though.  She does not desire to do so at this time.  She may reconsider in the future.    History of basal cell carcinoma: Patient does have a history of basal cell on her nose.  She used to have a " dermatologist where she previously lived and would like to reestablish with one today here.    History of diverticulitis: Patient apparently had a episode of severe diverticulitis where she was put in ICU due to significant gastrointestinal bleeding requiring transfusion.  She is moved as above so does not have follow-up with gastroenterology here and desires referral.  No current issues with abdominal pain or rectal bleeding.    Allergies: Mustard [allyl isothiocyanate] and Prochlorperazine edisylate [compazine]    Current Outpatient Prescriptions Ordered in [facility]   Medication Sig Dispense Refill   • hydroCHLOROthiazide (HYDRODIURIL) 25 MG Tab Take 1 Tab by mouth every morning. 30 Tab 11   • lisinopril (PRINIVIL) 20 MG Tab Take 1 Tab by mouth every day. 30 Tab 11     No current [facility]-ordered facility-administered medications on file.        Past Medical History:   Diagnosis Date   • Diverticulitis    • Hypertension    • Obesity    • TOMI (obstructive sleep apnea)    • PE (pulmonary thromboembolism) (HCC)    • VSD (ventricular septal defect and aortic arch hypoplasia        Past Surgical History:   Procedure Laterality Date   • KNEE ARTHROPLASTY TOTAL Right 2014   • HYSTERECTOMY, VAGINAL     • VEIN STRIPPING         Social History   Substance Use Topics   • Smoking status: Never Smoker   • Smokeless tobacco: Never Used   • Alcohol use No       Social History     Social History Narrative   • No narrative on file       Family History   Problem Relation Age of Onset   • Lung Disease Mother         COPD    • Heart Attack Father    • No Known Problems Sister    • No Known Problems Brother    • No Known Problems Maternal Grandmother    • No Known Problems Maternal Grandfather    • No Known Problems Paternal Grandmother    • Heart Attack Paternal Grandfather        ROS:     - Constitutional: Negative for fever, chills, unexpected weight change, and fatigue/generalized weakness.     - HEENT: Negative for headaches, vision  "changes, hearing changes, ear pain, ear discharge, rhinorrhea, sinus congestion, sore throat, and neck pain.      - Respiratory: Negative for cough, sputum production, chest congestion, dyspnea, wheezing, and crackles.      - Cardiovascular: Positive for severe bilateral venous stasis.  Negative for chest pain, palpitations, orthopnea, PND.    - Gastrointestinal: Negative for heartburn, nausea, vomiting, abdominal pain, hematochezia, melena, diarrhea, constipation, and greasy/foul-smelling stools.     - Genitourinary: Negative for dysuria, polyuria, hematuria, pyuria, urinary urgency, and urinary incontinence.     - Musculoskeletal: Negative for myalgias, back pain, and joint pain.     - Skin: Positive for chronic changes of skin due to venous stasis of lower extremities.  Negative for rash, itching, cyanotic skin color change.     - Neurological: Negative for dizziness, tingling, tremors, focal sensory deficit, focal weakness and headaches.     - Endo/Heme/Allergies: Does not bruise/bleed easily. No polyuria or polydipsia.    Exam: Blood pressure 132/78, pulse 80, temperature 37.2 °C (98.9 °F), temperature source Temporal, height 1.626 m (5' 4\"), weight (!) 128.8 kg (284 lb), SpO2 93 %, not currently breastfeeding. Body mass index is 48.75 kg/m².    General: Well appearing, NAD  HEENT: Normocephalic. Conjunctiva clear, lids without ptosis, pupils equal and reactive to light accommodation, ears normal shape and contour, oropharynx is without erythema, edema or exudates.   Neck: Supple without JVD. No thyromegaly or nodules  Pulmonary: Clear to ausculation.  Normal effort. No rales, ronchi, or wheezing.  Cardiovascular: Regular rate and rhythm without murmur, rubs or gallop.  Dense bilateral lower extremity edema right greater than left with chronic venous stasis changes.  No obvious palpable cords, redness or warmth.  Abdomen: Soft, nontender, nondistended. Normal bowel sounds. Liver and spleen are not palpable. No " rebound or guarding  Neurologic:  normal gait  Lymph: No cervical, supraclavicular lymph nodes are palpable  Skin: Chronic venous stasis changes of lower extremities bilaterally.  Musculoskeletal:  No extremity cyanosis, clubbing, or edema.  Psych: Normal mood and affect. Alert and oriented. Judgment and insight is normal.    Please note that this dictation was created using voice recognition software. I have made every reasonable attempt to correct obvious errors, but I expect that there are errors of grammar and possibly content that I did not discover before finalizing the note.      Assessment/Plan  Diagnoses and all orders for this visit:    History of diverticulitis  Screen for colon cancer  Patient has history of diverticulitis and needs to follow-up with gastroenterology here as she has moved.  Referral placed today.  -     REFERRAL TO GASTROENTEROLOGY    Preventative health care  -     CBC WITH DIFFERENTIAL; Future  -     COMP METABOLIC PANEL; Future  -     HEMOGLOBIN A1C; Future  -     Lipid Profile; Future    Screening mammogram, encounter for  History of breast lump  Patient has history of breast lump which is supposed to be monitored but she has not had any monitoring for about 2 years.  Mammogram ordered today.  -     MA-SCREENING MAMMO BILAT W/TOMOSYNTHESIS W/CAD; Future    History of recurrent deep vein thrombosis (DVT)  Presence of inferior vena cava filter  These are chronic well-controlled problems for the patient.  She has a vena cava filter in place.  Likely contraindicated to continue anticoagulation due to recent severe gastrointestinal bleeding related to diverticulitis.  We will continue to monitor.    Chronic respiratory failure with hypoxia (HCC)  This is a chronic uncontrolled problem for the patient.  Due to extent of complaints during today's visit, did not thoroughly address this issue today.  Would like to do walking pulse oximetry at night next visit.    History of basal cell  cancer  This is a resolved problem for the patient.  She needs regular skin checks so has been referred to dermatology.  -     REFERRAL TO DERMATOLOGY    Essential hypertension  This is a chronic well-controlled problem for the patient.  Her medications were refilled today.  CMP checked today as well.  -     hydroCHLOROthiazide (HYDRODIURIL) 25 MG Tab; Take 1 Tab by mouth every morning.  -     lisinopril (PRINIVIL) 20 MG Tab; Take 1 Tab by mouth every day.    Need for vaccination  -     INFLUENZA VACCINE, HIGH DOSE (65+ ONLY)    Coagulopathy (HCC)  This is chronic issue for the patient.  Check INR.  -     Prothrombin Time; Future    VSD (ventricular septal defect)  This is a chronic issue for the patient.  No current cardiac symptoms.  Referred to cardiology at this time she would like to establish care with one here in [place].  -     REFERRAL TO CARDIOLOGY    Chronic acquired lymphedema  Lower extremity edema  This is a new uncontrolled and worsening problem for the patient.  Doubt DVT based on exam but suspect worsening lymphedema.  She has had vein stripping in the past.  I gone ahead and referred her back to vascular surgery for further evaluation.  -     REFERRAL TO VASCULAR SURGERY    Follow-up in 2 weeks for lab review and further health maintenance catch up.    Sara Mei,   [facility]

## 2019-01-21 NOTE — PROGRESS NOTES
Subjective:      Fior Ray is a 68 y.o. female who presents with No chief complaint on file.            HPI    ROS       Objective:     There were no vitals taken for this visit.     Physical Exam            Assessment/Plan:     1. History of diverticulitis  ***    2. Preventative health care  ***    3. Screening mammogram, encounter for  ***

## 2019-01-22 LAB
EST. AVERAGE GLUCOSE BLD GHB EST-MCNC: 128 MG/DL
HBA1C MFR BLD: 6.1 % (ref 0–5.6)

## 2019-01-23 ENCOUNTER — HOSPITAL ENCOUNTER (OUTPATIENT)
Dept: RADIOLOGY | Facility: MEDICAL CENTER | Age: 69
End: 2019-01-23
Attending: FAMILY MEDICINE
Payer: MEDICARE

## 2019-01-23 ENCOUNTER — TELEPHONE (OUTPATIENT)
Dept: MEDICAL GROUP | Facility: PHYSICIAN GROUP | Age: 69
End: 2019-01-23

## 2019-01-23 DIAGNOSIS — Z12.31 SCREENING MAMMOGRAM, ENCOUNTER FOR: ICD-10-CM

## 2019-01-23 DIAGNOSIS — Z87.898 HISTORY OF BREAST LUMP: ICD-10-CM

## 2019-01-23 PROBLEM — R73.03 PREDIABETES: Status: ACTIVE | Noted: 2019-01-23

## 2019-01-23 PROCEDURE — 77063 BREAST TOMOSYNTHESIS BI: CPT

## 2019-01-23 NOTE — LETTER
January 23, 2019        Fior Ray  [address]  [place] [address]        Dear Fior:    1.  Normal INR.   2.  Elevated hemoglobin A1c which is a 3-month average of sugars.  She is in the prediabetes range but does not need to be on a medication at this time.  We will just keep an eye on this number about every 6 months.  I recommend she increase physical activity and cut back on sugars, sweets, carbohydrates.   3.  Very mildly elevated cholesterol.  Again no need to be on a medication at this time but just cutting back on saturated fats and red meats with likely be helpful.       Otherwise labs were normal    If you have any questions or concerns, please don't hesitate to call.        Sincerely,        Sara Mei D.O.    Electronically Signed

## 2019-01-23 NOTE — TELEPHONE ENCOUNTER
----- Message from Sara Mei D.O. sent at 2019  8:16 AM PST -----  Please call patient let her know that her results were significant for the followin.  Normal INR.  2.  Elevated hemoglobin A1c which is a 3-month average of sugars.  She is in the prediabetes range but does not need to be on a medication at this time.  We will just keep an eye on this number about every 6 months.  I recommend she increase physical activity and cut back on sugars, sweets, carbohydrates.  3.  Very mildly elevated cholesterol.  Again no need to be on a medication at this time but just cutting back on saturated fats and red meats with likely be helpful.      Otherwise labs were normal.  Let me know if she has any questions.

## 2019-01-29 NOTE — PROGRESS NOTES
Outcome: Left Message    Please transfer to Patient Outreach Team at [phone] when patient returns call.    WebIZ Checked & Epic Updated:  yes    HealthConnect Verified: yes    Attempt # 2

## 2019-01-31 ENCOUNTER — TELEPHONE (OUTPATIENT)
Dept: MEDICAL GROUP | Facility: PHYSICIAN GROUP | Age: 69
End: 2019-01-31

## 2019-01-31 DIAGNOSIS — R92.8 ABNORMAL MAMMOGRAM OF RIGHT BREAST: ICD-10-CM

## 2019-01-31 NOTE — PROGRESS NOTES
Outcome: Left Message    Please transfer to Patient Outreach Team at [phone] when patient returns call.    Attempt # 3

## 2019-01-31 NOTE — TELEPHONE ENCOUNTER
----- Message from Sara Mei D.O. sent at 1/31/2019  1:59 PM PST -----  Please call patient let her know that there were some calcifications noted on her mammogram.  These do not necessarily mean anything bad and we see them all the time.  It is recommended that we follow-up with a more detailed mammogram.  Please let me know if she has any questions.

## 2019-02-05 ENCOUNTER — HOSPITAL ENCOUNTER (OUTPATIENT)
Dept: RADIOLOGY | Facility: MEDICAL CENTER | Age: 69
End: 2019-02-05

## 2019-02-05 ENCOUNTER — HOSPITAL ENCOUNTER (OUTPATIENT)
Dept: RADIOLOGY | Facility: MEDICAL CENTER | Age: 69
End: 2019-02-05
Attending: FAMILY MEDICINE
Payer: MEDICARE

## 2019-02-05 DIAGNOSIS — R92.8 ABNORMAL MAMMOGRAM OF RIGHT BREAST: ICD-10-CM

## 2019-02-05 PROCEDURE — 77065 DX MAMMO INCL CAD UNI: CPT | Mod: RT

## 2019-02-14 ENCOUNTER — TELEPHONE (OUTPATIENT)
Dept: MEDICAL GROUP | Facility: PHYSICIAN GROUP | Age: 69
End: 2019-02-14

## 2019-02-14 NOTE — LETTER
[facility]  Sara Mei D.O.  [address]  [place] [address]  Fax: [phone]   Authorization for Release/Disclosure of   Protected Health Information   Name: FIOR FERNANDEZ : 1950 SSN: [id number]   Address: [address] [place] [address]   [facility] [address] Phone:    [phone] (home)    I authorize the entity listed below to release/disclose the PHI below to:   [facility]/Sara Mei D.O. and Sara Mei D.O.   Provider or Entity Name:[facility]   City, State, Zip   Phone:      Fax:[phone]     Reason for request: continuity of care   Information to be released:    [  ] LAST COLONOSCOPY,  including any PATH REPORT and follow-up  [  ] LAST FIT/COLOGUARD RESULT [  ] LAST DEXA  [  ] LAST MAMMOGRAM  [  ] LAST PAP  [  ] LAST LABS [  ] RETINA EXAM REPORT  [  ] IMMUNIZATION RECORDS  [ XX ] Release all info      [  ] Check here and initial the line next to each item to release ALL health information INCLUDING  _____ Care and treatment for drug and / or alcohol abuse  _____ HIV testing, infection status, or AIDS  _____ Genetic Testing    DATES OF SERVICE OR TIME PERIOD TO BE DISCLOSED: _____________  I understand and acknowledge that:  * This Authorization may be revoked at any time by you in writing, except if your health information has already been used or disclosed.  * Your health information that will be used or disclosed as a result of you signing this authorization could be re-disclosed by the recipient. If this occurs, your re-disclosed health information may no longer be protected by State or Federal laws.  * You may refuse to sign this Authorization. Your refusal will not affect your ability to obtain treatment.  * This Authorization becomes effective upon signing and will  on (date) __________.      If no date is indicated, this Authorization will  one (1) year from the signature date.    Name: Fior Welch  Cory    Signature: continuity of care    Date:     2/14/2019       PLEASE FAX REQUESTED RECORDS BACK TO: [phone]

## 2019-02-14 NOTE — TELEPHONE ENCOUNTER
Future Appointments       Provider Department Center    2/15/2019 7:50 AM Sara Mei D.O. [facility]    3/6/2019 3:45 PM Adiel Collado M.D. [facility]         ESTABLISHED PATIENT PRE-VISIT PLANNING     Patient was NOT contacted to complete PVP.     Note: Patient will not be contacted if there is no indication to call.     1.  Reviewed notes from the last few office visits within the medical group: Yes    2.  If any orders were placed at last visit or intended to be done for this visit (i.e. 6 mos follow-up), do we have Results/Consult Notes?        •  Labs - Labs ordered, completed on 01/21/2019 and results are in chart.       •  Imaging - Imaging ordered, completed and results are in chart.       •  Referrals - Referral ordered, patient has NOT been seen. Pt. Has yet to be seen with GI consultants, Skin cancer and Dermatology, and cardiology     3. Is this appointment scheduled as a Hospital Follow-Up? No    4.  Immunizations were updated in [employer] using WebIZ?: Yes       •  Web Iz Recommendations: PREVNAR (PCV13) , TDAP and SHINGRIX (Shingles)    5.  Patient is due for the following Health Maintenance Topics:   Health Maintenance Due   Topic Date Due   • Annual Wellness Visit  1950   • IMM DTaP/Tdap/Td Vaccine (1 - Tdap) 03/23/1969   • COLONOSCOPY  03/23/2000   • IMM ZOSTER VACCINES (1 of 2) 03/23/2000   • BONE DENSITY  03/23/2015   • IMM PNEUMOCOCCAL (1 of 2 - PCV13) 03/23/2015       6. Orders for overdue Health Maintenance topics pended in Pre-Charting? YES    7.  AHA (MDX) form printed for Provider? YES    8.  Patient was informed to arrive 15 min prior to their scheduled appointment and bring in their medication bottles. Confirmed through automated call

## 2019-02-15 ENCOUNTER — APPOINTMENT (OUTPATIENT)
Dept: MEDICAL GROUP | Facility: PHYSICIAN GROUP | Age: 69
End: 2019-02-15
Payer: MEDICARE

## 2019-02-25 ENCOUNTER — TELEPHONE (OUTPATIENT)
Dept: MEDICAL GROUP | Facility: PHYSICIAN GROUP | Age: 69
End: 2019-02-25

## 2019-02-25 NOTE — TELEPHONE ENCOUNTER
Future Appointments       Provider Department Center    2/26/2019 1:10 PM Sara Mei D.O. [facility]    3/6/2019 3:45 PM Adiel Collado M.D. [facility]     3/26/2019 4:30 PM Lea Perez, PT [facility] [address]        ESTABLISHED PATIENT PRE-VISIT PLANNING     Patient was contacted to complete PVP.     Note: Patient will not be contacted if there is no indication to call.     1.  Reviewed notes from the last few office visits within the medical group: Yes    2.  If any orders were placed at last visit or intended to be done for this visit (i.e. 6 mos follow-up), do we have Results/Consult Notes?        •  Labs - Labs ordered, completed on 01/21/2019 and results are in chart.       •  Imaging - Imaging ordered, completed and results are in chart.       •  Referrals - Referral ordered, patient has NOT been seen. Derm and GI have called patient with no return phone calls to schedule     3. Is this appointment scheduled as a Hospital Follow-Up? No    4.  Immunizations were updated in [employer] using WebIZ?: Yes       •  Web Iz Recommendations: PREVNAR (PCV13) , TDAP and SHINGRIX (Shingles)    5.  Patient is due for the following Health Maintenance Topics:   Health Maintenance Due   Topic Date Due   • Annual Wellness Visit  1950   • IMM DTaP/Tdap/Td Vaccine (1 - Tdap) 03/23/1969   • COLONOSCOPY  03/23/2000   • IMM ZOSTER VACCINES (1 of 2) 03/23/2000   • BONE DENSITY  03/23/2015   • IMM PNEUMOCOCCAL (1 of 2 - PCV13) 03/23/2015     6. Orders for overdue Health Maintenance topics pended in Pre-Charting? YES    7.  AHA (MDX) form printed for Provider? YES    8.  Patient was informed to arrive 15 min prior to their scheduled appointment and bring in their medication bottles. ANGELINA

## 2019-02-26 ENCOUNTER — OFFICE VISIT (OUTPATIENT)
Dept: MEDICAL GROUP | Facility: PHYSICIAN GROUP | Age: 69
End: 2019-02-26
Payer: MEDICARE

## 2019-02-26 VITALS
WEIGHT: 283 LBS | HEIGHT: 64 IN | TEMPERATURE: 98.8 F | DIASTOLIC BLOOD PRESSURE: 70 MMHG | HEART RATE: 74 BPM | OXYGEN SATURATION: 92 % | SYSTOLIC BLOOD PRESSURE: 126 MMHG | BODY MASS INDEX: 48.32 KG/M2

## 2019-02-26 DIAGNOSIS — Z86.718 HISTORY OF RECURRENT DEEP VEIN THROMBOSIS (DVT): ICD-10-CM

## 2019-02-26 DIAGNOSIS — Z86.711 HISTORY OF PULMONARY EMBOLISM: ICD-10-CM

## 2019-02-26 DIAGNOSIS — Z95.828 PRESENCE OF INFERIOR VENA CAVA FILTER: ICD-10-CM

## 2019-02-26 DIAGNOSIS — Z78.0 POSTMENOPAUSAL: ICD-10-CM

## 2019-02-26 DIAGNOSIS — J96.11 CHRONIC RESPIRATORY FAILURE WITH HYPOXIA (HCC): ICD-10-CM

## 2019-02-26 DIAGNOSIS — D68.9 COAGULOPATHY (HCC): ICD-10-CM

## 2019-02-26 DIAGNOSIS — R05.9 COUGH: ICD-10-CM

## 2019-02-26 PROCEDURE — 99214 OFFICE O/P EST MOD 30 MIN: CPT | Performed by: FAMILY MEDICINE

## 2019-02-26 NOTE — PROGRESS NOTES
CC: Cough    HISTORY OF THE PRESENT ILLNESS: Patient is a 68 y.o. female. This pleasant patient is here today to follow-up on labs, mammogram and for cough.    Mammogram and lab follow-up: Patient here to discuss labs and mammogram results.  She did have an abnormal mammogram of her right breast which showed some calcifications.  Follow-up mammogram showed continued calcifications but not concerning for malignancy.  She will be due for repeat mammogram in 6 months to monitor calcifications.  Patient in agreement with plan.    Cough: This is a new issue for the patient in the past 3 or so days.  Cough is dry in nature.  She has absolutely no other symptoms such as fevers, chills, cold symptoms, congestion, body aches, worsening leg swelling, shortness of breath, wheezing.  She has no PND orthopnea.  She has no pleuritic chest pain.  She states she feels quite well other than just having a cough intermittently.  The cough is not that are worse at specific times of the day.  She has been taking some cough drops which is been helping a little bit.    Chronic hypoxia: As per last visit, patient does have sleep apnea and has been told that she needs to wear oxygen at night.  At this time she is not interested in doing so.    Recurrent DVT, PE: Chronic issues for the patient.  Previous to October of this year when she suffered a gastrointestinal bleed due 2 diverticula, she was on Coumadin as well as having an IV IVC filter.  Since that time she has been off of the Coumadin and has an IVC filter only.  She does follow with cardiology soon.  She has not had any follow-up with gastroenterology.  She is unsure whether or not she is supposed to be back on the Coumadin.    Allergies: Mustard [allyl isothiocyanate] and Prochlorperazine edisylate [compazine]    Current Outpatient Prescriptions Ordered in [facility]   Medication Sig Dispense Refill   • hydroCHLOROthiazide (HYDRODIURIL) 25 MG Tab Take 1 Tab by mouth every morning. 30 Tab  11   • lisinopril (PRINIVIL) 20 MG Tab Take 1 Tab by mouth every day. 30 Tab 11     No current [facility]-ordered facility-administered medications on file.        Past Medical History:   Diagnosis Date   • Diverticulitis    • Hypertension    • Obesity    • TOMI (obstructive sleep apnea)    • PE (pulmonary thromboembolism) ([facility])    • VSD (ventricular septal defect and aortic arch hypoplasia        Past Surgical History:   Procedure Laterality Date   • KNEE ARTHROPLASTY TOTAL Right 2014   • HYSTERECTOMY, VAGINAL     • VEIN STRIPPING         Social History   Substance Use Topics   • Smoking status: Never Smoker   • Smokeless tobacco: Never Used   • Alcohol use No       Social History     Social History Narrative   • No narrative on file       Family History   Problem Relation Age of Onset   • Lung Disease Mother         COPD    • Heart Attack Father    • No Known Problems Sister    • No Known Problems Brother    • No Known Problems Maternal Grandmother    • No Known Problems Maternal Grandfather    • No Known Problems Paternal Grandmother    • Heart Attack Paternal Grandfather        ROS:     - Constitutional: Negative for fever, chills, unexpected weight change, and fatigue/generalized weakness.     - HEENT: Negative for headaches, vision changes, hearing changes, ear pain, ear discharge, rhinorrhea, sinus congestion, sore throat, and neck pain.      - Respiratory: Positive for cough.  Negative for sputum production, chest congestion, dyspnea, wheezing, and crackles.      - Cardiovascular: Negative for chest pain, palpitations, orthopnea, PND, and bilateral lower extremity edema.     - Gastrointestinal: Negative for heartburn, nausea, vomiting, abdominal pain, hematochezia, melena, diarrhea, constipation, and greasy/foul-smelling stools.     - Musculoskeletal: Negative for myalgias, back pain, and joint pain.     - Skin: Negative for rash, itching, cyanotic skin color change.     Exam: Blood pressure 126/70, pulse 74,  "temperature 37.1 °C (98.8 °F), temperature source Temporal, height 1.626 m (5' 4\"), weight (!) 128.4 kg (283 lb), SpO2 92 %, not currently breastfeeding. Body mass index is 48.58 kg/m².    General: Well appearing, NAD  HEENT: Normocephalic. Conjunctiva clear, lids without ptosis, pupils equal and reactive to light accommodation, ears normal shape and contour, canals are clear bilaterally, tympanic membranes without bulging or erythema and normal cone of light, nasal mucosa normal, oropharynx is without erythema, edema or exudates.   Neck: Supple without JVD.   Pulmonary: Clear to ausculation.  Normal effort. No rales, ronchi, or wheezing.  Dense pitting lymphedema both lower extremities bilaterally which is stable.  Cardiovascular: Regular rate and rhythm without murmur, rubs or gallop.   Skin: Warm and dry.  No obvious lesions.  Psych: Normal mood and affect. Alert and oriented. Judgment and insight is normal.      Please note that this dictation was created using voice recognition software. I have made every reasonable attempt to correct obvious errors, but I expect that there are errors of grammar and possibly content that I did not discover before finalizing the note.      Assessment/Plan  Fior was seen today for cough.    Diagnoses and all orders for this visit:    Postmenopausal  -     DS-BONE DENSITY STUDY (DEXA); Future    Chronic respiratory failure with hypoxia (HCC)  Chronic uncontrolled issue for the patient due to likely sleep apnea at night.  She has been told she needs to wear oxygen at night but is unwilling to do so at this time.    Coagulopathy (HCC)  Presence of inferior vena cava filter  History of recurrent deep vein thrombosis (DVT)  History of pulmonary embolism  These are chronic issues for the patient.  She has had recurrent DVT as well as PE in the past.  She does have an IVC filter in place.  Unclear whether or not she should have resumed Coumadin after her hospitalization for " gastrointestinal bleed in October.  She does have follow-up with cardiology soon and I have asked her to touch base with them about whether or not she should go back on the Coumadin.  Additionally, she has been referred to gastroenterology for follow-up and colonoscopy.  She should be touching base with them as well about whether or not safe to resume Coumadin.  Given her recurrent issues with coagulopathy, suspect it would be better to use Coumadin in addition to an IVC filter.    Cough  New but very minor issue for the patient.  She has no associated symptoms.  Unclear cause at this time but could be related to mild viral URI or allergies.  Could also consider lisinopril as a cause though she is been on this medication for a long time.  No GERD symptoms.  No symptoms of DVT or PE.  We will go ahead and continue to monitor this for the next couple of days.  She will let me know if her cough is not resolving in a week or so or if she develops any new or worsening symptoms.     Follow-up in 3 months or sooner if needed.    Sara Mei,   [facility]

## 2019-03-01 ENCOUNTER — TELEPHONE (OUTPATIENT)
Dept: CARDIOLOGY | Facility: MEDICAL CENTER | Age: 69
End: 2019-03-01

## 2019-03-06 ENCOUNTER — OFFICE VISIT (OUTPATIENT)
Dept: CARDIOLOGY | Facility: MEDICAL CENTER | Age: 69
End: 2019-03-06
Payer: MEDICARE

## 2019-03-06 VITALS
SYSTOLIC BLOOD PRESSURE: 148 MMHG | HEART RATE: 76 BPM | BODY MASS INDEX: 48.32 KG/M2 | DIASTOLIC BLOOD PRESSURE: 84 MMHG | WEIGHT: 283 LBS | OXYGEN SATURATION: 93 % | HEIGHT: 64 IN

## 2019-03-06 DIAGNOSIS — Q21.0 VSD (VENTRICULAR SEPTAL DEFECT): ICD-10-CM

## 2019-03-06 DIAGNOSIS — I87.2 VENOUS INSUFFICIENCY OF BOTH LOWER EXTREMITIES: Chronic | ICD-10-CM

## 2019-03-06 DIAGNOSIS — E66.01 MORBID OBESITY (HCC): Chronic | ICD-10-CM

## 2019-03-06 PROCEDURE — 93000 ELECTROCARDIOGRAM COMPLETE: CPT | Performed by: INTERNAL MEDICINE

## 2019-03-06 PROCEDURE — 99204 OFFICE O/P NEW MOD 45 MIN: CPT | Performed by: INTERNAL MEDICINE

## 2019-03-06 RX ORDER — LISINOPRIL AND HYDROCHLOROTHIAZIDE 25; 20 MG/1; MG/1
1 TABLET ORAL DAILY
Qty: 90 TAB | Refills: 3 | Status: SHIPPED | OUTPATIENT
Start: 2019-03-06 | End: 2019-03-07 | Stop reason: SDUPTHER

## 2019-03-06 ASSESSMENT — ENCOUNTER SYMPTOMS
BRUISES/BLEEDS EASILY: 0
SHORTNESS OF BREATH: 0
CLAUDICATION: 0
DIZZINESS: 0
PALPITATIONS: 0
FALLS: 0
COUGH: 0
NAUSEA: 0
SORE THROAT: 0
WEAKNESS: 0
CHILLS: 0
FOCAL WEAKNESS: 0
ORTHOPNEA: 1
BLURRED VISION: 0
PND: 0
FEVER: 0
ABDOMINAL PAIN: 0

## 2019-03-06 NOTE — LETTER
[facility]   [address] [place] [address]  [place] [address]  Phone: [phone]  Fax: [phone]              Fior Ray  1950    Encounter Date: 3/6/2019    Adiel Collado M.D.          PROGRESS NOTE:  Chief Complaint   Patient presents with   • Ventricular Septal Defect       Subjective:   Fior Ray is a 68 y.o. female who presents today in consultation from Dr. Mei for evaluation of her history of VSD which he has known about from childhood she had [place] [facility] [place] and then subsequently had moved [facility] and follow with cardiology there is been no apparent consequence of her long-term VSD she does struggled long-term with morbid obesity since childhood and has developed venous insufficiency and lymphedema and establish with vein clinic.  She is also had long-standing hypertension which is well controlled on minimal medicines.  She had a pulmonary embolism after knee surgery which was quite complicated eventually getting a IVC filter with history of significant GI bleed last October on anticoagulation    She also has sleep apnea but was unable to wear CPAP due to claustrophobia    Past Medical History:   Diagnosis Date   • Diverticulitis    • Hypertension    • Morbid obesity (HCC)    • Obesity    • TOMI (obstructive sleep apnea)    • PE (pulmonary thromboembolism) (HCC)    • Venous insufficiency of both lower extremities    • VSD (ventricular septal defect and aortic arch hypoplasia      Past Surgical History:   Procedure Laterality Date   • KNEE ARTHROPLASTY TOTAL Right 2014   • HYSTERECTOMY, VAGINAL     • VEIN STRIPPING       Family History   Problem Relation Age of Onset   • Lung Disease Mother         COPD    • Heart Attack Father    • No Known Problems Sister    • No Known Problems Brother    • No Known Problems Maternal Grandmother    • No Known  "Problems Maternal Grandfather    • No Known Problems Paternal Grandmother    • Heart Attack Paternal Grandfather      Social History     Social History   • Marital status:      Spouse name: N/A   • Number of children: N/A   • Years of education: N/A     Occupational History   • Not on file.     Social History Main Topics   • Smoking status: Never Smoker   • Smokeless tobacco: Never Used   • Alcohol use No   • Drug use: No   • Sexual activity: Not on file     Other Topics Concern   • Not on file     Social History Narrative   • No narrative on file     Allergies   Allergen Reactions   • Mustard [Allyl Isothiocyanate] Anaphylaxis   • Prochlorperazine Edisylate [Compazine] Anaphylaxis     Outpatient Encounter Prescriptions as of 3/6/2019   Medication Sig Dispense Refill   • lisinopril-hydrochlorothiazide (PRINZIDE, ZESTORETIC) 20-25 MG per tablet Take 1 Tab by mouth every day. 90 Tab 3   • [DISCONTINUED] hydroCHLOROthiazide (HYDRODIURIL) 25 MG Tab Take 1 Tab by mouth every morning. 30 Tab 11   • [DISCONTINUED] lisinopril (PRINIVIL) 20 MG Tab Take 1 Tab by mouth every day. 30 Tab 11     No facility-administered encounter medications on file as of 3/6/2019.      Review of Systems   Constitutional: Negative for chills and fever.   HENT: Negative for sore throat.    Eyes: Negative for blurred vision.   Respiratory: Negative for cough and shortness of breath.    Cardiovascular: Positive for orthopnea and leg swelling. Negative for chest pain, palpitations, claudication and PND.   Gastrointestinal: Negative for abdominal pain and nausea.   Genitourinary: Positive for frequency.   Musculoskeletal: Negative for falls and joint pain.   Skin: Negative for rash.   Neurological: Negative for dizziness, focal weakness and weakness.   Endo/Heme/Allergies: Does not bruise/bleed easily.        Objective:   /84 (BP Location: Left arm, Patient Position: Sitting, BP Cuff Size: Adult)   Pulse 76   Ht 1.626 m (5' 4\")   Wt " (!) 128.4 kg (283 lb)   SpO2 93%   BMI 48.58 kg/m²      Physical Exam   Constitutional: No distress.   HENT:   Mouth/Throat: Oropharynx is clear and moist. No oropharyngeal exudate.   Eyes: No scleral icterus.   Neck: No JVD present.   Cardiovascular: Normal rate and normal heart sounds.  Exam reveals no gallop and no friction rub.    No murmur heard.  Pulmonary/Chest: No respiratory distress. She has no wheezes. She has no rales.   Abdominal: Soft. Bowel sounds are normal.   Musculoskeletal: She exhibits edema (Venous insufficiency and lymphedema with obesity).   Neurological: She is alert.   Skin: No rash noted. She is not diaphoretic.   Psychiatric: She has a normal mood and affect.     We reviewed in person the recent labs  Recent Results (from the past 4032 hour(s))   CBC WITH DIFFERENTIAL    Collection Time: 10/12/18  7:52 AM   Result Value Ref Range    WBC 7.7 4.8 - 10.8 K/uL    RBC 4.16 (L) 4.20 - 5.40 M/uL    Hemoglobin 12.3 12.0 - 16.0 g/dL    Hematocrit 39.2 37.0 - 47.0 %    MCV 94.2 81.4 - 97.8 fL    MCH 29.6 27.0 - 33.0 pg    MCHC 31.4 (L) 33.6 - 35.0 g/dL    RDW 48.9 35.9 - 50.0 fL    Platelet Count 270 164 - 446 K/uL    MPV 9.3 9.0 - 12.9 fL    Neutrophils-Polys 61.30 44.00 - 72.00 %    Lymphocytes 26.70 22.00 - 41.00 %    Monocytes 7.80 0.00 - 13.40 %    Eosinophils 3.20 0.00 - 6.90 %    Basophils 0.60 0.00 - 1.80 %    Immature Granulocytes 0.40 0.00 - 0.90 %    Nucleated RBC 0.00 /100 WBC    Neutrophils (Absolute) 4.73 2.00 - 7.15 K/uL    Lymphs (Absolute) 2.06 1.00 - 4.80 K/uL    Monos (Absolute) 0.60 0.00 - 0.85 K/uL    Eos (Absolute) 0.25 0.00 - 0.51 K/uL    Baso (Absolute) 0.05 0.00 - 0.12 K/uL    Immature Granulocytes (abs) 0.03 0.00 - 0.11 K/uL    NRBC (Absolute) 0.00 K/uL   COMP METABOLIC PANEL    Collection Time: 10/12/18  7:52 AM   Result Value Ref Range    Sodium 141 135 - 145 mmol/L    Potassium 3.7 3.6 - 5.5 mmol/L    Chloride 106 96 - 112 mmol/L    Co2 25 20 - 33 mmol/L    Anion Gap  10.0 0.0 - 11.9    Glucose 114 (H) 65 - 99 mg/dL    Bun 13 8 - 22 mg/dL    Creatinine 0.66 0.50 - 1.40 mg/dL    Calcium 9.2 8.5 - 10.5 mg/dL    AST(SGOT) 15 12 - 45 U/L    ALT(SGPT) 14 2 - 50 U/L    Alkaline Phosphatase 80 30 - 99 U/L    Total Bilirubin 0.4 0.1 - 1.5 mg/dL    Albumin 3.8 3.2 - 4.9 g/dL    Total Protein 7.2 6.0 - 8.2 g/dL    Globulin 3.4 1.9 - 3.5 g/dL    A-G Ratio 1.1 g/dL   PROTHROMBIN TIME (INR)    Collection Time: 10/12/18  7:52 AM   Result Value Ref Range    PT 31.9 (H) 12.0 - 14.6 sec    INR 3.09 (H) 0.87 - 1.13   APTT    Collection Time: 10/12/18  7:52 AM   Result Value Ref Range    APTT 45.1 (H) 24.7 - 36.0 sec   COD (ADULT)    Collection Time: 10/12/18  7:52 AM   Result Value Ref Range    ABO Grouping Only A     Rh Grouping Only POS     Antibody Screen-Cod NEG     Component R       R3                  Red Blood Cells3    [id number]   transfused   10/12/18   09:58      Product Type Red Blood Cells LR Pheresis     Dispense Status Transfused     Unit Number (Barcoded) [id number]     Product Code (Barcoded) G4500B35     Blood Type (Barcoded) 6200     Component R       R3                  Red Blood Cells3    [id number]   transfused   10/12/18   10:44      Product Type Red Blood Cells LR Pheresis     Dispense Status Transfused     Unit Number (Barcoded) [id number]     Product Code (Barcoded) G6324D30     Blood Type (Barcoded) 6200    ESTIMATED GFR    Collection Time: 10/12/18  7:52 AM   Result Value Ref Range    GFR If African American >60 >60 mL/min/1.73 m 2    GFR If Non African American >60 >60 mL/min/1.73 m 2   ABO AND RH CONFIRMATION    Collection Time: 10/12/18  8:41 AM   Result Value Ref Range    ABO Confirm A     Second Rh Group POS    LACTIC ACID    Collection Time: 10/12/18  8:41 AM   Result Value Ref Range    Lactic Acid 1.8 0.5 - 2.0 mmol/L   CBC WITHOUT DIFFERENTIAL    Collection Time: 10/12/18  9:52 AM   Result Value Ref Range    WBC 8.2 4.8 - 10.8 K/uL    RBC 3.30 (L)  4.20 - 5.40 M/uL    Hemoglobin 10.1 (L) 12.0 - 16.0 g/dL    Hematocrit 31.2 (L) 37.0 - 47.0 %    MCV 94.5 81.4 - 97.8 fL    MCH 30.6 27.0 - 33.0 pg    MCHC 32.4 (L) 33.6 - 35.0 g/dL    RDW 47.9 35.9 - 50.0 fL    Platelet Count 235 164 - 446 K/uL    MPV 9.2 9.0 - 12.9 fL   FRESH FROZEN PLASMA    Collection Time: 10/12/18  9:52 AM   Result Value Ref Range    Component Ft       TA                  Thawed Plasma       [id number]   transfused   10/12/18   10:03      Product Type Thawed Apheresis Plasma     Dispense Status Transfused     Unit Number (Barcoded) [id number]     Product Code (Barcoded) Y0080K49     Blood Type (Barcoded) 6200     Component Ft       TA                  Thawed Plasma       [id number]   transfused   10/12/18   10:32      Product Type Thawed Apheresis Plasma     Dispense Status Transfused     Unit Number (Barcoded) [id number]     Product Code (Barcoded) O5287D90     Blood Type (Barcoded) 6200    PLATELET MAPPING WITH BASIC TEG    Collection Time: 10/12/18 12:30 PM   Result Value Ref Range    Reaction Time Initial-R 4.8 (L) 5.0 - 10.0 min    Clot Kinetics-K 1.3 1.0 - 3.0 min    Clot Angle-Angle 71.2 53.0 - 72.0 degrees    Maximum Clot Strength-MA 70.7 (H) 50.0 - 70.0 mm    Lysis 30 minutes-LY30 0.0 0.0 - 8.0 %    % Inhibition ADP 4.3 %    % Inhibition AA 0.0 %    TEG Algorithm Link Algorithm    HGB    Collection Time: 10/12/18 12:30 PM   Result Value Ref Range    Hemoglobin 11.0 (L) 12.0 - 16.0 g/dL   PROTHROMBIN TIME    Collection Time: 10/12/18 12:30 PM   Result Value Ref Range    PT 21.2 (H) 12.0 - 14.6 sec    INR 1.82 (H) 0.87 - 1.13   HEMOGLOBIN A1C    Collection Time: 10/12/18 12:30 PM   Result Value Ref Range    Glycohemoglobin 6.1 (H) 0.0 - 5.6 %    Est Avg Glucose 128 mg/dL   HGB    Collection Time: 10/12/18  8:32 PM   Result Value Ref Range    Hemoglobin 9.7 (L) 12.0 - 16.0 g/dL   CBC with Differential    Collection Time: 10/13/18  4:55 AM   Result Value Ref Range    WBC 6.9  4.8 - 10.8 K/uL    RBC 2.96 (L) 4.20 - 5.40 M/uL    Hemoglobin 9.0 (L) 12.0 - 16.0 g/dL    Hematocrit 27.8 (L) 37.0 - 47.0 %    MCV 93.9 81.4 - 97.8 fL    MCH 30.4 27.0 - 33.0 pg    MCHC 32.4 (L) 33.6 - 35.0 g/dL    RDW 53.0 (H) 35.9 - 50.0 fL    Platelet Count 182 164 - 446 K/uL    MPV 9.5 9.0 - 12.9 fL    Neutrophils-Polys 56.00 44.00 - 72.00 %    Lymphocytes 29.40 22.00 - 41.00 %    Monocytes 8.50 0.00 - 13.40 %    Eosinophils 4.90 0.00 - 6.90 %    Basophils 0.60 0.00 - 1.80 %    Immature Granulocytes 0.60 0.00 - 0.90 %    Nucleated RBC 0.00 /100 WBC    Neutrophils (Absolute) 3.89 2.00 - 7.15 K/uL    Lymphs (Absolute) 2.04 1.00 - 4.80 K/uL    Monos (Absolute) 0.59 0.00 - 0.85 K/uL    Eos (Absolute) 0.34 0.00 - 0.51 K/uL    Baso (Absolute) 0.04 0.00 - 0.12 K/uL    Immature Granulocytes (abs) 0.04 0.00 - 0.11 K/uL    NRBC (Absolute) 0.00 K/uL   Basic Metabolic Panel (BMP)    Collection Time: 10/13/18  4:55 AM   Result Value Ref Range    Sodium 143 135 - 145 mmol/L    Potassium 3.4 (L) 3.6 - 5.5 mmol/L    Chloride 111 96 - 112 mmol/L    Co2 26 20 - 33 mmol/L    Glucose 105 (H) 65 - 99 mg/dL    Bun 11 8 - 22 mg/dL    Creatinine 0.50 0.50 - 1.40 mg/dL    Calcium 7.9 (L) 8.5 - 10.5 mg/dL    Anion Gap 6.0 0.0 - 11.9   ESTIMATED GFR    Collection Time: 10/13/18  4:55 AM   Result Value Ref Range    GFR If African American >60 >60 mL/min/1.73 m 2    GFR If Non African American >60 >60 mL/min/1.73 m 2   PROTHROMBIN TIME    Collection Time: 10/13/18  5:11 AM   Result Value Ref Range    PT 15.9 (H) 12.0 - 14.6 sec    INR 1.26 (H) 0.87 - 1.13   HGB    Collection Time: 10/13/18  1:10 PM   Result Value Ref Range    Hemoglobin 9.2 (L) 12.0 - 16.0 g/dL   CBC WITHOUT DIFFERENTIAL    Collection Time: 10/14/18  4:40 AM   Result Value Ref Range    WBC 7.1 4.8 - 10.8 K/uL    RBC 2.90 (L) 4.20 - 5.40 M/uL    Hemoglobin 8.9 (L) 12.0 - 16.0 g/dL    Hematocrit 27.5 (L) 37.0 - 47.0 %    MCV 94.8 81.4 - 97.8 fL    MCH 30.7 27.0 - 33.0 pg     MCHC 32.4 (L) 33.6 - 35.0 g/dL    RDW 51.1 (H) 35.9 - 50.0 fL    Platelet Count 173 164 - 446 K/uL    MPV 9.5 9.0 - 12.9 fL   BASIC METABOLIC PANEL    Collection Time: 10/14/18  4:40 AM   Result Value Ref Range    Sodium 142 135 - 145 mmol/L    Potassium 3.7 3.6 - 5.5 mmol/L    Chloride 108 96 - 112 mmol/L    Co2 28 20 - 33 mmol/L    Glucose 105 (H) 65 - 99 mg/dL    Bun 9 8 - 22 mg/dL    Creatinine 0.57 0.50 - 1.40 mg/dL    Calcium 8.3 (L) 8.5 - 10.5 mg/dL    Anion Gap 6.0 0.0 - 11.9   ESTIMATED GFR    Collection Time: 10/14/18  4:40 AM   Result Value Ref Range    GFR If African American >60 >60 mL/min/1.73 m 2    GFR If Non African American >60 >60 mL/min/1.73 m 2   EC-ECHOCARDIOGRAM COMPLETE W/O CONT    Collection Time: 10/14/18  1:03 PM   Result Value Ref Range    Eject.Frac. MOD BP 51.28     Eject.Frac. MOD 4C 70.17     Eject.Frac. MOD 2C 25.72     Left Ventrical Ejection Fraction 70    CBC WITH DIFFERENTIAL    Collection Time: 01/21/19  9:33 AM   Result Value Ref Range    WBC 7.8 4.8 - 10.8 K/uL    RBC 4.67 4.20 - 5.40 M/uL    Hemoglobin 12.2 12.0 - 16.0 g/dL    Hematocrit 39.7 37.0 - 47.0 %    MCV 85.0 81.4 - 97.8 fL    MCH 26.1 (L) 27.0 - 33.0 pg    MCHC 30.7 (L) 33.6 - 35.0 g/dL    RDW 46.9 35.9 - 50.0 fL    Platelet Count 283 164 - 446 K/uL    MPV 9.8 9.0 - 12.9 fL    Neutrophils-Polys 57.90 44.00 - 72.00 %    Lymphocytes 27.00 22.00 - 41.00 %    Monocytes 10.80 0.00 - 13.40 %    Eosinophils 3.40 0.00 - 6.90 %    Basophils 0.80 0.00 - 1.80 %    Immature Granulocytes 0.10 0.00 - 0.90 %    Nucleated RBC 0.00 /100 WBC    Neutrophils (Absolute) 4.53 2.00 - 7.15 K/uL    Lymphs (Absolute) 2.12 1.00 - 4.80 K/uL    Monos (Absolute) 0.85 0.00 - 0.85 K/uL    Eos (Absolute) 0.27 0.00 - 0.51 K/uL    Baso (Absolute) 0.06 0.00 - 0.12 K/uL    Immature Granulocytes (abs) 0.01 0.00 - 0.11 K/uL    NRBC (Absolute) 0.00 K/uL   COMP METABOLIC PANEL    Collection Time: 01/21/19  9:33 AM   Result Value Ref Range    Sodium 138 135  - 145 mmol/L    Potassium 3.7 3.6 - 5.5 mmol/L    Chloride 104 96 - 112 mmol/L    Co2 26 20 - 33 mmol/L    Anion Gap 8.0 0.0 - 11.9    Glucose 105 (H) 65 - 99 mg/dL    Bun 10 8 - 22 mg/dL    Creatinine 0.70 0.50 - 1.40 mg/dL    Calcium 9.4 8.5 - 10.5 mg/dL    AST(SGOT) 12 12 - 45 U/L    ALT(SGPT) 9 2 - 50 U/L    Alkaline Phosphatase 79 30 - 99 U/L    Total Bilirubin 0.5 0.1 - 1.5 mg/dL    Albumin 4.0 3.2 - 4.9 g/dL    Total Protein 7.2 6.0 - 8.2 g/dL    Globulin 3.2 1.9 - 3.5 g/dL    A-G Ratio 1.3 g/dL   HEMOGLOBIN A1C    Collection Time: 19  9:33 AM   Result Value Ref Range    Glycohemoglobin 6.1 (H) 0.0 - 5.6 %    Est Avg Glucose 128 mg/dL   Lipid Profile    Collection Time: 19  9:33 AM   Result Value Ref Range    Cholesterol,Tot 176 100 - 199 mg/dL    Triglycerides 101 0 - 149 mg/dL    HDL 51 >=40 mg/dL     (H) <100 mg/dL   Prothrombin Time    Collection Time: 19  9:33 AM   Result Value Ref Range    PT 14.5 12.0 - 14.6 sec    INR 1.12 0.87 - 1.13   ESTIMATED GFR    Collection Time: 19  9:33 AM   Result Value Ref Range    GFR If African American >60 >60 mL/min/1.73 m 2    GFR If Non African American >60 >60 mL/min/1.73 m 2   FASTING STATUS    Collection Time: 19 10:04 AM   Result Value Ref Range    Fasting Status Non-Fasting      Results for orders placed or performed in visit on 19   EKG   Result Value Ref Range    Report       [facility] [place] [facility]    Test Date:  2019  Pt Name:    EBONY FERNANDEZ            Department: [facility]  MRN:        [id number]                      Room:  Gender:     Female                       Technician: YOLANDA  :        1950                   Requested By:ANGELO ARRIAGA  Order #:    [id number]                    Reading MD:    Measurements  Intervals                                Axis  Rate:       71                           P:          60  ME:         180                          QRS:        -78  QRSD:       90                            T:          56  QT:         452  QTc:        492    Interpretive Statements  SINUS RHYTHM  LEFT ANTERIOR FASCICULAR BLOCK  LATE PRECORDIAL R/S TRANSITION  BORDERLINE PROLONGED QT INTERVAL  No previous ECG available for comparison         No results found for this or any previous visit.      Assessment:     1. VSD (ventricular septal defect)  EKG    CANCELED: EKG   2. Venous insufficiency of both lower extremities  EKG   3. Morbid obesity (HCC)         Medical Decision Making:  Today's Assessment / Status / Plan:     It was my pleasure to meet with Ms. Ray.    Blood pressure is well controlled.      She will follow up with vein clinic for venous insufficiency    We reviewed the images of her echo which shows a VSD but no right heart enlargement they are limited by her body habitus but adequate    There is report of having aortic arch hypoplasia but this is not documented on prior CT scans a full details    Her lipids are normal    We discussed the importance of meaningful weight loss.    I will see Ms. Ray back in 1 year time and encouraged her to follow up with us over the phone or e-mail using my MyChart as issues arise.    It is my pleasure to participate in the care of Ms. Ray.  Please do not hesitate to contact me with questions or concerns.    Adiel Collado MD PhD [facility]  Cardiologist [facility]    Please note that this dictation was created using voice recognition software. I have worked with consultants from the vendor as well as technical experts from [facility] to optimize the interface. I have made every reasonable attempt to correct obvious errors, but I expect that there are errors of grammar and possibly content I did not discover before finalizing the note.         Sara Mei D.O.  [address]  [address]  [place] [address]  VIA In Basket

## 2019-03-07 DIAGNOSIS — I10 ESSENTIAL HYPERTENSION: Primary | ICD-10-CM

## 2019-03-07 LAB — EKG IMPRESSION: NORMAL

## 2019-03-07 NOTE — PROGRESS NOTES
Chief Complaint   Patient presents with   • Ventricular Septal Defect       Subjective:   Fior Ray is a 68 y.o. female who presents today in consultation from Dr. Mei for evaluation of her history of VSD which he has known about from childhood she had a evaluation in 2006 at [facility] which is available in the records and then subsequently had moved to [facility] and follow with cardiology there is been no apparent consequence of her long-term VSD she does struggled long-term with morbid obesity since childhood and has developed venous insufficiency and lymphedema and establish with vein clinic.  She is also had long-standing hypertension which is well controlled on minimal medicines.  She had a pulmonary embolism after knee surgery which was quite complicated eventually getting a IVC filter with history of significant GI bleed last October on anticoagulation    She also has sleep apnea but was unable to wear CPAP due to claustrophobia    Past Medical History:   Diagnosis Date   • Diverticulitis    • Hypertension    • Morbid obesity (HCC)    • Obesity    • TOMI (obstructive sleep apnea)    • PE (pulmonary thromboembolism) (HCC)    • Venous insufficiency of both lower extremities    • VSD (ventricular septal defect and aortic arch hypoplasia      Past Surgical History:   Procedure Laterality Date   • KNEE ARTHROPLASTY TOTAL Right 2014   • HYSTERECTOMY, VAGINAL     • VEIN STRIPPING       Family History   Problem Relation Age of Onset   • Lung Disease Mother         COPD    • Heart Attack Father    • No Known Problems Sister    • No Known Problems Brother    • No Known Problems Maternal Grandmother    • No Known Problems Maternal Grandfather    • No Known Problems Paternal Grandmother    • Heart Attack Paternal Grandfather      Social History     Social History   • Marital status:      Spouse name: N/A   • Number of children: N/A   • Years of education: N/A     Occupational  "History   • Not on file.     Social History Main Topics   • Smoking status: Never Smoker   • Smokeless tobacco: Never Used   • Alcohol use No   • Drug use: No   • Sexual activity: Not on file     Other Topics Concern   • Not on file     Social History Narrative   • No narrative on file     Allergies   Allergen Reactions   • Mustard [Allyl Isothiocyanate] Anaphylaxis   • Prochlorperazine Edisylate [Compazine] Anaphylaxis     Outpatient Encounter Prescriptions as of 3/6/2019   Medication Sig Dispense Refill   • lisinopril-hydrochlorothiazide (PRINZIDE, ZESTORETIC) 20-25 MG per tablet Take 1 Tab by mouth every day. 90 Tab 3   • [DISCONTINUED] hydroCHLOROthiazide (HYDRODIURIL) 25 MG Tab Take 1 Tab by mouth every morning. 30 Tab 11   • [DISCONTINUED] lisinopril (PRINIVIL) 20 MG Tab Take 1 Tab by mouth every day. 30 Tab 11     No facility-administered encounter medications on file as of 3/6/2019.      Review of Systems   Constitutional: Negative for chills and fever.   HENT: Negative for sore throat.    Eyes: Negative for blurred vision.   Respiratory: Negative for cough and shortness of breath.    Cardiovascular: Positive for orthopnea and leg swelling. Negative for chest pain, palpitations, claudication and PND.   Gastrointestinal: Negative for abdominal pain and nausea.   Genitourinary: Positive for frequency.   Musculoskeletal: Negative for falls and joint pain.   Skin: Negative for rash.   Neurological: Negative for dizziness, focal weakness and weakness.   Endo/Heme/Allergies: Does not bruise/bleed easily.        Objective:   /84 (BP Location: Left arm, Patient Position: Sitting, BP Cuff Size: Adult)   Pulse 76   Ht 1.626 m (5' 4\")   Wt (!) 128.4 kg (283 lb)   SpO2 93%   BMI 48.58 kg/m²     Physical Exam   Constitutional: No distress.   HENT:   Mouth/Throat: Oropharynx is clear and moist. No oropharyngeal exudate.   Eyes: No scleral icterus.   Neck: No JVD present.   Cardiovascular: Normal rate and normal " heart sounds.  Exam reveals no gallop and no friction rub.    No murmur heard.  Pulmonary/Chest: No respiratory distress. She has no wheezes. She has no rales.   Abdominal: Soft. Bowel sounds are normal.   Musculoskeletal: She exhibits edema (Venous insufficiency and lymphedema with obesity).   Neurological: She is alert.   Skin: No rash noted. She is not diaphoretic.   Psychiatric: She has a normal mood and affect.     We reviewed in person the recent labs  Recent Results (from the past 4032 hour(s))   CBC WITH DIFFERENTIAL    Collection Time: 10/12/18  7:52 AM   Result Value Ref Range    WBC 7.7 4.8 - 10.8 K/uL    RBC 4.16 (L) 4.20 - 5.40 M/uL    Hemoglobin 12.3 12.0 - 16.0 g/dL    Hematocrit 39.2 37.0 - 47.0 %    MCV 94.2 81.4 - 97.8 fL    MCH 29.6 27.0 - 33.0 pg    MCHC 31.4 (L) 33.6 - 35.0 g/dL    RDW 48.9 35.9 - 50.0 fL    Platelet Count 270 164 - 446 K/uL    MPV 9.3 9.0 - 12.9 fL    Neutrophils-Polys 61.30 44.00 - 72.00 %    Lymphocytes 26.70 22.00 - 41.00 %    Monocytes 7.80 0.00 - 13.40 %    Eosinophils 3.20 0.00 - 6.90 %    Basophils 0.60 0.00 - 1.80 %    Immature Granulocytes 0.40 0.00 - 0.90 %    Nucleated RBC 0.00 /100 WBC    Neutrophils (Absolute) 4.73 2.00 - 7.15 K/uL    Lymphs (Absolute) 2.06 1.00 - 4.80 K/uL    Monos (Absolute) 0.60 0.00 - 0.85 K/uL    Eos (Absolute) 0.25 0.00 - 0.51 K/uL    Baso (Absolute) 0.05 0.00 - 0.12 K/uL    Immature Granulocytes (abs) 0.03 0.00 - 0.11 K/uL    NRBC (Absolute) 0.00 K/uL   COMP METABOLIC PANEL    Collection Time: 10/12/18  7:52 AM   Result Value Ref Range    Sodium 141 135 - 145 mmol/L    Potassium 3.7 3.6 - 5.5 mmol/L    Chloride 106 96 - 112 mmol/L    Co2 25 20 - 33 mmol/L    Anion Gap 10.0 0.0 - 11.9    Glucose 114 (H) 65 - 99 mg/dL    Bun 13 8 - 22 mg/dL    Creatinine 0.66 0.50 - 1.40 mg/dL    Calcium 9.2 8.5 - 10.5 mg/dL    AST(SGOT) 15 12 - 45 U/L    ALT(SGPT) 14 2 - 50 U/L    Alkaline Phosphatase 80 30 - 99 U/L    Total Bilirubin 0.4 0.1 - 1.5 mg/dL     Albumin 3.8 3.2 - 4.9 g/dL    Total Protein 7.2 6.0 - 8.2 g/dL    Globulin 3.4 1.9 - 3.5 g/dL    A-G Ratio 1.1 g/dL   PROTHROMBIN TIME (INR)    Collection Time: 10/12/18  7:52 AM   Result Value Ref Range    PT 31.9 (H) 12.0 - 14.6 sec    INR 3.09 (H) 0.87 - 1.13   APTT    Collection Time: 10/12/18  7:52 AM   Result Value Ref Range    APTT 45.1 (H) 24.7 - 36.0 sec   COD (ADULT)    Collection Time: 10/12/18  7:52 AM   Result Value Ref Range    ABO Grouping Only A     Rh Grouping Only POS     Antibody Screen-Cod NEG     Component R       R3                  Red Blood Cells3    [id number]   transfused   10/12/18   09:58      Product Type Red Blood Cells LR Pheresis     Dispense Status Transfused     Unit Number (Barcoded) [id number]     Product Code (Barcoded) Y1083Y03     Blood Type (Barcoded) 6200     Component R       R3                  Red Blood Cells3    [id number]   transfused   10/12/18   10:44      Product Type Red Blood Cells LR Pheresis     Dispense Status Transfused     Unit Number (Barcoded) [id number]     Product Code (Barcoded) K2165P34     Blood Type (Barcoded) 6200    ESTIMATED GFR    Collection Time: 10/12/18  7:52 AM   Result Value Ref Range    GFR If African American >60 >60 mL/min/1.73 m 2    GFR If Non African American >60 >60 mL/min/1.73 m 2   ABO AND RH CONFIRMATION    Collection Time: 10/12/18  8:41 AM   Result Value Ref Range    ABO Confirm A     Second Rh Group POS    LACTIC ACID    Collection Time: 10/12/18  8:41 AM   Result Value Ref Range    Lactic Acid 1.8 0.5 - 2.0 mmol/L   CBC WITHOUT DIFFERENTIAL    Collection Time: 10/12/18  9:52 AM   Result Value Ref Range    WBC 8.2 4.8 - 10.8 K/uL    RBC 3.30 (L) 4.20 - 5.40 M/uL    Hemoglobin 10.1 (L) 12.0 - 16.0 g/dL    Hematocrit 31.2 (L) 37.0 - 47.0 %    MCV 94.5 81.4 - 97.8 fL    MCH 30.6 27.0 - 33.0 pg    MCHC 32.4 (L) 33.6 - 35.0 g/dL    RDW 47.9 35.9 - 50.0 fL    Platelet Count 235 164 - 446 K/uL    MPV 9.2 9.0 - 12.9 fL   FRESH  FROZEN PLASMA    Collection Time: 10/12/18  9:52 AM   Result Value Ref Range    Component Ft       TA                  Thawed Plasma       [id number]   transfused   10/12/18   10:03      Product Type Thawed Apheresis Plasma     Dispense Status Transfused     Unit Number (Barcoded) [id number]     Product Code (Barcoded) H3765Y96     Blood Type (Barcoded) 6200     Component Ft       TA                  Thawed Plasma       [id number]   transfused   10/12/18   10:32      Product Type Thawed Apheresis Plasma     Dispense Status Transfused     Unit Number (Barcoded) [id number]     Product Code (Barcoded) Y7422W70     Blood Type (Barcoded) 6200    PLATELET MAPPING WITH BASIC TEG    Collection Time: 10/12/18 12:30 PM   Result Value Ref Range    Reaction Time Initial-R 4.8 (L) 5.0 - 10.0 min    Clot Kinetics-K 1.3 1.0 - 3.0 min    Clot Angle-Angle 71.2 53.0 - 72.0 degrees    Maximum Clot Strength-MA 70.7 (H) 50.0 - 70.0 mm    Lysis 30 minutes-LY30 0.0 0.0 - 8.0 %    % Inhibition ADP 4.3 %    % Inhibition AA 0.0 %    TEG Algorithm Link Algorithm    HGB    Collection Time: 10/12/18 12:30 PM   Result Value Ref Range    Hemoglobin 11.0 (L) 12.0 - 16.0 g/dL   PROTHROMBIN TIME    Collection Time: 10/12/18 12:30 PM   Result Value Ref Range    PT 21.2 (H) 12.0 - 14.6 sec    INR 1.82 (H) 0.87 - 1.13   HEMOGLOBIN A1C    Collection Time: 10/12/18 12:30 PM   Result Value Ref Range    Glycohemoglobin 6.1 (H) 0.0 - 5.6 %    Est Avg Glucose 128 mg/dL   HGB    Collection Time: 10/12/18  8:32 PM   Result Value Ref Range    Hemoglobin 9.7 (L) 12.0 - 16.0 g/dL   CBC with Differential    Collection Time: 10/13/18  4:55 AM   Result Value Ref Range    WBC 6.9 4.8 - 10.8 K/uL    RBC 2.96 (L) 4.20 - 5.40 M/uL    Hemoglobin 9.0 (L) 12.0 - 16.0 g/dL    Hematocrit 27.8 (L) 37.0 - 47.0 %    MCV 93.9 81.4 - 97.8 fL    MCH 30.4 27.0 - 33.0 pg    MCHC 32.4 (L) 33.6 - 35.0 g/dL    RDW 53.0 (H) 35.9 - 50.0 fL    Platelet Count 182 164 - 446 K/uL     MPV 9.5 9.0 - 12.9 fL    Neutrophils-Polys 56.00 44.00 - 72.00 %    Lymphocytes 29.40 22.00 - 41.00 %    Monocytes 8.50 0.00 - 13.40 %    Eosinophils 4.90 0.00 - 6.90 %    Basophils 0.60 0.00 - 1.80 %    Immature Granulocytes 0.60 0.00 - 0.90 %    Nucleated RBC 0.00 /100 WBC    Neutrophils (Absolute) 3.89 2.00 - 7.15 K/uL    Lymphs (Absolute) 2.04 1.00 - 4.80 K/uL    Monos (Absolute) 0.59 0.00 - 0.85 K/uL    Eos (Absolute) 0.34 0.00 - 0.51 K/uL    Baso (Absolute) 0.04 0.00 - 0.12 K/uL    Immature Granulocytes (abs) 0.04 0.00 - 0.11 K/uL    NRBC (Absolute) 0.00 K/uL   Basic Metabolic Panel (BMP)    Collection Time: 10/13/18  4:55 AM   Result Value Ref Range    Sodium 143 135 - 145 mmol/L    Potassium 3.4 (L) 3.6 - 5.5 mmol/L    Chloride 111 96 - 112 mmol/L    Co2 26 20 - 33 mmol/L    Glucose 105 (H) 65 - 99 mg/dL    Bun 11 8 - 22 mg/dL    Creatinine 0.50 0.50 - 1.40 mg/dL    Calcium 7.9 (L) 8.5 - 10.5 mg/dL    Anion Gap 6.0 0.0 - 11.9   ESTIMATED GFR    Collection Time: 10/13/18  4:55 AM   Result Value Ref Range    GFR If African American >60 >60 mL/min/1.73 m 2    GFR If Non African American >60 >60 mL/min/1.73 m 2   PROTHROMBIN TIME    Collection Time: 10/13/18  5:11 AM   Result Value Ref Range    PT 15.9 (H) 12.0 - 14.6 sec    INR 1.26 (H) 0.87 - 1.13   HGB    Collection Time: 10/13/18  1:10 PM   Result Value Ref Range    Hemoglobin 9.2 (L) 12.0 - 16.0 g/dL   CBC WITHOUT DIFFERENTIAL    Collection Time: 10/14/18  4:40 AM   Result Value Ref Range    WBC 7.1 4.8 - 10.8 K/uL    RBC 2.90 (L) 4.20 - 5.40 M/uL    Hemoglobin 8.9 (L) 12.0 - 16.0 g/dL    Hematocrit 27.5 (L) 37.0 - 47.0 %    MCV 94.8 81.4 - 97.8 fL    MCH 30.7 27.0 - 33.0 pg    MCHC 32.4 (L) 33.6 - 35.0 g/dL    RDW 51.1 (H) 35.9 - 50.0 fL    Platelet Count 173 164 - 446 K/uL    MPV 9.5 9.0 - 12.9 fL   BASIC METABOLIC PANEL    Collection Time: 10/14/18  4:40 AM   Result Value Ref Range    Sodium 142 135 - 145 mmol/L    Potassium 3.7 3.6 - 5.5 mmol/L     Chloride 108 96 - 112 mmol/L    Co2 28 20 - 33 mmol/L    Glucose 105 (H) 65 - 99 mg/dL    Bun 9 8 - 22 mg/dL    Creatinine 0.57 0.50 - 1.40 mg/dL    Calcium 8.3 (L) 8.5 - 10.5 mg/dL    Anion Gap 6.0 0.0 - 11.9   ESTIMATED GFR    Collection Time: 10/14/18  4:40 AM   Result Value Ref Range    GFR If African American >60 >60 mL/min/1.73 m 2    GFR If Non African American >60 >60 mL/min/1.73 m 2   EC-ECHOCARDIOGRAM COMPLETE W/O CONT    Collection Time: 10/14/18  1:03 PM   Result Value Ref Range    Eject.Frac. MOD BP 51.28     Eject.Frac. MOD 4C 70.17     Eject.Frac. MOD 2C 25.72     Left Ventrical Ejection Fraction 70    CBC WITH DIFFERENTIAL    Collection Time: 01/21/19  9:33 AM   Result Value Ref Range    WBC 7.8 4.8 - 10.8 K/uL    RBC 4.67 4.20 - 5.40 M/uL    Hemoglobin 12.2 12.0 - 16.0 g/dL    Hematocrit 39.7 37.0 - 47.0 %    MCV 85.0 81.4 - 97.8 fL    MCH 26.1 (L) 27.0 - 33.0 pg    MCHC 30.7 (L) 33.6 - 35.0 g/dL    RDW 46.9 35.9 - 50.0 fL    Platelet Count 283 164 - 446 K/uL    MPV 9.8 9.0 - 12.9 fL    Neutrophils-Polys 57.90 44.00 - 72.00 %    Lymphocytes 27.00 22.00 - 41.00 %    Monocytes 10.80 0.00 - 13.40 %    Eosinophils 3.40 0.00 - 6.90 %    Basophils 0.80 0.00 - 1.80 %    Immature Granulocytes 0.10 0.00 - 0.90 %    Nucleated RBC 0.00 /100 WBC    Neutrophils (Absolute) 4.53 2.00 - 7.15 K/uL    Lymphs (Absolute) 2.12 1.00 - 4.80 K/uL    Monos (Absolute) 0.85 0.00 - 0.85 K/uL    Eos (Absolute) 0.27 0.00 - 0.51 K/uL    Baso (Absolute) 0.06 0.00 - 0.12 K/uL    Immature Granulocytes (abs) 0.01 0.00 - 0.11 K/uL    NRBC (Absolute) 0.00 K/uL   COMP METABOLIC PANEL    Collection Time: 01/21/19  9:33 AM   Result Value Ref Range    Sodium 138 135 - 145 mmol/L    Potassium 3.7 3.6 - 5.5 mmol/L    Chloride 104 96 - 112 mmol/L    Co2 26 20 - 33 mmol/L    Anion Gap 8.0 0.0 - 11.9    Glucose 105 (H) 65 - 99 mg/dL    Bun 10 8 - 22 mg/dL    Creatinine 0.70 0.50 - 1.40 mg/dL    Calcium 9.4 8.5 - 10.5 mg/dL    AST(SGOT) 12 12 -  45 U/L    ALT(SGPT) 9 2 - 50 U/L    Alkaline Phosphatase 79 30 - 99 U/L    Total Bilirubin 0.5 0.1 - 1.5 mg/dL    Albumin 4.0 3.2 - 4.9 g/dL    Total Protein 7.2 6.0 - 8.2 g/dL    Globulin 3.2 1.9 - 3.5 g/dL    A-G Ratio 1.3 g/dL   HEMOGLOBIN A1C    Collection Time: 19  9:33 AM   Result Value Ref Range    Glycohemoglobin 6.1 (H) 0.0 - 5.6 %    Est Avg Glucose 128 mg/dL   Lipid Profile    Collection Time: 19  9:33 AM   Result Value Ref Range    Cholesterol,Tot 176 100 - 199 mg/dL    Triglycerides 101 0 - 149 mg/dL    HDL 51 >=40 mg/dL     (H) <100 mg/dL   Prothrombin Time    Collection Time: 19  9:33 AM   Result Value Ref Range    PT 14.5 12.0 - 14.6 sec    INR 1.12 0.87 - 1.13   ESTIMATED GFR    Collection Time: 19  9:33 AM   Result Value Ref Range    GFR If African American >60 >60 mL/min/1.73 m 2    GFR If Non African American >60 >60 mL/min/1.73 m 2   FASTING STATUS    Collection Time: 19 10:04 AM   Result Value Ref Range    Fasting Status Non-Fasting      Results for orders placed or performed in visit on 19   EKG   Result Value Ref Range    Report       [facility]    Test Date:  2019  Pt Name:    EBONY FERNANDEZ            Department: [facility]  MRN:        [id number]                      Room:  Gender:     Female                       Technician: YOLANDA JOHNB:        [id number]                   Requested By:ANGELO ARRIAGA  Order #:    [id number]                    Reading MD:    Measurements  Intervals                                Axis  Rate:       71                           P:          60  MT:         180                          QRS:        -78  QRSD:       90                           T:          56  QT:         452  QTc:        492    Interpretive Statements  SINUS RHYTHM  LEFT ANTERIOR FASCICULAR BLOCK  LATE PRECORDIAL R/S TRANSITION  BORDERLINE PROLONGED QT INTERVAL  No previous ECG available for comparison         No results found for this  or any previous visit.      Assessment:     1. VSD (ventricular septal defect)  EKG    CANCELED: EKG   2. Venous insufficiency of both lower extremities  EKG   3. Morbid obesity (HCC)         Medical Decision Making:  Today's Assessment / Status / Plan:     It was my pleasure to meet with Ms. Ray.    Blood pressure is well controlled.      She will follow up with vein clinic for venous insufficiency    We reviewed the images of her echo which shows a VSD but no right heart enlargement they are limited by her body habitus but adequate    There is report of having aortic arch hypoplasia but this is not documented on prior CT scans a full details    Her lipids are normal    We discussed the importance of meaningful weight loss.    I will see Ms. Ray back in 1 year time and encouraged her to follow up with us over the phone or e-mail using my MyChart as issues arise.    It is my pleasure to participate in the care of Ms. Ray.  Please do not hesitate to contact me with questions or concerns.    Adiel Collado MD PhD [facility]  Cardiologist [facility]    Please note that this dictation was created using voice recognition software. I have worked with consultants from the vendor as well as technical experts from [facility] to optimize the interface. I have made every reasonable attempt to correct obvious errors, but I expect that there are errors of grammar and possibly content I did not discover before finalizing the note.

## 2019-03-07 NOTE — PATIENT INSTRUCTIONS
Please look into the following diets and incorporate them into your diet    LOW SALT DIET   KEEP YOUR SODIUM EQUAL TO CALORIES AND NO MORE THAN DOUBLE THE CALORIES FOR A LOW SALT DIET    FOR TREATMENT OR PREVENTION OF CORONARY ARTERY DISEASE    [facility]    Dr Gavin Ryder (book and documentary)    Dr Vicki Hicks's Cardiologist    FOR TREATMENT OF BLOOD PRESSURE  DASH DIET - American Heart Association for treatment of HYPERTENSION    FOR TREATMENT OF BAD CHOLESTEROL/FATS  REDUCE PROCESSED SUGAR AS MUCH AS POSSIBLE  INCREASE WHOLE GRAINS/VEGETABLES    Lowering total cholesterol and LDL (bad) cholesterol:  - Eat leaner cuts of meat, or eliminate altogether if possible red meat, and frequently substitute fish or chicken.  - Limit saturated fat to no more than 7-10% of total calories no more than 10 g per day is recommended. Some sources of saturated fat include butter, animal fats, hydrogenated vegetable fats and oils, many desserts, whole milk dairy products.  - Replaced saturated fats with polyunsaturated fats and monounsaturated fats. Foods high in monounsaturated fat include nuts, although well, canola oil, avocados, and olives.  - Limit trans fat (processed foods) and replaced with fresh fruits and vegetables  - Recommend nonfat dairy products  - Increase substantially the amount of soluble fiber intake (legumes such as beans, fruit, whole grains).  - Consider nutritional supplements: plant sterile spreads such as Benecol, fish oil,  flaxseed oil, omega-3 acids capsules 1000 mg twice a day, or viscous fiber such as Metamucil  - Attain ideal weight and regular exercise (at least 30 minutes per day of walking)    Lowering triglycerides:  - Reduce intake of simple sugar: Desserts, candy, pastries, honey, sodas, sugared cereals, yogurt, Gatorade, sports bars, canned fruit, smoothies, fruit juice, coffee drinks  - Reduced intake of refined starches: Refined  Pasta  - Reduce or abstain from alcohol  - Increase omega-3 fatty acids: [place], Trout, Mackerel, Herring, [place] tuna and supplements  - Attain ideal weight and regular exercise (at least 30 minutes per day of walking)      Elevating HDL (good) cholesterol:  - Increase physical activity  - Seasoned foods with garlic and onions  - Increase omega-3 fatty acids and supplements as listed above  - Incorporating appropriate amounts of monounsaturated fats such as nuts, olive oil, canola oil, avocados, olives  - Stop smoking  - Attain ideal weight and regular exercise (at least 30 minutes per day of walking)

## 2019-03-08 RX ORDER — LISINOPRIL AND HYDROCHLOROTHIAZIDE 25; 20 MG/1; MG/1
1 TABLET ORAL DAILY
Qty: 90 TAB | Refills: 3 | Status: SHIPPED | OUTPATIENT
Start: 2019-03-08 | End: 2019-03-29 | Stop reason: SDUPTHER

## 2019-03-19 ENCOUNTER — TELEPHONE (OUTPATIENT)
Dept: CARDIOLOGY | Facility: MEDICAL CENTER | Age: 69
End: 2019-03-19

## 2019-03-20 NOTE — TELEPHONE ENCOUNTER
Received fax from [facility] (P: [phone] F: [phone]) requesting cardiac clearance for Bilateral lower extremity vein ablations of the great and small saphenous veins.    Fax relayed to MD for advise.

## 2019-03-20 NOTE — TELEPHONE ENCOUNTER
Letter printed with MD recommendations.      Letter fax to [facility], [phone], completed status.

## 2019-03-26 ENCOUNTER — PHYSICAL THERAPY (OUTPATIENT)
Dept: PHYSICAL THERAPY | Facility: REHABILITATION | Age: 69
End: 2019-03-26
Attending: PHYSICIAN ASSISTANT
Payer: MEDICARE

## 2019-03-26 DIAGNOSIS — I89.0 LYMPHEDEMA OF BOTH LOWER EXTREMITIES: ICD-10-CM

## 2019-03-26 DIAGNOSIS — I89.0 LYMPHEDEMA, NOT ELSEWHERE CLASSIFIED: ICD-10-CM

## 2019-03-26 PROCEDURE — 97535 SELF CARE MNGMENT TRAINING: CPT

## 2019-03-26 PROCEDURE — 97161 PT EVAL LOW COMPLEX 20 MIN: CPT

## 2019-03-26 ASSESSMENT — ENCOUNTER SYMPTOMS
EXACERBATED BY: PROLONGED SITTING
PAIN SCALE AT HIGHEST: 8
EXACERBATED BY: WALKING
ALLEVIATING FACTORS: NOTHING
EXACERBATED BY: STANDING
PAIN TIMING: EVERY EVENING
PAIN SCALE AT LOWEST: 4
PAIN LOCATION: BILATERAL CALVES
QUALITY: BURNING
PAIN SCALE: 6

## 2019-03-26 NOTE — OP THERAPY EVALUATION
Outpatient Physical Therapy  LYMPHEDEMA THERAPY INITIAL EVALUATION    [facility] [address]  [address]  [place] [address]  Phone:  [phone]  Fax:  [phone]    Date of Evaluation: 2019    Patient: Fior Ray  YOB: 1950  MRN: [id number]     Referring Provider: Kadie Dorany  [facility] [address]  [place] [address]   Referring Diagnosis Lymphedema, not elsewhere classified [I89.0]     Time Calculation  Start time: 1630  Stop time: 1713 Time Calculation (min): 43 minutes         Chief Complaint: No chief complaint on file.    Visit Diagnoses     ICD-10-CM   1. Lymphedema, not elsewhere classified I89.0   2. Lymphedema of both lower extremities I89.0       Subjective:   History of Present Illness:     Mechanism of injury:  Patient reports that she has had difficulties with obesity and swelling since her youth. She went to see her primary care MD who referred her to [facility] for assessment as she was concerned there may be blockages to address. She has extensive history of blood clots, and was on thinners for quite some time, but now has a vena cava filter. She has been issued compression socks but has not been able to get them on because they are too tight, she is also waiting on approval for a pump through her insurance company     Pain:     Current pain ratin    At best pain ratin    At worst pain ratin    Location:  Bilateral calves    Quality:  Burning    Pain timing:  Every evening    Relieving factors:  Nothing    Aggravating factors:  Walking, standing and prolonged sitting    Progression:  Worsening  Social Support:     Lives in:  One-story house    Lives with: lives with her neice   Treatments:     None    Patient Goals:     Patient goals for therapy:  Decreased edema      Past Medical History:   Diagnosis Date   • Diverticulitis    • Hypertension    • Morbid obesity (HCC)    • Obesity    • TOMI (obstructive  sleep apnea)    • PE (pulmonary thromboembolism) ([facility])    • Venous insufficiency of both lower extremities    • VSD (ventricular septal defect and aortic arch hypoplasia      Past Surgical History:   Procedure Laterality Date   • KNEE ARTHROPLASTY TOTAL Right 2014   • HYSTERECTOMY, VAGINAL     • VEIN STRIPPING       Social History   Substance Use Topics   • Smoking status: Never Smoker   • Smokeless tobacco: Never Used   • Alcohol use No     Family and Occupational History     Social History   • Marital status:      Spouse name: N/A   • Number of children: N/A   • Years of education: N/A       Lymphedema Objective    Postural Observation  Seated posture is poor  Standing posture is poor  Correction of posture      Skin Appearance    Non-pitting edema, dry, firm skin, hyperkeratosis, asymmetric skin folds, hemosiderin staining    Left Lower Extremity Circumferential Measurements  Ground/Upper Thigh cm  Mid Thigh cm  Knee 54.1 cm  Upper Calf 56.9 cm  Mid Calf 37.4 cm  Ankle 28.1 cm  Heel to Foot 26.9 (First measurement taken 8 cm from nailbed of second phalanx) cm  Total 203.4 cm    Right Lower Extremity Circumferential Measurements  Ground/Upper Thigh cm  Mid Thigh cm  Knee 55.4 cm  Upper Calf 48.6 cm  Mid Calf 40.8 cm  Ankle 34 cm  Heel to Foot 25.4 cm  Total 204.2 cm          Therapeutic Treatments and Modalities:     1. Functional Training, Self Care (CPT 66870), Educated patient with regards to lymphedema plan of care including: anatomy of the lymphatic system, lymphatic pathophysiology, primary versus secondary lymphedema complete decongestive therapy, risk reduction strategies, and potential out of pocket expenses associated with treatment.      Time-based treatments/modalities:  Functional training, self care minutes (CPT 52421): 23 minutes       Assessment and Plan:   Functional Impairments: lacks appropriate home exercise program, limited ADL's, limited mobility, pain with function and swelling     Assessment details:  Patient is a 69 year old female who presents to therapy for evaluation and treatment of bilateral lower extremity lymphedema. Clinical examination demonstrates the following impairments: +Stemmer's sign and dorsal hump bilaterally, hyperkeratosis and hemosiderin staining bilaterally, varicose veins, and history of DVT's. These impairments limit patients ability to: manage the health of her integumentary and lymphatic system and are consistent with diagnosis of Stage II lymphedema. Patient would benefit from complete decongestive therapy including: patient education, manual lymphatic drainage, compression garments as needed, and therapeutic exercise to improve lymphatic circulation, musculoskeletal strength and mobility, and prevent further functional decline. Unfortuately finances limit her from purchasing garments for maintenance decongestion, but we will work with patient to teach self manual lymphatic drainage as well as correct donning and doffing of compression stockings. If you have any questions regarding this plan of care please feel free to contact our clinic at [phone].      Barriers to therapy:  Comorbidities  Prognosis: fair      Goals:   Short Term Goals:  1 - Patient will be independent with donning and doffing of her compression garments  Short term goal time span:  2-4 weeks    Long Term Goals:  1 - Patient will be independent with maintenance phase management of lymphedema including: compression garments, skin care education, risk reduction strategies, manual lymphatic drainage, and therapeutic exercise  Long term goal time span:  6-8 weeks    Plan:  Therapy options:  Physical therapy treatment to continue  Planned therapy interventions:  Addressing equipment needs, ADL treatments (CPT 23623), caregiver education, compression bandaging, compression stocking, decongestive exercises, home exercise program, intermittent compression, manual lymph drainage, strengthening  exercises, soft tissue manual techniques (CPT 09437), skin/wound care, sequential compression pump, referral for compression garment & instructions for don/doffing and self-care/training (CPT 00715)  Planned education:  Activity guidelines, dietary guidelines, skin care guidelines, home pump use, long term self-management of lymphedema, infection prevention, self massage, proper skin care/nutrition, compression bandaging, lymphedema precautions, lymphedema exercise, pathophysiology of lymphedema and functional anatomy and physiology of the lymphatic system  Frequency:  1x week  Duration in weeks:  8  Duration in visits:  8      Functional Limitations and Severity Modifiers      Current:     Goal:       Referring provider co-signature:  I have reviewed this plan of care and my co-signature certifies the need for services.  Certification Dates:   From 3/26/19     To 5/21/19    Physician Signature: ________________________________ Date: ______________

## 2019-03-29 DIAGNOSIS — I10 ESSENTIAL HYPERTENSION: ICD-10-CM

## 2019-03-29 RX ORDER — LISINOPRIL AND HYDROCHLOROTHIAZIDE 25; 20 MG/1; MG/1
1 TABLET ORAL DAILY
Qty: 100 TAB | Refills: 3 | Status: SHIPPED | OUTPATIENT
Start: 2019-03-29 | End: 2019-05-02

## 2019-05-02 ENCOUNTER — OFFICE VISIT (OUTPATIENT)
Dept: MEDICAL GROUP | Facility: PHYSICIAN GROUP | Age: 69
End: 2019-05-02
Payer: MEDICARE

## 2019-05-02 VITALS
DIASTOLIC BLOOD PRESSURE: 84 MMHG | TEMPERATURE: 98 F | HEIGHT: 64 IN | WEIGHT: 282 LBS | SYSTOLIC BLOOD PRESSURE: 138 MMHG | HEART RATE: 82 BPM | OXYGEN SATURATION: 97 % | BODY MASS INDEX: 48.14 KG/M2

## 2019-05-02 DIAGNOSIS — I10 ESSENTIAL HYPERTENSION: ICD-10-CM

## 2019-05-02 DIAGNOSIS — R73.03 PREDIABETES: ICD-10-CM

## 2019-05-02 DIAGNOSIS — Z86.718 HISTORY OF RECURRENT DEEP VEIN THROMBOSIS (DVT): ICD-10-CM

## 2019-05-02 DIAGNOSIS — E78.49 OTHER HYPERLIPIDEMIA: ICD-10-CM

## 2019-05-02 PROCEDURE — 99214 OFFICE O/P EST MOD 30 MIN: CPT | Performed by: FAMILY MEDICINE

## 2019-05-02 RX ORDER — HYDROCHLOROTHIAZIDE 25 MG/1
25 TABLET ORAL DAILY
Qty: 90 TAB | Refills: 1 | Status: SHIPPED | OUTPATIENT
Start: 2019-05-02 | End: 2022-03-28

## 2019-05-02 RX ORDER — LISINOPRIL 30 MG/1
30 TABLET ORAL DAILY
Qty: 90 TAB | Refills: 1 | Status: SHIPPED | OUTPATIENT
Start: 2019-05-02 | End: 2019-08-27 | Stop reason: SDUPTHER

## 2019-05-02 NOTE — PROGRESS NOTES
CC: Hypertension    HISTORY OF THE PRESENT ILLNESS: Patient is a 69 y.o. female. This pleasant patient is here today to discuss the following health issue.    Hypertension: Patient is here for refills on her high blood pressure medications.  She is currently taking lisinopril-hydrochlorothiazide 20-25 mg daily.  She is actually asking to separate these medications, as she is going on a trip cross-country for multiple of her grandchildren's graduation's.  Her  and herself plan to drive.  She feels the hydrochlorothiazide makes her urinate a lot.  She still plans to take the medication every day, but would like to take it when she is not in the car not planning an event.  Her blood pressure has remained fairly in the 130s to 140 systolic on several recent checks.  She is open to increasing her blood pressure medication.  She has no red flag symptoms such as chest pain, headache, blurry vision.  She does have chronic lower extremity edema and follows with the vein clinic as well as going to lymphedema physical therapy.    Allergies: Mustard [allyl isothiocyanate] and Prochlorperazine edisylate [compazine]    Current Outpatient Prescriptions Ordered in [facility]   Medication Sig Dispense Refill   • lisinopril (PRINIVIL, ZESTRIL) 30 MG tablet Take 1 Tab by mouth every day. 90 Tab 1   • hydroCHLOROthiazide (HYDRODIURIL) 25 MG Tab Take 1 Tab by mouth every day. 90 Tab 1     No current Epic-ordered facility-administered medications on file.        Past Medical History:   Diagnosis Date   • Diverticulitis    • Hypertension    • Morbid obesity (HCC)    • Obesity    • TOMI (obstructive sleep apnea)    • PE (pulmonary thromboembolism) ([facility])    • Venous insufficiency of both lower extremities    • VSD (ventricular septal defect and aortic arch hypoplasia        Past Surgical History:   Procedure Laterality Date   • KNEE ARTHROPLASTY TOTAL Right 2014   • HYSTERECTOMY, VAGINAL     • VEIN STRIPPING         Social History  "  Substance Use Topics   • Smoking status: Never Smoker   • Smokeless tobacco: Never Used   • Alcohol use No       Social History     Social History Narrative   • No narrative on file       Family History   Problem Relation Age of Onset   • Lung Disease Mother         COPD    • Heart Attack Father    • No Known Problems Sister    • No Known Problems Brother    • No Known Problems Maternal Grandmother    • No Known Problems Maternal Grandfather    • No Known Problems Paternal Grandmother    • Heart Attack Paternal Grandfather        ROS:     - Constitutional: Negative for fever, chills, unexpected weight change, and fatigue/generalized weakness.     - Respiratory: Negative for cough, sputum production, chest congestion, dyspnea, wheezing, and crackles.      - Cardiovascular: Negative for chest pain, palpitations, orthopnea, PND.    Exam: /84 (BP Location: Right arm, Patient Position: Sitting, BP Cuff Size: Large adult)   Pulse 82   Temp 36.7 °C (98 °F) (Temporal)   Ht 1.626 m (5' 4\")   Wt (!) 127.9 kg (282 lb)   SpO2 97%  Body mass index is 48.41 kg/m².    General: Well appearing, NAD  Pulmonary: Clear to ausculation.  Normal effort. No rales, ronchi, or wheezing.  Cardiovascular: Regular rate and rhythm without murmur, rubs or gallop.  Dense nonpitting lower extremity edema bilaterally, consistent with lymphedema.  Skin: Warm and dry.  No obvious lesions.  Musculoskeletal:  No extremity cyanosis, clubbing, or edema.  Psych: Normal mood and affect. Alert and oriented. Judgment and insight is normal.    Please note that this dictation was created using voice recognition software. I have made every reasonable attempt to correct obvious errors, but I expect that there are errors of grammar and possibly content that I did not discover before finalizing the note.      Assessment/Plan  Fior was seen today for hypertension.    Diagnoses and all orders for this visit:    Essential hypertension  Chronic problem " for the patient.  She currently is above goal on the last several visits.  We will go ahead and separate medications and increase lisinopril to 30 mg from 20 mg daily.  I have also asked her to continue to take the hydrochlorothiazide daily, however she can take it when she needs to and when it is convenient for her.  She is due for CMP as well.  -     lisinopril (PRINIVIL, ZESTRIL) 30 MG tablet; Take 1 Tab by mouth every day.  -     hydroCHLOROthiazide (HYDRODIURIL) 25 MG Tab; Take 1 Tab by mouth every day.  -     Comp Metabolic Panel; Future    Prediabetes  Problem noted on labs in January 2019.  We will go ahead and recheck A1c today.  -     HEMOGLOBIN A1C; Future    Other hyperlipidemia  Chronic issue for the patient.  Based on her ASCVD risk score, she actually does qualify for statin.  We will go ahead and recheck lipid panel today and likely recommend statin.  -     Lipid Profile; Future    The 10-year ASCVD risk score ([place] MARSH Jr., et al., 2013) is: 12.8%    Values used to calculate the score:      Age: 69 years      Sex: Female      Is Non- : No      Diabetic: No      Tobacco smoker: No      Systolic Blood Pressure: 138 mmHg      Is BP treated: Yes      HDL Cholesterol: 51 mg/dL      Total Cholesterol: 176 mg/dL      History of recurrent deep vein thrombosis (DVT)  Patient has history of PE as well as DVT.  She does have an IVC filter in place.  Given that she is going on a very long cross-country drive as above, I have encouraged her to get out at least every couple of hours and walk around for a while to reduce her risk for recurrent clot.  She does agree to do so.    Follow-up in about 3 months.    Sara Mei DO  [facility]

## 2019-05-06 ENCOUNTER — TELEPHONE (OUTPATIENT)
Dept: MEDICAL GROUP | Facility: PHYSICIAN GROUP | Age: 69
End: 2019-05-06

## 2019-05-06 DIAGNOSIS — Z91.81 AT RISK FOR FALLS: ICD-10-CM

## 2019-05-06 NOTE — TELEPHONE ENCOUNTER
Pt came into office requesting a prescription for a new quad cane be ordered. Pt stated that this new cane will be quite helpful with her upcoming travels. Pt requesting the cane be sent to [facility] chest. Please advise.

## 2019-05-09 ENCOUNTER — HOSPITAL ENCOUNTER (OUTPATIENT)
Dept: LAB | Facility: MEDICAL CENTER | Age: 69
End: 2019-05-09
Attending: FAMILY MEDICINE
Payer: MEDICARE

## 2019-05-09 DIAGNOSIS — E78.49 OTHER HYPERLIPIDEMIA: ICD-10-CM

## 2019-05-09 DIAGNOSIS — I10 ESSENTIAL HYPERTENSION: ICD-10-CM

## 2019-05-09 DIAGNOSIS — R73.03 PREDIABETES: ICD-10-CM

## 2019-05-09 LAB
ALBUMIN SERPL BCP-MCNC: 3.8 G/DL (ref 3.2–4.9)
ALBUMIN/GLOB SERPL: 1.2 G/DL
ALP SERPL-CCNC: 76 U/L (ref 30–99)
ALT SERPL-CCNC: 5 U/L (ref 2–50)
ANION GAP SERPL CALC-SCNC: 11 MMOL/L (ref 0–11.9)
AST SERPL-CCNC: 16 U/L (ref 12–45)
BILIRUB SERPL-MCNC: 0.5 MG/DL (ref 0.1–1.5)
BUN SERPL-MCNC: 13 MG/DL (ref 8–22)
CALCIUM SERPL-MCNC: 9 MG/DL (ref 8.5–10.5)
CHLORIDE SERPL-SCNC: 107 MMOL/L (ref 96–112)
CHOLEST SERPL-MCNC: 198 MG/DL (ref 100–199)
CO2 SERPL-SCNC: 24 MMOL/L (ref 20–33)
CREAT SERPL-MCNC: 0.66 MG/DL (ref 0.5–1.4)
GLOBULIN SER CALC-MCNC: 3.2 G/DL (ref 1.9–3.5)
GLUCOSE SERPL-MCNC: 92 MG/DL (ref 65–99)
HDLC SERPL-MCNC: 52 MG/DL
LDLC SERPL CALC-MCNC: 130 MG/DL
POTASSIUM SERPL-SCNC: 4.3 MMOL/L (ref 3.6–5.5)
PROT SERPL-MCNC: 7 G/DL (ref 6–8.2)
SODIUM SERPL-SCNC: 142 MMOL/L (ref 135–145)
TRIGL SERPL-MCNC: 82 MG/DL (ref 0–149)

## 2019-05-09 PROCEDURE — 80061 LIPID PANEL: CPT

## 2019-05-09 PROCEDURE — 80053 COMPREHEN METABOLIC PANEL: CPT

## 2019-05-09 PROCEDURE — 83036 HEMOGLOBIN GLYCOSYLATED A1C: CPT

## 2019-05-09 PROCEDURE — 36415 COLL VENOUS BLD VENIPUNCTURE: CPT

## 2019-05-10 DIAGNOSIS — E78.00 PURE HYPERCHOLESTEROLEMIA: ICD-10-CM

## 2019-05-10 LAB
EST. AVERAGE GLUCOSE BLD GHB EST-MCNC: 126 MG/DL
HBA1C MFR BLD: 6 % (ref 0–5.6)

## 2019-05-10 RX ORDER — SIMVASTATIN 20 MG
20 TABLET ORAL EVERY EVENING
Qty: 30 TAB | Refills: 11 | Status: SHIPPED | OUTPATIENT
Start: 2019-05-10 | End: 2019-09-11

## 2019-05-20 ENCOUNTER — TELEPHONE (OUTPATIENT)
Dept: PHYSICAL THERAPY | Facility: REHABILITATION | Age: 69
End: 2019-05-20

## 2019-05-20 NOTE — LETTER
May 20, 2019    Kadie Mayers PA-C  [address]  [address]  [facility] [address]      Re:  Fior AHSAN Cory          Dear Dr. Mayers,    It was a pleasure seeing your patient, Fior Ray.     Please find enclosed a copy of the patient's discharge summary from outpatient physical therapy services.          On behalf of the staff at [facility], we would like to thank you for your referrals.  We appreciate your confidence in our clinic and will continue to work hard to provide the best outcomes for your patients.    We sincerely enjoy treating your patients and hope that you have been happy with their care.  Thank you again, and please call with any questions, concerns or ways that we can best meet your needs.        Sincerely,          Paola Garcia, PT, DPT    No Recipients              Outpatient Physical Therapy  DISCHARGE SUMMARY NOTE      [facility] [place] [address] [facility], [address]  [facility] [address]  Phone:  [phone]    Date of Visit: 05/20/2019    Patient: Fior Ray  YOB: 1950  MRN: [id number]     Referring Provider: Kadie Mayers   Referring Diagnosis Lymphedema, not elsewhere classified [I89.0]       Your patient is being discharged from Physical Therapy with the following comments:   · Goals partially met    Comments:  At this time, we no longer have a therapist that can provide lymphedema services. The patient has been informed that they will be discharged from our care.     Recommendations:  It was requested that they follow up with their primary care provider to determine current needs and options moving forward.    Paola Garcia PT, DPT    Date: 5/20/2019

## 2019-05-20 NOTE — OP THERAPY DISCHARGE SUMMARY
Outpatient Physical Therapy  DISCHARGE SUMMARY NOTE      [facility] [address], [address]  [place] [address]  Phone:  [phone]    Date of Visit: 05/20/2019    Patient: Fior Ray  YOB: 1950  MRN: [id number]     Referring Provider: Kadie Mayers   Referring Diagnosis Lymphedema, not elsewhere classified [I89.0]       Your patient is being discharged from Physical Therapy with the following comments:   · Goals partially met    Comments:  At this time, we no longer have a therapist that can provide lymphedema services. The patient has been informed that they will be discharged from our care.     Recommendations:  It was requested that they follow up with their primary care provider to determine current needs and options moving forward.    Paola Garcia, PT, DPT    Date: 5/20/2019

## 2019-08-08 ENCOUNTER — HOSPITAL ENCOUNTER (EMERGENCY)
Facility: MEDICAL CENTER | Age: 69
End: 2019-08-08
Attending: EMERGENCY MEDICINE
Payer: MEDICARE

## 2019-08-08 VITALS
TEMPERATURE: 97.9 F | BODY MASS INDEX: 45.99 KG/M2 | DIASTOLIC BLOOD PRESSURE: 78 MMHG | OXYGEN SATURATION: 95 % | WEIGHT: 269.4 LBS | HEIGHT: 64 IN | HEART RATE: 81 BPM | SYSTOLIC BLOOD PRESSURE: 115 MMHG | RESPIRATION RATE: 18 BRPM

## 2019-08-08 DIAGNOSIS — K59.00 CONSTIPATION, UNSPECIFIED CONSTIPATION TYPE: ICD-10-CM

## 2019-08-08 LAB
ALBUMIN SERPL BCP-MCNC: 3.8 G/DL (ref 3.2–4.9)
ALBUMIN/GLOB SERPL: 1.2 G/DL
ALP SERPL-CCNC: 76 U/L (ref 30–99)
ALT SERPL-CCNC: 9 U/L (ref 2–50)
ANION GAP SERPL CALC-SCNC: 8 MMOL/L (ref 0–11.9)
APPEARANCE UR: CLEAR
AST SERPL-CCNC: 12 U/L (ref 12–45)
BASOPHILS # BLD AUTO: 0.6 % (ref 0–1.8)
BASOPHILS # BLD: 0.05 K/UL (ref 0–0.12)
BILIRUB SERPL-MCNC: 0.9 MG/DL (ref 0.1–1.5)
BILIRUB UR QL STRIP.AUTO: NEGATIVE
BUN SERPL-MCNC: 8 MG/DL (ref 8–22)
CALCIUM SERPL-MCNC: 8.9 MG/DL (ref 8.5–10.5)
CHLORIDE SERPL-SCNC: 106 MMOL/L (ref 96–112)
CO2 SERPL-SCNC: 28 MMOL/L (ref 20–33)
COLOR UR: YELLOW
CREAT SERPL-MCNC: 0.7 MG/DL (ref 0.5–1.4)
EOSINOPHIL # BLD AUTO: 0.23 K/UL (ref 0–0.51)
EOSINOPHIL NFR BLD: 2.7 % (ref 0–6.9)
ERYTHROCYTE [DISTWIDTH] IN BLOOD BY AUTOMATED COUNT: 48.2 FL (ref 35.9–50)
GLOBULIN SER CALC-MCNC: 3.3 G/DL (ref 1.9–3.5)
GLUCOSE SERPL-MCNC: 102 MG/DL (ref 65–99)
GLUCOSE UR STRIP.AUTO-MCNC: NEGATIVE MG/DL
HCT VFR BLD AUTO: 42 % (ref 37–47)
HGB BLD-MCNC: 13.4 G/DL (ref 12–16)
IMM GRANULOCYTES # BLD AUTO: 0.02 K/UL (ref 0–0.11)
IMM GRANULOCYTES NFR BLD AUTO: 0.2 % (ref 0–0.9)
KETONES UR STRIP.AUTO-MCNC: NEGATIVE MG/DL
LEUKOCYTE ESTERASE UR QL STRIP.AUTO: NEGATIVE
LIPASE SERPL-CCNC: 8 U/L (ref 11–82)
LYMPHOCYTES # BLD AUTO: 2.79 K/UL (ref 1–4.8)
LYMPHOCYTES NFR BLD: 32.4 % (ref 22–41)
MCH RBC QN AUTO: 28.6 PG (ref 27–33)
MCHC RBC AUTO-ENTMCNC: 31.9 G/DL (ref 33.6–35)
MCV RBC AUTO: 89.7 FL (ref 81.4–97.8)
MICRO URNS: NORMAL
MONOCYTES # BLD AUTO: 0.78 K/UL (ref 0–0.85)
MONOCYTES NFR BLD AUTO: 9.1 % (ref 0–13.4)
NEUTROPHILS # BLD AUTO: 4.73 K/UL (ref 2–7.15)
NEUTROPHILS NFR BLD: 55 % (ref 44–72)
NITRITE UR QL STRIP.AUTO: NEGATIVE
NRBC # BLD AUTO: 0 K/UL
NRBC BLD-RTO: 0 /100 WBC
PH UR STRIP.AUTO: 5.5 [PH] (ref 5–8)
PLATELET # BLD AUTO: 230 K/UL (ref 164–446)
PMV BLD AUTO: 9 FL (ref 9–12.9)
POTASSIUM SERPL-SCNC: 4.2 MMOL/L (ref 3.6–5.5)
PROT SERPL-MCNC: 7.1 G/DL (ref 6–8.2)
PROT UR QL STRIP: NEGATIVE MG/DL
RBC # BLD AUTO: 4.68 M/UL (ref 4.2–5.4)
RBC UR QL AUTO: NEGATIVE
SODIUM SERPL-SCNC: 142 MMOL/L (ref 135–145)
SP GR UR STRIP.AUTO: 1.01
UROBILINOGEN UR STRIP.AUTO-MCNC: 0.2 MG/DL
WBC # BLD AUTO: 8.6 K/UL (ref 4.8–10.8)

## 2019-08-08 PROCEDURE — 700101 HCHG RX REV CODE 250: Performed by: EMERGENCY MEDICINE

## 2019-08-08 PROCEDURE — 99284 EMERGENCY DEPT VISIT MOD MDM: CPT

## 2019-08-08 PROCEDURE — 83690 ASSAY OF LIPASE: CPT

## 2019-08-08 PROCEDURE — 80053 COMPREHEN METABOLIC PANEL: CPT

## 2019-08-08 PROCEDURE — 81003 URINALYSIS AUTO W/O SCOPE: CPT

## 2019-08-08 PROCEDURE — 85025 COMPLETE CBC W/AUTO DIFF WBC: CPT

## 2019-08-08 RX ORDER — ENEMA 19; 7 G/133ML; G/133ML
19 ENEMA RECTAL ONCE
Status: DISCONTINUED | OUTPATIENT
Start: 2019-08-08 | End: 2019-08-08

## 2019-08-08 RX ORDER — POLYETHYLENE GLYCOL 3350 17 G/17G
17 POWDER, FOR SOLUTION ORAL DAILY
Qty: 30 EACH | Refills: 0 | Status: SHIPPED | OUTPATIENT
Start: 2019-08-08 | End: 2019-09-11

## 2019-08-08 RX ORDER — ENEMA 19; 7 G/133ML; G/133ML
1 ENEMA RECTAL ONCE
Status: DISCONTINUED | OUTPATIENT
Start: 2019-08-08 | End: 2019-08-08 | Stop reason: HOSPADM

## 2019-08-08 RX ADMIN — ENEMA 19 G: 19; 7 ENEMA RECTAL at 15:45

## 2019-08-08 NOTE — ED TRIAGE NOTES
Chief Complaint   Patient presents with   • Constipation     last bm was sunday   • Abdominal Pain     mid lower abdomen     Pt ambulated to triage with above complaints, she has been traveling . Pt concern because she has an IVC filter and pain located around area of the filter.

## 2019-08-08 NOTE — ED PROVIDER NOTES
ED Provider Note    ED Provider Note    Primary care provider: Sara Mei D.O.  Means of arrival: Walk-in  History obtained from: Patient    CHIEF COMPLAINT  Chief Complaint   Patient presents with   • Constipation     last bm was sunday   • Abdominal Pain     mid lower abdomen     Seen at 2:42 PM.   HPI  Fior Ray is a 69 y.o. female who presents to the Emergency Department with a chief complaint of constipation.  The patient has not had a bowel movement in 4 days.  She was on a prolonged road trip which she believes caused the issue.  Normally she ambulates and has a regular daily bowel movement.  Because of the prolonged immobility she feels that her regimen is now off.  She has tried multiple over-the-counter motility agents without any significant results.  She developed some mild diffuse abdominal cramping yesterday evening, she associates this with the inability to pass stools.  She does not have any nausea, vomiting, fevers, chills, chest pain, shortness of breath, cough, dysuria, incontinence, rash or impaired immunity.  She does have a history of IVC filter.  She is not on any anticoagulation at this time.    REVIEW OF SYSTEMS  See HPI,   Remainder of ROS negative.     PAST MEDICAL HISTORY   has a past medical history of Diverticulitis, Hypertension, Morbid obesity (HCC), Obesity, TOMI (obstructive sleep apnea), PE (pulmonary thromboembolism) (HCC), Venous insufficiency of both lower extremities, and VSD (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    SURGICAL HISTORY   has a past surgical history that includes knee arthroplasty total (Right, 2014); hysterectomy, vaginal; and vein stripping.    SOCIAL HISTORY  Social History     Tobacco Use   • Smoking status: Never Smoker   • Smokeless tobacco: Never Used   Substance Use Topics   • Alcohol use: No   • Drug use: No      Social History     Substance and Sexual Activity   Drug Use No       FAMILY HISTORY  Family History  "  Problem Relation Age of Onset   • Lung Disease Mother         COPD    • Heart Attack Father    • No Known Problems Sister    • No Known Problems Brother    • No Known Problems Maternal Grandmother    • No Known Problems Maternal Grandfather    • No Known Problems Paternal Grandmother    • Heart Attack Paternal Grandfather        CURRENT MEDICATIONS  Reviewed.  See Encounter Summary.     ALLERGIES  Allergies   Allergen Reactions   • Mustard [Allyl Isothiocyanate] Anaphylaxis   • Prochlorperazine Edisylate [Compazine] Anaphylaxis       PHYSICAL EXAM  VITAL SIGNS: /78   Pulse 81   Temp 36.6 °C (97.9 °F) (Temporal)   Resp 18   Ht 1.626 m (5' 4\")   Wt 122.2 kg (269 lb 6.4 oz)   SpO2 95%   BMI 46.24 kg/m²   Constitutional: Awake, alert in no apparent distress.  Pleasant.  Obese.  HENT: Normocephalic, Bilateral external ears normal. Nose normal.   Eyes: Conjunctiva normal, non-icteric, EOMI.    Thorax & Lungs: Easy unlabored respirations, Clear to ascultation bilaterally.  Cardiovascular: Regular rate, Regular rhythm, No murmurs, rubs or gallops.  Abdomen:  Soft, minimal diffuse abdominal tenderness, ventral hernia that is soft and reducible.  Nondistended, normal active bowel sounds.   Rectal exam: Moderate amount of firm stool in the rectal vault.    :    Skin: Visualized skin is  Dry, No erythema, No rash.   Musculoskeletal:   No cyanosis, clubbing or edema.  Neurologic: Alert, Grossly non-focal.   Psychiatric: Normal affect, Normal mood  Lymphatic:  No cervical LAD      RADIOLOGY  No orders to display         COURSE & MEDICAL DECISION MAKING  Pertinent Labs & Imaging studies reviewed. (See chart for details)        2:42 PM - Medical record reviewed, patient with history of DVT and pulmonary embolus, does have an IVC filter.      3:45 PM -the patient had a very large bowel movement after Fleet enema.  Decision Making:  This is a 69 y.o. year old female who presents with a chief complaint of " constipation.  The patient did have a large amount of stool in the rectal vault.  Abdominal examination was benign.  Labs were obtained at triage that are reassuring.  I was able to remove a fair amount stool from the rectal vault, following this she had a fleets enema that significantly decompressed the patient.  She noted improvement in her pain and is comfortable going home.  I will place her on MiraLAX for the next few weeks.  Given that her symptoms resolved I do not suspect an intra-abdominal disaster such as AAA or severe diverticulitis.  I do not feel that emergent imaging is indicated.  The patient should return for any severe abdominal pain, intractable vomiting, fevers or any other concern.    Discharge Medications:  Discharge Medication List as of 8/8/2019  3:46 PM      START taking these medications    Details   polyethylene glycol/lytes (MIRALAX) Pack Take 1 Packet by mouth every day., Disp-30 Each, R-0, Normal             The patient was discharged home (see d/c instructions) and parent was told to return immediately for any signs or symptoms listed, or any worsening at all.  The patient's parent verbally agreed to the discharge precautions and follow-up plan which is documented in EPIC.    The patient's blood pressure is elevated today. >120/80. I have referred them to primary care for follow up.       FINAL IMPRESSION  1. Constipation, unspecified constipation type

## 2019-08-08 NOTE — ED NOTES
Assisted Dr. Callejas bedside for rectal examination and disimpaction. Pt to require fleets enema.

## 2019-08-09 LAB
LV EJECT FRACT  99904: 70
LV EJECT FRACT MOD 2C 99903: 25.72
LV EJECT FRACT MOD 4C 99902: 70.17
LV EJECT FRACT MOD BP 99901: 51.28

## 2019-08-20 ENCOUNTER — TELEPHONE (OUTPATIENT)
Dept: MEDICAL GROUP | Facility: PHYSICIAN GROUP | Age: 69
End: 2019-08-20

## 2019-08-20 NOTE — TELEPHONE ENCOUNTER
Future Appointments       Provider Department Center    8/27/2019 1:30 PM Sara Mei D.O. [facility]        ESTABLISHED PATIENT PRE-VISIT PLANNING     Patient was NOT contacted to complete PVP.     Note: Patient will not be contacted if there is no indication to call.     1.  Reviewed notes from the last few office visits within the medical group: Yes    2.  If any orders were placed at last visit or intended to be done for this visit (i.e. 6 mos follow-up), do we have Results/Consult Notes?        •  Labs - Labs ordered, completed on 05/09/2019 & 08/08/2019 and results are in chart.   Note: If patient appointment is for lab review and patient did not complete labs, check with provider if OK to reschedule patient until labs completed.       •  Imaging - Imaging ordered, completed and results are in chart.       •  Referrals - No referrals were ordered at last office visit.    3. Is this appointment scheduled as a Hospital Follow-Up? No    4.  Immunizations were updated in [facility] using WebIZ?: Yes       •  Web Iz Recommendations: FLU, TDAP, VARICELLA (Chicken Pox)  and SHINGRIX (Shingles)    5.  Patient is due for the following Health Maintenance Topics:   Health Maintenance Due   Topic Date Due   • Annual Wellness Visit  1950   • HEPATITIS C SCREENING  1950   • IMM DTaP/Tdap/Td Vaccine (1 - Tdap) 03/23/1969   • COLONOSCOPY  03/23/2000   • IMM ZOSTER VACCINES (1 of 2) 03/23/2000   • BONE DENSITY  03/23/2015   • MAMMOGRAM  08/05/2019     6. Orders for overdue Health Maintenance topics pended in Pre-Charting? YES    7.  AHA (MDX) form printed for Provider? No, already completed    8.  Patient was informed to arrive 15 min prior to their scheduled appointment and bring in their medication bottles.

## 2019-08-27 ENCOUNTER — OFFICE VISIT (OUTPATIENT)
Dept: MEDICAL GROUP | Facility: PHYSICIAN GROUP | Age: 69
End: 2019-08-27
Payer: MEDICARE

## 2019-08-27 ENCOUNTER — APPOINTMENT (OUTPATIENT)
Dept: RADIOLOGY | Facility: IMAGING CENTER | Age: 69
End: 2019-08-27
Attending: FAMILY MEDICINE
Payer: MEDICARE

## 2019-08-27 ENCOUNTER — TELEPHONE (OUTPATIENT)
Dept: MEDICAL GROUP | Facility: PHYSICIAN GROUP | Age: 69
End: 2019-08-27

## 2019-08-27 VITALS
DIASTOLIC BLOOD PRESSURE: 74 MMHG | SYSTOLIC BLOOD PRESSURE: 122 MMHG | HEIGHT: 64 IN | TEMPERATURE: 97.9 F | WEIGHT: 270 LBS | OXYGEN SATURATION: 96 % | BODY MASS INDEX: 46.1 KG/M2 | HEART RATE: 76 BPM

## 2019-08-27 DIAGNOSIS — L03.115 CELLULITIS OF RIGHT LOWER EXTREMITY: ICD-10-CM

## 2019-08-27 DIAGNOSIS — I87.2 VENOUS STASIS DERMATITIS OF BOTH LOWER EXTREMITIES: ICD-10-CM

## 2019-08-27 DIAGNOSIS — K42.9 UMBILICAL HERNIA WITHOUT OBSTRUCTION AND WITHOUT GANGRENE: ICD-10-CM

## 2019-08-27 DIAGNOSIS — R92.8 ABNORMAL MAMMOGRAM: ICD-10-CM

## 2019-08-27 DIAGNOSIS — R05.9 COUGH: ICD-10-CM

## 2019-08-27 DIAGNOSIS — I10 ESSENTIAL HYPERTENSION: ICD-10-CM

## 2019-08-27 PROCEDURE — 99214 OFFICE O/P EST MOD 30 MIN: CPT | Performed by: FAMILY MEDICINE

## 2019-08-27 PROCEDURE — 71046 X-RAY EXAM CHEST 2 VIEWS: CPT | Mod: TC | Performed by: FAMILY MEDICINE

## 2019-08-27 RX ORDER — CEPHALEXIN 500 MG/1
500 CAPSULE ORAL 4 TIMES DAILY
Qty: 28 CAP | Refills: 0 | Status: SHIPPED | OUTPATIENT
Start: 2019-08-27 | End: 2019-08-27

## 2019-08-27 RX ORDER — CEPHALEXIN 500 MG/1
500 CAPSULE ORAL 4 TIMES DAILY
Qty: 28 CAP | Refills: 0 | Status: SHIPPED | OUTPATIENT
Start: 2019-08-27 | End: 2019-09-03

## 2019-08-27 RX ORDER — TRIAMCINOLONE ACETONIDE 1 MG/G
OINTMENT TOPICAL
Qty: 30 G | Refills: 5 | Status: SHIPPED | OUTPATIENT
Start: 2019-08-27 | End: 2019-09-11

## 2019-08-27 RX ORDER — LISINOPRIL 30 MG/1
30 TABLET ORAL DAILY
Qty: 90 TAB | Refills: 1 | Status: SHIPPED | OUTPATIENT
Start: 2019-08-27 | End: 2019-11-14 | Stop reason: SDUPTHER

## 2019-08-27 NOTE — TELEPHONE ENCOUNTER
----- Message from Sara Mei D.O. sent at 8/27/2019  2:25 PM PDT -----  Please call patient let her know that her chest x-ray was normal.

## 2019-08-27 NOTE — PROGRESS NOTES
CC: Hernia    HISTORY OF THE PRESENT ILLNESS: Patient is a 69 y.o. female. This pleasant patient is here today to discuss following health issues.    Venous stasis, skin infection: Patient has severe venous stasis.  She has seen vascular specialist who was told her that there is essentially nothing they can do at this point.  Compression hose are not made large enough for her.  She does try to elevate her feet as much as she can.  She has chronic scaling, dryness and pain of her lower extremity's bilaterally.  She recently had a skin infection town and was treated with an antibiotic for this.  She feels that she may have some infection coming back on her right lower extremity as her leg has gotten slightly more red and warm to touch.  She otherwise has no fevers chills or flulike symptoms.    Cough:  is present and brings this up in office today.  He states that she is been coughing for a couple of months now.  She has no cardiac history, but does endorse that the cough seems to be worse at night and when laying down.  As above, she has significant lower extremity edema which is chronic for her.  Her weight is actually down from her previous visit.  She is not coughing anything up, his cough is very dry.  She has no shortness of breath or wheezing.  She has never been a smoker, but does have significant secondhand smoke exposure through her .  She does have a history of a PE but has an IVC filter in place.    Hypertension: Chronic issue for the patient.  She takes lisinopril and hydrochlorothiazide.  She is requesting refills on the lisinopril today.  Blood pressures well controlled.    Umbilical hernia: This is been an ongoing issue for the patient.  She reports that it used to be a lot smaller, but is actually increasing in size significantly.  She states that the area around her bellybutton is getting somewhat tender as well.  She has recently experienced significant constipation, but this did  improve.    Allergies: Mustard [allyl isothiocyanate] and Prochlorperazine edisylate [compazine]    Current Outpatient Medications Ordered in Epic   Medication Sig Dispense Refill   • triamcinolone acetonide (KENALOG) 0.1 % Ointment Apply to affected area twice daily. 30 g 5   • cephALEXin (KEFLEX) 500 MG Cap Take 1 Cap by mouth 4 times a day for 7 days. 28 Cap 0   • lisinopril (PRINIVIL, ZESTRIL) 30 MG tablet Take 1 Tab by mouth every day. 90 Tab 1   • polyethylene glycol/lytes (MIRALAX) Pack Take 1 Packet by mouth every day. 30 Each 0   • simvastatin (ZOCOR) 20 MG Tab Take 1 Tab by mouth every evening. 30 Tab 11   • hydroCHLOROthiazide (HYDRODIURIL) 25 MG Tab Take 1 Tab by mouth every day. 90 Tab 1     No current Epic-ordered facility-administered medications on file.        Past Medical History:   Diagnosis Date   • Diverticulitis    • Hypertension    • Morbid obesity (HCC)    • Obesity    • TOMI (obstructive sleep apnea)    • PE (pulmonary thromboembolism) ([facility])    • Venous insufficiency of both lower extremities    • VSD (ventricular septal defect and aortic arch hypoplasia        Past Surgical History:   Procedure Laterality Date   • KNEE ARTHROPLASTY TOTAL Right 2014   • HYSTERECTOMY, VAGINAL     • VEIN STRIPPING         Social History     Tobacco Use   • Smoking status: Never Smoker   • Smokeless tobacco: Never Used   Substance Use Topics   • Alcohol use: No   • Drug use: No       Social History     Social History Narrative   • Not on file       Family History   Problem Relation Age of Onset   • Lung Disease Mother         COPD    • Heart Attack Father    • No Known Problems Sister    • No Known Problems Brother    • No Known Problems Maternal Grandmother    • No Known Problems Maternal Grandfather    • No Known Problems Paternal Grandmother    • Heart Attack Paternal Grandfather        ROS:     - Constitutional: Negative for fever, chills, unexpected weight change, and fatigue/generalized weakness.     -  "Respiratory: Positive for cough.  Negative for  sputum production, chest congestion, dyspnea, wheezing, and crackles.      - Cardiovascular: Negative for chest pain, palpitations.    - Gastrointestinal: Negative for heartburn, nausea, vomiting, abdominal pain, hematochezia, melena, diarrhea, constipation, and greasy/foul-smelling stools.       Exam: /74 (BP Location: Left arm, Patient Position: Sitting, BP Cuff Size: Large adult)   Pulse 76   Temp 36.6 °C (97.9 °F) (Temporal)   Ht 1.626 m (5' 4\")   Wt 122.5 kg (270 lb)   SpO2 96%  Body mass index is 46.35 kg/m².    General: Well appearing, NAD  Pulmonary: Clear to ausculation.  Normal effort. No rales, ronchi, or wheezing.  Cardiovascular: Regular rate and rhythm without murmur, rubs or gallop.  Dense 3-4+ pitting edema to knees bilaterally.  Abdomen: Large ventral hernia noted, with some mild tenderness to palpation.  Reducible.  Otherwise abdomen soft,  nondistended. Normal bowel sounds. Liver and spleen are not palpable. No rebound or guarding  Skin: Warm and dry.  Severe chronic venous stasis changes noted on lower extremities bilaterally.  Right lower extremity with some warmth and erythema medially extending from medial malleoli about [facility] up the calf.  Psych: Normal mood and affect. Alert and oriented. Judgment and insight is normal.      Please note that this dictation was created using voice recognition software. I have made every reasonable attempt to correct obvious errors, but I expect that there are errors of grammar and possibly content that I did not discover before finalizing the note.      Assessment/Plan  Fior was seen today for hospital follow-up.    Diagnoses and all orders for this visit:    Cellulitis of right lower extremity  New uncontrolled issue for the patient in the setting of severe venous stasis dermatitis as below.  We will go ahead and do Keflex for 7 days.  Red flags reviewed including fevers, chills or rapidly " worsening redness.  -     cephALEXin (KEFLEX) 500 MG Cap; Take 1 Cap by mouth 4 times a day for 7 days.    Venous stasis dermatitis of both lower extremities  Chronic uncontrolled issue for the patient.  I am concerned that recurrent cellulitis will become a problem for her, she is already had 2 episodes in the past couple of months.  We will go ahead and trial steroid cream for her stasis dermatitis.  She has already seen vascular surgery.  -     triamcinolone acetonide (KENALOG) 0.1 % Ointment; Apply to affected area twice daily.    Cough  New issue for the patient.  Chest x-ray ordered today and was normal.  Given her complaints of orthopnea and cough when laying down, we will go ahead and obtain echo as well.  -     DX-CHEST-2 VIEWS; Future  -     EC-ECHOCARDIOGRAM COMPLETE W/O CONT; Future    Abnormal mammogram  Previous issue for the patient 6 months ago.  Six-month follow-up was recommended which has been ordered today.  -     MA-MAMMO DIAGNOSTIC BILAT W/CELENA W/CAD; Future    Umbilical hernia without obstruction and without gangrene  New uncontrolled issue for the patient.  We will go ahead and refer to general surgery for further evaluation.  -     REFERRAL TO GENERAL SURGERY    Essential hypertension  Chronic well-controlled issue for the patient.  Lisinopril refilled today.  -     lisinopril (PRINIVIL, ZESTRIL) 30 MG tablet; Take 1 Tab by mouth every day.      Follow-up in 3 months or sooner if needed.    Sara Mei DO  [facility]

## 2019-09-03 ENCOUNTER — HOSPITAL ENCOUNTER (OUTPATIENT)
Dept: RADIOLOGY | Facility: MEDICAL CENTER | Age: 69
End: 2019-09-03
Attending: FAMILY MEDICINE
Payer: MEDICARE

## 2019-09-03 DIAGNOSIS — R92.8 ABNORMAL MAMMOGRAM: ICD-10-CM

## 2019-09-03 PROCEDURE — G0279 TOMOSYNTHESIS, MAMMO: HCPCS | Mod: RT

## 2019-09-11 DIAGNOSIS — Z01.812 PRE-OPERATIVE LABORATORY EXAMINATION: ICD-10-CM

## 2019-09-11 DIAGNOSIS — Z01.810 PRE-OPERATIVE CARDIOVASCULAR EXAMINATION: ICD-10-CM

## 2019-09-11 LAB
ANION GAP SERPL CALC-SCNC: 11 MMOL/L (ref 0–11.9)
BUN SERPL-MCNC: 9 MG/DL (ref 8–22)
CALCIUM SERPL-MCNC: 9.3 MG/DL (ref 8.5–10.5)
CHLORIDE SERPL-SCNC: 103 MMOL/L (ref 96–112)
CO2 SERPL-SCNC: 25 MMOL/L (ref 20–33)
CREAT SERPL-MCNC: 0.73 MG/DL (ref 0.5–1.4)
EKG IMPRESSION: NORMAL
GLUCOSE SERPL-MCNC: 99 MG/DL (ref 65–99)
POTASSIUM SERPL-SCNC: 4.2 MMOL/L (ref 3.6–5.5)
SODIUM SERPL-SCNC: 139 MMOL/L (ref 135–145)

## 2019-09-11 PROCEDURE — 93010 ELECTROCARDIOGRAM REPORT: CPT | Performed by: INTERNAL MEDICINE

## 2019-09-11 PROCEDURE — 80048 BASIC METABOLIC PNL TOTAL CA: CPT

## 2019-09-11 PROCEDURE — 93005 ELECTROCARDIOGRAM TRACING: CPT | Performed by: SURGERY

## 2019-09-16 ENCOUNTER — ANESTHESIA EVENT (OUTPATIENT)
Dept: SURGERY | Facility: MEDICAL CENTER | Age: 69
End: 2019-09-16
Payer: MEDICARE

## 2019-09-17 ENCOUNTER — HOSPITAL ENCOUNTER (OUTPATIENT)
Facility: MEDICAL CENTER | Age: 69
End: 2019-09-17
Attending: SURGERY | Admitting: SURGERY
Payer: MEDICARE

## 2019-09-17 ENCOUNTER — ANESTHESIA (OUTPATIENT)
Dept: SURGERY | Facility: MEDICAL CENTER | Age: 69
End: 2019-09-17
Payer: MEDICARE

## 2019-09-17 VITALS
OXYGEN SATURATION: 92 % | SYSTOLIC BLOOD PRESSURE: 132 MMHG | WEIGHT: 266.1 LBS | HEART RATE: 71 BPM | BODY MASS INDEX: 45.43 KG/M2 | RESPIRATION RATE: 20 BRPM | DIASTOLIC BLOOD PRESSURE: 81 MMHG | TEMPERATURE: 98.3 F | HEIGHT: 64 IN

## 2019-09-17 DIAGNOSIS — G89.18 ACUTE POSTOPERATIVE PAIN: ICD-10-CM

## 2019-09-17 DIAGNOSIS — K43.9 VENTRAL HERNIA WITHOUT OBSTRUCTION OR GANGRENE: ICD-10-CM

## 2019-09-17 LAB — PATHOLOGY CONSULT NOTE: NORMAL

## 2019-09-17 PROCEDURE — 160046 HCHG PACU - 1ST 60 MINS PHASE II: Performed by: SURGERY

## 2019-09-17 PROCEDURE — 160009 HCHG ANES TIME/MIN: Performed by: SURGERY

## 2019-09-17 PROCEDURE — 160027 HCHG SURGERY MINUTES - 1ST 30 MINS LEVEL 2: Performed by: SURGERY

## 2019-09-17 PROCEDURE — 700111 HCHG RX REV CODE 636 W/ 250 OVERRIDE (IP): Performed by: ANESTHESIOLOGY

## 2019-09-17 PROCEDURE — 700105 HCHG RX REV CODE 258: Performed by: SURGERY

## 2019-09-17 PROCEDURE — 160035 HCHG PACU - 1ST 60 MINS PHASE I: Performed by: SURGERY

## 2019-09-17 PROCEDURE — 160047 HCHG PACU  - EA ADDL 30 MINS PHASE II: Performed by: SURGERY

## 2019-09-17 PROCEDURE — 500002 HCHG ADHESIVE, DERMABOND: Performed by: SURGERY

## 2019-09-17 PROCEDURE — 700101 HCHG RX REV CODE 250: Performed by: SURGERY

## 2019-09-17 PROCEDURE — 160048 HCHG OR STATISTICAL LEVEL 1-5: Performed by: SURGERY

## 2019-09-17 PROCEDURE — 502240 HCHG MISC OR SUPPLY RC 0272: Performed by: SURGERY

## 2019-09-17 PROCEDURE — 700102 HCHG RX REV CODE 250 W/ 637 OVERRIDE(OP): Performed by: ANESTHESIOLOGY

## 2019-09-17 PROCEDURE — 88300 SURGICAL PATH GROSS: CPT

## 2019-09-17 PROCEDURE — 160002 HCHG RECOVERY MINUTES (STAT): Performed by: SURGERY

## 2019-09-17 PROCEDURE — 160038 HCHG SURGERY MINUTES - EA ADDL 1 MIN LEVEL 2: Performed by: SURGERY

## 2019-09-17 PROCEDURE — 160036 HCHG PACU - EA ADDL 30 MINS PHASE I: Performed by: SURGERY

## 2019-09-17 PROCEDURE — 160025 RECOVERY II MINUTES (STATS): Performed by: SURGERY

## 2019-09-17 PROCEDURE — 501838 HCHG SUTURE GENERAL: Performed by: SURGERY

## 2019-09-17 PROCEDURE — 700101 HCHG RX REV CODE 250: Performed by: ANESTHESIOLOGY

## 2019-09-17 PROCEDURE — A9270 NON-COVERED ITEM OR SERVICE: HCPCS | Performed by: ANESTHESIOLOGY

## 2019-09-17 PROCEDURE — C1781 MESH (IMPLANTABLE): HCPCS | Performed by: SURGERY

## 2019-09-17 DEVICE — MESH VENTRALEX ST W/STRAP - 8.0CM LARGE (1EA/CA): Type: IMPLANTABLE DEVICE | Status: FUNCTIONAL

## 2019-09-17 RX ORDER — DIPHENHYDRAMINE HYDROCHLORIDE 50 MG/ML
12.5 INJECTION INTRAMUSCULAR; INTRAVENOUS
Status: DISCONTINUED | OUTPATIENT
Start: 2019-09-17 | End: 2019-09-17 | Stop reason: HOSPADM

## 2019-09-17 RX ORDER — SODIUM CHLORIDE, SODIUM LACTATE, POTASSIUM CHLORIDE, CALCIUM CHLORIDE 600; 310; 30; 20 MG/100ML; MG/100ML; MG/100ML; MG/100ML
INJECTION, SOLUTION INTRAVENOUS CONTINUOUS
Status: DISCONTINUED | OUTPATIENT
Start: 2019-09-17 | End: 2019-09-17 | Stop reason: HOSPADM

## 2019-09-17 RX ORDER — ROCURONIUM BROMIDE 10 MG/ML
INJECTION, SOLUTION INTRAVENOUS PRN
Status: DISCONTINUED | OUTPATIENT
Start: 2019-09-17 | End: 2019-09-17 | Stop reason: SURG

## 2019-09-17 RX ORDER — METOPROLOL TARTRATE 1 MG/ML
1 INJECTION, SOLUTION INTRAVENOUS
Status: DISCONTINUED | OUTPATIENT
Start: 2019-09-17 | End: 2019-09-17 | Stop reason: HOSPADM

## 2019-09-17 RX ORDER — MORPHINE SULFATE 4 MG/ML
2 INJECTION, SOLUTION INTRAMUSCULAR; INTRAVENOUS
Status: DISCONTINUED | OUTPATIENT
Start: 2019-09-17 | End: 2019-09-17 | Stop reason: HOSPADM

## 2019-09-17 RX ORDER — GABAPENTIN 300 MG/1
300 CAPSULE ORAL ONCE
Status: COMPLETED | OUTPATIENT
Start: 2019-09-17 | End: 2019-09-17

## 2019-09-17 RX ORDER — METOCLOPRAMIDE HYDROCHLORIDE 5 MG/ML
INJECTION INTRAMUSCULAR; INTRAVENOUS PRN
Status: DISCONTINUED | OUTPATIENT
Start: 2019-09-17 | End: 2019-09-17 | Stop reason: SURG

## 2019-09-17 RX ORDER — HYDRALAZINE HYDROCHLORIDE 20 MG/ML
5 INJECTION INTRAMUSCULAR; INTRAVENOUS
Status: DISCONTINUED | OUTPATIENT
Start: 2019-09-17 | End: 2019-09-17 | Stop reason: HOSPADM

## 2019-09-17 RX ORDER — BUPIVACAINE HYDROCHLORIDE AND EPINEPHRINE 5; 5 MG/ML; UG/ML
INJECTION, SOLUTION EPIDURAL; INTRACAUDAL; PERINEURAL
Status: DISCONTINUED | OUTPATIENT
Start: 2019-09-17 | End: 2019-09-17 | Stop reason: HOSPADM

## 2019-09-17 RX ORDER — OXYCODONE HYDROCHLORIDE 5 MG/1
5-10 TABLET ORAL EVERY 4 HOURS PRN
Qty: 40 TAB | Refills: 0 | Status: SHIPPED | OUTPATIENT
Start: 2019-09-17 | End: 2019-09-24

## 2019-09-17 RX ORDER — ONDANSETRON 2 MG/ML
INJECTION INTRAMUSCULAR; INTRAVENOUS PRN
Status: DISCONTINUED | OUTPATIENT
Start: 2019-09-17 | End: 2019-09-17 | Stop reason: SURG

## 2019-09-17 RX ORDER — HYDRALAZINE HYDROCHLORIDE 20 MG/ML
INJECTION INTRAMUSCULAR; INTRAVENOUS PRN
Status: DISCONTINUED | OUTPATIENT
Start: 2019-09-17 | End: 2019-09-17 | Stop reason: SURG

## 2019-09-17 RX ORDER — DIPHENHYDRAMINE HYDROCHLORIDE 50 MG/ML
INJECTION INTRAMUSCULAR; INTRAVENOUS PRN
Status: DISCONTINUED | OUTPATIENT
Start: 2019-09-17 | End: 2019-09-17 | Stop reason: SURG

## 2019-09-17 RX ORDER — MORPHINE SULFATE 10 MG/ML
5 INJECTION, SOLUTION INTRAMUSCULAR; INTRAVENOUS
Status: DISCONTINUED | OUTPATIENT
Start: 2019-09-17 | End: 2019-09-17 | Stop reason: HOSPADM

## 2019-09-17 RX ORDER — CEFAZOLIN SODIUM 1 G/3ML
INJECTION, POWDER, FOR SOLUTION INTRAMUSCULAR; INTRAVENOUS PRN
Status: DISCONTINUED | OUTPATIENT
Start: 2019-09-17 | End: 2019-09-17 | Stop reason: SURG

## 2019-09-17 RX ORDER — MIDAZOLAM HYDROCHLORIDE 1 MG/ML
INJECTION INTRAMUSCULAR; INTRAVENOUS PRN
Status: DISCONTINUED | OUTPATIENT
Start: 2019-09-17 | End: 2019-09-17 | Stop reason: SURG

## 2019-09-17 RX ORDER — OXYCODONE HCL 5 MG/5 ML
5 SOLUTION, ORAL ORAL
Status: COMPLETED | OUTPATIENT
Start: 2019-09-17 | End: 2019-09-17

## 2019-09-17 RX ORDER — LABETALOL HYDROCHLORIDE 5 MG/ML
INJECTION, SOLUTION INTRAVENOUS PRN
Status: DISCONTINUED | OUTPATIENT
Start: 2019-09-17 | End: 2019-09-17 | Stop reason: SURG

## 2019-09-17 RX ORDER — BUPIVACAINE HYDROCHLORIDE AND EPINEPHRINE 5; 5 MG/ML; UG/ML
INJECTION, SOLUTION EPIDURAL; INTRACAUDAL; PERINEURAL
Status: DISCONTINUED
Start: 2019-09-17 | End: 2019-09-17 | Stop reason: HOSPADM

## 2019-09-17 RX ORDER — LIDOCAINE HYDROCHLORIDE 40 MG/ML
SOLUTION TOPICAL PRN
Status: DISCONTINUED | OUTPATIENT
Start: 2019-09-17 | End: 2019-09-17 | Stop reason: SURG

## 2019-09-17 RX ORDER — LIDOCAINE HYDROCHLORIDE 20 MG/ML
INJECTION, SOLUTION EPIDURAL; INFILTRATION; INTRACAUDAL; PERINEURAL PRN
Status: DISCONTINUED | OUTPATIENT
Start: 2019-09-17 | End: 2019-09-17 | Stop reason: SURG

## 2019-09-17 RX ORDER — CEFAZOLIN SODIUM 1 G/3ML
INJECTION, POWDER, FOR SOLUTION INTRAMUSCULAR; INTRAVENOUS PRN
Status: DISCONTINUED | OUTPATIENT
Start: 2019-09-17 | End: 2019-09-17

## 2019-09-17 RX ORDER — OXYCODONE HCL 5 MG/5 ML
10 SOLUTION, ORAL ORAL
Status: COMPLETED | OUTPATIENT
Start: 2019-09-17 | End: 2019-09-17

## 2019-09-17 RX ORDER — ONDANSETRON 2 MG/ML
4 INJECTION INTRAMUSCULAR; INTRAVENOUS
Status: DISCONTINUED | OUTPATIENT
Start: 2019-09-17 | End: 2019-09-17 | Stop reason: HOSPADM

## 2019-09-17 RX ORDER — MORPHINE SULFATE 4 MG/ML
1 INJECTION, SOLUTION INTRAMUSCULAR; INTRAVENOUS
Status: DISCONTINUED | OUTPATIENT
Start: 2019-09-17 | End: 2019-09-17 | Stop reason: HOSPADM

## 2019-09-17 RX ORDER — ACETAMINOPHEN 500 MG
1000 TABLET ORAL ONCE
Status: COMPLETED | OUTPATIENT
Start: 2019-09-17 | End: 2019-09-17

## 2019-09-17 RX ORDER — HALOPERIDOL 5 MG/ML
1 INJECTION INTRAMUSCULAR
Status: DISCONTINUED | OUTPATIENT
Start: 2019-09-17 | End: 2019-09-17 | Stop reason: HOSPADM

## 2019-09-17 RX ORDER — DEXAMETHASONE SODIUM PHOSPHATE 4 MG/ML
INJECTION, SOLUTION INTRA-ARTICULAR; INTRALESIONAL; INTRAMUSCULAR; INTRAVENOUS; SOFT TISSUE PRN
Status: DISCONTINUED | OUTPATIENT
Start: 2019-09-17 | End: 2019-09-17 | Stop reason: SURG

## 2019-09-17 RX ADMIN — FENTANYL CITRATE 50 MCG: 50 INJECTION INTRAMUSCULAR; INTRAVENOUS at 11:29

## 2019-09-17 RX ADMIN — SUGAMMADEX 200 MG: 100 INJECTION, SOLUTION INTRAVENOUS at 10:38

## 2019-09-17 RX ADMIN — ONDANSETRON 4 MG: 2 INJECTION INTRAMUSCULAR; INTRAVENOUS at 10:29

## 2019-09-17 RX ADMIN — OXYCODONE HYDROCHLORIDE 10 MG: 5 SOLUTION ORAL at 11:27

## 2019-09-17 RX ADMIN — PROPOFOL 30 MG: 10 INJECTION, EMULSION INTRAVENOUS at 09:55

## 2019-09-17 RX ADMIN — FENTANYL CITRATE 50 MCG: 50 INJECTION, SOLUTION INTRAMUSCULAR; INTRAVENOUS at 09:24

## 2019-09-17 RX ADMIN — DIPHENHYDRAMINE HYDROCHLORIDE 25 MG: 50 INJECTION, SOLUTION INTRAMUSCULAR; INTRAVENOUS at 09:26

## 2019-09-17 RX ADMIN — PROPOFOL 170 MG: 10 INJECTION, EMULSION INTRAVENOUS at 09:23

## 2019-09-17 RX ADMIN — PROPOFOL 50 MG: 10 INJECTION, EMULSION INTRAVENOUS at 10:11

## 2019-09-17 RX ADMIN — LIDOCAINE HYDROCHLORIDE 80 MG: 20 INJECTION, SOLUTION EPIDURAL; INFILTRATION; INTRACAUDAL at 09:23

## 2019-09-17 RX ADMIN — LIDOCAINE HYDROCHLORIDE 160 MG: 40 SOLUTION TOPICAL at 09:23

## 2019-09-17 RX ADMIN — SODIUM CHLORIDE, POTASSIUM CHLORIDE, SODIUM LACTATE AND CALCIUM CHLORIDE: 600; 310; 30; 20 INJECTION, SOLUTION INTRAVENOUS at 09:10

## 2019-09-17 RX ADMIN — FENTANYL CITRATE 50 MCG: 50 INJECTION, SOLUTION INTRAMUSCULAR; INTRAVENOUS at 09:46

## 2019-09-17 RX ADMIN — EPHEDRINE SULFATE 10 MG: 50 INJECTION, SOLUTION INTRAVENOUS at 09:31

## 2019-09-17 RX ADMIN — FENTANYL CITRATE 50 MCG: 50 INJECTION INTRAMUSCULAR; INTRAVENOUS at 11:07

## 2019-09-17 RX ADMIN — MIDAZOLAM 2 MG: 1 INJECTION INTRAMUSCULAR; INTRAVENOUS at 09:23

## 2019-09-17 RX ADMIN — DEXAMETHASONE SODIUM PHOSPHATE 8 MG: 4 INJECTION, SOLUTION INTRA-ARTICULAR; INTRALESIONAL; INTRAMUSCULAR; INTRAVENOUS; SOFT TISSUE at 09:23

## 2019-09-17 RX ADMIN — CEFAZOLIN 3 G: 330 INJECTION, POWDER, FOR SOLUTION INTRAMUSCULAR; INTRAVENOUS at 09:26

## 2019-09-17 RX ADMIN — ACETAMINOPHEN 1000 MG: 500 TABLET ORAL at 08:31

## 2019-09-17 RX ADMIN — EPHEDRINE SULFATE 10 MG: 50 INJECTION, SOLUTION INTRAVENOUS at 09:29

## 2019-09-17 RX ADMIN — METOCLOPRAMIDE 10 MG: 5 INJECTION, SOLUTION INTRAMUSCULAR; INTRAVENOUS at 09:26

## 2019-09-17 RX ADMIN — HYDRALAZINE HYDROCHLORIDE 5 MG: 20 INJECTION INTRAMUSCULAR; INTRAVENOUS at 10:38

## 2019-09-17 RX ADMIN — ROCURONIUM BROMIDE 45 MG: 10 INJECTION, SOLUTION INTRAVENOUS at 09:23

## 2019-09-17 RX ADMIN — FENTANYL CITRATE 100 MCG: 50 INJECTION, SOLUTION INTRAMUSCULAR; INTRAVENOUS at 09:57

## 2019-09-17 RX ADMIN — LABETALOL HYDROCHLORIDE 5 MG: 5 INJECTION, SOLUTION INTRAVENOUS at 10:24

## 2019-09-17 RX ADMIN — LABETALOL HYDROCHLORIDE 5 MG: 5 INJECTION, SOLUTION INTRAVENOUS at 10:15

## 2019-09-17 RX ADMIN — GABAPENTIN 300 MG: 300 CAPSULE ORAL at 08:32

## 2019-09-17 ASSESSMENT — PAIN SCALES - GENERAL: PAIN_LEVEL: 5

## 2019-09-17 NOTE — OR NURSING
1049 Pt received to PACU with report from .  Respirations even and unlabored. Surgical site is CDI with dermabond midline abd.  Pt denies pain or nausea at this time.   1110 Pt complains of pain 9/10, medication provided.   1117 Report given to Maria Del Rosario LAGUNA RN.

## 2019-09-17 NOTE — ANESTHESIA QCDR
2019 Qualified Clinical Data Registry (for Quality Improvement)     Postoperative nausea/vomiting risk protocol (Adult = 18 yrs and Pediatric 3-17 yrs)- (430 and 463)  General inhalation anesthetic (NOT TIVA) with PONV risk factors: Yes  Provision of anti-emetic therapy with at least 2 different classes of agents: Yes   Patient DID NOT receive anti-emetic therapy and reason is documented in Medical Record:  N/A    Multimodal Pain Management- (AQI59)  Patient undergoing Elective Surgery (i.e. Outpatient, or ASC, or Prescheduled Surgery prior to Hospital Admission): Yes  Use of Multimodal Pain Management, two or more drugs and/or interventions, NOT including systemic opioids: Yes   Exception: Documented allergy to multiple classes of analgesics:  N/A    PACU assessment of acute postoperative pain prior to Anesthesia Care End- Applies to Patients Age = 18- (ABG7)  Initial PACU pain score is which of the following: < 7/10  Patient unable to report pain score: N/A    Post-anesthetic transfer of care checklist/protocol to PACU/ICU- (426 and 427)  Upon conclusion of case, patient transferred to which of the following locations: PACU/Non-ICU  Use of transfer checklist/protocol: Yes  Exclusion: Service Performed in Patient Hospital Room (and thus did not require transfer): N/A    PACU Reintubation- (AQI31)  General anesthesia requiring endotracheal intubation (ETT) along with subsequent extubation in OR or PACU:   Required reintubation in the PACU:   Extubation was a planned trial documented in the medical record prior to removal of the original airway device:     Unplanned admission to ICU related to anesthesia service up through end of PACU care- (MD51)  Unplanned admission to ICU (not initially anticipated at anesthesia start time):

## 2019-09-17 NOTE — OR NURSING
1130 assumed care of pt she is c/o pain still 9/10 scale although is sleepy easily arousable but quickly falls asleep and appears very comfortable medicated per oders with iv and po pain meds see mar   1205 pt states pain now a 5 and is much improved spouse to bedside mask to nasal cannula at 3 liters sats 92% -93%   1248- pain controlled remains sleepy with freq dozing tolerating sips of water well.   1310- needing o2 still drops to 95% on roomair while sleeping   92% on 1 liter nc   1322 up to bathroom able to void successfully dressed in bathroom and placed into a recliner chair   1337 report to Paulette hoang for break qualifies for stage 2 transferred   1430 feeling more awake and 92% on roomair will cont to monitor sats   1510- sats greater than 92% on roomair and feels ready to dc to home meets all other dc criteria reviewed all dc instructions and iv removed dc to home in wheelchair with all belongings accomp with spouse

## 2019-09-17 NOTE — OP REPORT
DATE OF SERVICE:  09/17/2019    PREOPERATIVE DIAGNOSIS:  Symptomatic incarcerated ventral hernia.    POSTOPERATIVE DIAGNOSIS:  Symptomatic incarcerated ventral hernia.    PROCEDURE PERFORMED:  Incarcerated ventral hernia repair with mesh.    PRIMARY SURGEON:  Truong Daniels MD    ASSISTANT:  TASNEEM Maria    ANESTHESIOLOGIST:  Jo Ann Malik MD    ANESTHESIA:  General endotracheal.    ESTIMATED BLOOD LOSS:  25 mL.    COMPLICATIONS:  None immediately apparent.    SPECIMENS:  Hernia sac.    OPERATIVE FINDINGS:  Patient had an approximately 3 cm diameter ventral defect   that contained incarcerated transverse colon.    DESCRIPTION OF PROCEDURE:  The patient was taken back to the operating room   and placed in the supine position.  General anesthesia was induced and the   patient was intubated.  Bilateral SCD devices were placed.  Appropriate IV   antibiotics were given.  All bony prominences were carefully protected.  The   abdomen was then widely prepped and draped in a sterile fashion.  A time-out   was performed.  The patient and procedure both verified.    An 8 cm curvilinear incision was made in the infraumbilical location.    Subcutaneous tissues were divided down to the level of the fascia using a   combination of electrocautery and blunt dissection.  The patient had a   significant amount of subcutaneous fat approximately 6-8 inches worth.  Once   the base of the umbilicus was identified, it was noted to be quite attenuated.    I then carefully meticulously dissected around the umbilical stalk   circumferentially.  I then created a counterincision connected to my previous   curvilinear incision in the supraumbilical location.  I was then able to   elevate the umbilicus with a hernia and removed the patient's umbilicus using   a combination of electrocautery and sharp dissection.  I had discussed with   the patient preoperatively, if the base of the umbilicus was too attenuated   that I would  remove it and this was certainly the case.  This umbilicus and   skin was discarded.  I then opened the hernia sac, I was able to identify the   contents, which were transverse colon.  I then carefully meticulously   dissected the transverse colon free from the hernia sac using a combination of   blunt dissection and sharp dissection.  In order to reduce the transverse   colon back into the peritoneal cavity, I had to release the fascia inferiorly   for approximately 1 cm with the transverse colon appropriately reduced.  I   then sprayed 3 g of Ladan hemostatic agent over it to assist with hemostasis.    The overall resultant defect in the patient's midline was 4 cm in diameter   total.  I elevated the hernia sac and excised it at the level of the fascia   and handed off as specimen.  An 8 cm diameter circular Ventralex ST mesh was   then used for the repair.  This was placed into the peritoneal cavity and   applied to the posterior aspect of the abdominal wall using circumferential 0   Ethibond sutures.  The patient's native midline fascial defect was then   approximated with interrupted 0 Ethibond sutures as well.  Once tied down, the   fascia was noted to be tight and well approximated.  All operative fields   were then infiltrated with 30 mL of 0.5% Marcaine with epinephrine.  I then   irrigated the wound with 500 mL of warm saline.  I then evaluated for   hemostasis, which was achieved with electrocautery where needed.  I then   performed multiple layer closure of the subcutaneous tissue from deep to   superficial using interrupted 3-0 Vicryl sutures were needed, 3-0 Vicryl deep   dermal sutures were then placed to approximate the skin.  The skin was then   closed with a running 4-0 Monocryl subcuticular suture.  The area was cleaned   and Dermabond was applied.    Overall, the patient tolerated this procedure well and she was allowed to wake   from anesthesia and she was extubated in the OR.  She was taken to  the PACU   in stable condition.  All sharps and sponge counts were correct by end of the   case on 2 occasions.      ASA SCORE:  3.      WOUND CLASSIFICATION:  Clean.       ____________________________________     MD LUANN Hart / DONYA    DD:  09/17/2019 10:40:27  DT:  09/17/2019 11:08:53    D#:  [id number]  Job#:  [id number]

## 2019-09-17 NOTE — ANESTHESIA PREPROCEDURE EVALUATION
Pt does not use O2 or CPAP. H/o post op PE.  Can climb stairs.  VSD has not presented limitations.    Relevant Problems   ANESTHESIA   (+) TOMI (obstructive sleep apnea)      NEURO   (+) History of basal cell cancer   (+) History of breast lump   (+) History of diverticulitis   (+) History of pulmonary embolism   (+) History of recurrent deep vein thrombosis (DVT)      CARDIAC   (+) Essential hypertension   (+) Venous insufficiency of both lower extremities       Physical Exam    Airway   Mallampati: II  TM distance: >3 FB  Neck ROM: full       Cardiovascular - normal exam  Rhythm: regular  Rate: normal  (-) murmur     Dental - normal exam  (+) upper dentures      Very poor dentition   Pulmonary - normal exam  Breath sounds clear to auscultation     Abdominal    Neurological - normal exam                 Anesthesia Plan    ASA 3   ASA physical status 3 criteria: morbid obesity - BMI greater than or equal to 40    Plan - general       Airway plan will be ETT        Induction: intravenous    Postoperative Plan: Postoperative administration of opioids is intended.    Pertinent diagnostic labs and testing reviewed    Informed Consent:    Anesthetic plan and risks discussed with patient.    Use of blood products discussed with: patient whom consented to blood products.

## 2019-09-17 NOTE — ANESTHESIA PROCEDURE NOTES
Airway  Date/Time: 9/17/2019 9:23 AM  Performed by: Jo Ann aMlik M.D.  Authorized by: Jo Ann Malik M.D.     Location:  OR  Urgency:  Elective  Indications for Airway Management:  Anesthesia  Spontaneous Ventilation: absent    Sedation Level:  Deep  Preoxygenated: Yes    Patient Position:  Sniffing  Mask Difficulty Assessment:  2 - vent by mask + OA or adjuvant +/- NMBA  Final Airway Type:  Endotracheal airway  Final Endotracheal Airway:  ETT  Cuffed: Yes    Technique Used for Successful ETT Placement:  Direct laryngoscopy  Insertion Site:  Oral  Blade Type:  Ania  Laryngoscope Blade/Videolaryngoscope Blade Size:  3  ETT Size (mm):  7.0  Measured from:  Teeth  ETT to Teeth (cm):  21  Placement Verified by: auscultation and capnometry    Cormack-Lehane Classification:  Grade IIa - partial view of glottis  Number of Attempts at Approach:  1

## 2019-09-17 NOTE — DISCHARGE INSTRUCTIONS
Hernia Repair Discharge Instructions:    1. DIET: After discharge from the hospital you may resume your normal preoperative diet without restrictions.    2. ACTIVITIES: After discharge from the hospital, you may resume full routine activities. Heavy lifting (over 15 pounds) and strenuous activities will make your incision sore and should be avoided for one month after surgery. Routine activities of daily living such as walking, going up and down stairs are acceptable.    3. DRIVING: You may drive whenever you are no longer taking narcotic pain medications and are able to perform the activities needed to drive safely, i.e. turning, bending, twisting, etc.    4. WOUND/BATHING: You may get the wound wet at any time after leaving the hospital. You may shower, but do not submerge in a bath or pool until seen for follow up. You may peel off the skin glue using a finger or tweezers in one week.    5. BOWEL FUNCTION: The combination of pain medication and decreased activity level can cause constipation in otherwise normal patients. If you feel this is occurring, take a laxative (Milk of Magnesia, Ex-Lax, Senokot, Miralax, Magnesium Citrate) until the problem has resolved.    6. PAIN MEDICATION: You will be given a prescription for pain medication at discharge. Please take these as directed. It is advisable to take your medication around the clock for the first 24 to 48 hours. You may continue any non-steroidal anti-inflammatory medications (NSAIDs) such as Advil in the post-operative period. These may and should be taken with your narcotic pain medication. It is important to remember not to take medications on an empty stomach as this may cause nausea. Do not consume alcohol while taking pain medication.    7. CALL IF YOU HAVE: (1) Fevers to more than 101F, (2) Unusual chest or leg pain, (3) Drainage or fluid from incision that may be foul smelling, increased tenderness or soreness at the wound or the wound edges are no  longer together, redness or swelling at the incision site.      8. FOLLOW-UP: Call and schedule a follow up appointment in 2 weeks.    If you have any additional questions at all, please do not hesitate to call the office and speak to my medical assistant, myself, or the physician on call.    [address], [address]  [place] [address]    Bartolo Daniels MD  [facility]  [phone]    ACTIVITY: Rest and take it easy for the first 24 hours.  A responsible adult is recommended to remain with you during that time.  It is normal to feel sleepy.  We encourage you to not do anything that requires balance, judgment or coordination.    MILD FLU-LIKE SYMPTOMS ARE NORMAL. YOU MAY EXPERIENCE GENERALIZED MUSCLE ACHES, THROAT IRRITATION, HEADACHE AND/OR SOME NAUSEA.    FOR 24 HOURS DO NOT:  Drive, operate machinery or run household appliances.  Drink beer or alcoholic beverages.   Make important decisions or sign legal documents.    SPECIAL INSTRUCTIONS: *SEE ATTACHED INSRUCTIONS FROM DR. DANIELS.**    DIET: To avoid nausea, slowly advance diet as tolerated, avoiding spicy or greasy foods for the first day.  Add more substantial food to your diet according to your physician's instructions.  Babies can be fed formula or breast milk as soon as they are hungry.  INCREASE FLUIDS AND FIBER TO AVOID CONSTIPATION.    SURGICAL DRESSING/BATHING: *MAY SHOWER BUT DO NOT SUBMERGE INCISION UNDER WATER.**    FOLLOW-UP APPOINTMENT:  A follow-up appointment should be arranged with your doctor in *CALL TO SCHEDULE FOLLOW UP APPOINTMENT.**    You should CALL YOUR PHYSICIAN if you develop:  Fever greater than 101 degrees F.  Pain not relieved by medication, or persistent nausea or vomiting.  Excessive bleeding (blood soaking through dressing) or unexpected drainage from the wound.  Extreme redness or swelling around the incision site, drainage of pus or foul smelling drainage.  Inability to urinate or empty your bladder within 8  hours.  Problems with breathing or chest pain.    You should call 911 if you develop problems with breathing or chest pain.  If you are unable to contact your doctor or surgical center, you should go to the nearest emergency room or urgent care center.  Physician's telephone #: *[phone]**    If any questions arise, call your doctor.  If your doctor is not available, please feel free to call the Surgical Center at [phone].  The Center is open Monday through Friday from 7AM to 7PM.  You can also call the HEALTH HOTLINE open 24 hours/day, 7 days/week and speak to a nurse at [phone], or toll free at [phone].    A registered nurse may call you a few days after your surgery to see how you are doing after your procedure.    MEDICATIONS: Resume taking daily medication.  Take prescribed pain medication with food.  If no medication is prescribed, you may take non-aspirin pain medication if needed.  PAIN MEDICATION CAN BE VERY CONSTIPATING.  Take a stool softener or laxative such as senokot, pericolace, or milk of magnesia if needed.    Prescription given for *oxycodone **.  Last pain medication given at *1130am**.    If your physician has prescribed pain medication that includes Acetaminophen (Tylenol), do not take additional Acetaminophen (Tylenol) while taking the prescribed medication.    Depression / Suicide Risk    As you are discharged from this [facility] facility, it is important to learn how to keep safe from harming yourself.    Recognize the warning signs:  · Abrupt changes in personality, positive or negative- including increase in energy   · Giving away possessions  · Change in eating patterns- significant weight changes-  positive or negative  · Change in sleeping patterns- unable to sleep or sleeping all the time   · Unwillingness or inability to communicate  · Depression  · Unusual sadness, discouragement and loneliness  · Talk of wanting to die  · Neglect of personal  appearance   · Rebelliousness- reckless behavior  · Withdrawal from people/activities they love  · Confusion- inability to concentrate     If you or a loved one observes any of these behaviors or has concerns about self-harm, here's what you can do:  · Talk about it- your feelings and reasons for harming yourself  · Remove any means that you might use to hurt yourself (examples: pills, rope, extension cords, firearm)  · Get professional help from the community (Mental Health, Substance Abuse, psychological counseling)  · Do not be alone:Call your Safe Contact- someone whom you trust who will be there for you.  · Call your local CRISIS HOTLINE 088-9122 or 624-326-4340  · Call your local Children's Mobile Crisis Response Team [place] (096) 945-3804 or www.[employer]  · Call the toll free National Suicide Prevention Hotlines   · National Suicide Prevention Lifeline 375-299-ACDB (2824)  · National Hope Line Network 800-SUICIDE (057-6144)

## 2019-09-17 NOTE — ANESTHESIA TIME REPORT
Anesthesia Start and Stop Event Times     Date Time Event    9/17/2019 0843 Ready for Procedure     0910 Anesthesia Start     1053 Anesthesia Stop        Responsible Staff  09/17/19    Name Role Begin End    Jo Ann Malik M.D. Anesth 0910 1053        Preop Diagnosis (Free Text):  Pre-op Diagnosis     UMBILICAL HERNIA INCARCERATED        Preop Diagnosis (Codes):    Post op Diagnosis  Umbilical hernia      Premium Reason  Non-Premium    Comments:

## 2019-09-17 NOTE — ANESTHESIA POSTPROCEDURE EVALUATION
Patient: Fior Ray    Procedure Summary     Date:  09/17/19 Room / Location:  [facility] ROOM 21 / SURGERY SAME DAY [facility]    Anesthesia Start:  0910 Anesthesia Stop:  1053    Procedure:  REPAIR, HERNIA, UMBILICAL- INCARCERATED (Abdomen) Diagnosis:  (UMBILICAL HERNIA INCARCERATED)    Surgeon:  Truong Daniels M.D. Responsible Provider:  Jo Ann Malik M.D.    Anesthesia Type:  general ASA Status:  3          Final Anesthesia Type: general  Last vitals  BP   Blood Pressure : 104/62    Temp   36.8 °C (98.3 °F)    Pulse   Pulse: 70   Resp   20    SpO2   94 %      Anesthesia Post Evaluation    Patient location during evaluation: PACU  Patient participation: complete - patient participated  Level of consciousness: awake and alert  Pain score: 5    Airway patency: patent  Anesthetic complications: no  Cardiovascular status: hemodynamically stable  Respiratory status: acceptable  Hydration status: euvolemic    PONV: none           Nurse Pain Score: 5 (NPRS)

## 2019-09-22 ENCOUNTER — APPOINTMENT (OUTPATIENT)
Dept: RADIOLOGY | Facility: MEDICAL CENTER | Age: 69
End: 2019-09-22
Attending: EMERGENCY MEDICINE
Payer: MEDICARE

## 2019-09-22 ENCOUNTER — HOSPITAL ENCOUNTER (EMERGENCY)
Facility: MEDICAL CENTER | Age: 69
End: 2019-09-22
Attending: EMERGENCY MEDICINE
Payer: MEDICARE

## 2019-09-22 VITALS
SYSTOLIC BLOOD PRESSURE: 145 MMHG | HEIGHT: 65 IN | HEART RATE: 74 BPM | RESPIRATION RATE: 18 BRPM | OXYGEN SATURATION: 92 % | TEMPERATURE: 97.1 F | DIASTOLIC BLOOD PRESSURE: 89 MMHG | WEIGHT: 267.42 LBS | BODY MASS INDEX: 44.55 KG/M2

## 2019-09-22 DIAGNOSIS — L03.115 CELLULITIS OF RIGHT LOWER EXTREMITY: ICD-10-CM

## 2019-09-22 DIAGNOSIS — K59.03 DRUG-INDUCED CONSTIPATION: ICD-10-CM

## 2019-09-22 LAB
ALBUMIN SERPL BCP-MCNC: 3.9 G/DL (ref 3.2–4.9)
ALBUMIN/GLOB SERPL: 1.2 G/DL
ALP SERPL-CCNC: 70 U/L (ref 30–99)
ALT SERPL-CCNC: 7 U/L (ref 2–50)
ANION GAP SERPL CALC-SCNC: 10 MMOL/L (ref 0–11.9)
APPEARANCE UR: CLEAR
APTT PPP: 25 SEC (ref 24.7–36)
AST SERPL-CCNC: 13 U/L (ref 12–45)
BASOPHILS # BLD AUTO: 0.4 % (ref 0–1.8)
BASOPHILS # BLD: 0.04 K/UL (ref 0–0.12)
BILIRUB SERPL-MCNC: 0.5 MG/DL (ref 0.1–1.5)
BILIRUB UR QL STRIP.AUTO: NEGATIVE
BUN SERPL-MCNC: 9 MG/DL (ref 8–22)
CALCIUM SERPL-MCNC: 9.4 MG/DL (ref 8.5–10.5)
CHLORIDE SERPL-SCNC: 100 MMOL/L (ref 96–112)
CO2 SERPL-SCNC: 27 MMOL/L (ref 20–33)
COLOR UR: YELLOW
CREAT SERPL-MCNC: 0.68 MG/DL (ref 0.5–1.4)
EOSINOPHIL # BLD AUTO: 0.42 K/UL (ref 0–0.51)
EOSINOPHIL NFR BLD: 4.4 % (ref 0–6.9)
ERYTHROCYTE [DISTWIDTH] IN BLOOD BY AUTOMATED COUNT: 47.5 FL (ref 35.9–50)
GLOBULIN SER CALC-MCNC: 3.2 G/DL (ref 1.9–3.5)
GLUCOSE SERPL-MCNC: 104 MG/DL (ref 65–99)
GLUCOSE UR STRIP.AUTO-MCNC: NEGATIVE MG/DL
HCT VFR BLD AUTO: 41.6 % (ref 37–47)
HGB BLD-MCNC: 12.9 G/DL (ref 12–16)
IMM GRANULOCYTES # BLD AUTO: 0.04 K/UL (ref 0–0.11)
IMM GRANULOCYTES NFR BLD AUTO: 0.4 % (ref 0–0.9)
INR PPP: 0.97 (ref 0.87–1.13)
KETONES UR STRIP.AUTO-MCNC: NEGATIVE MG/DL
LACTATE BLD-SCNC: 1.5 MMOL/L (ref 0.5–2)
LEUKOCYTE ESTERASE UR QL STRIP.AUTO: NEGATIVE
LIPASE SERPL-CCNC: 10 U/L (ref 11–82)
LYMPHOCYTES # BLD AUTO: 3.02 K/UL (ref 1–4.8)
LYMPHOCYTES NFR BLD: 32 % (ref 22–41)
MCH RBC QN AUTO: 28 PG (ref 27–33)
MCHC RBC AUTO-ENTMCNC: 31 G/DL (ref 33.6–35)
MCV RBC AUTO: 90.4 FL (ref 81.4–97.8)
MICRO URNS: NORMAL
MONOCYTES # BLD AUTO: 0.72 K/UL (ref 0–0.85)
MONOCYTES NFR BLD AUTO: 7.6 % (ref 0–13.4)
NEUTROPHILS # BLD AUTO: 5.2 K/UL (ref 2–7.15)
NEUTROPHILS NFR BLD: 55.2 % (ref 44–72)
NITRITE UR QL STRIP.AUTO: NEGATIVE
NRBC # BLD AUTO: 0 K/UL
NRBC BLD-RTO: 0 /100 WBC
PH UR STRIP.AUTO: 7 [PH] (ref 5–8)
PLATELET # BLD AUTO: 297 K/UL (ref 164–446)
PMV BLD AUTO: 8.8 FL (ref 9–12.9)
POTASSIUM SERPL-SCNC: 3.3 MMOL/L (ref 3.6–5.5)
PROT SERPL-MCNC: 7.1 G/DL (ref 6–8.2)
PROT UR QL STRIP: NEGATIVE MG/DL
PROTHROMBIN TIME: 13.1 SEC (ref 12–14.6)
RBC # BLD AUTO: 4.6 M/UL (ref 4.2–5.4)
RBC UR QL AUTO: NEGATIVE
SODIUM SERPL-SCNC: 137 MMOL/L (ref 135–145)
SP GR UR STRIP.AUTO: 1.01
UROBILINOGEN UR STRIP.AUTO-MCNC: 0.2 MG/DL
WBC # BLD AUTO: 9.4 K/UL (ref 4.8–10.8)

## 2019-09-22 PROCEDURE — 85610 PROTHROMBIN TIME: CPT

## 2019-09-22 PROCEDURE — 99285 EMERGENCY DEPT VISIT HI MDM: CPT

## 2019-09-22 PROCEDURE — 36415 COLL VENOUS BLD VENIPUNCTURE: CPT

## 2019-09-22 PROCEDURE — 83690 ASSAY OF LIPASE: CPT

## 2019-09-22 PROCEDURE — 81003 URINALYSIS AUTO W/O SCOPE: CPT

## 2019-09-22 PROCEDURE — 93971 EXTREMITY STUDY: CPT | Mod: RT

## 2019-09-22 PROCEDURE — 85025 COMPLETE CBC W/AUTO DIFF WBC: CPT

## 2019-09-22 PROCEDURE — 85730 THROMBOPLASTIN TIME PARTIAL: CPT

## 2019-09-22 PROCEDURE — 80053 COMPREHEN METABOLIC PANEL: CPT

## 2019-09-22 PROCEDURE — 83605 ASSAY OF LACTIC ACID: CPT

## 2019-09-22 PROCEDURE — 74022 RADEX COMPL AQT ABD SERIES: CPT

## 2019-09-22 PROCEDURE — 93971 EXTREMITY STUDY: CPT | Mod: 26,RT,GZ | Performed by: INTERNAL MEDICINE

## 2019-09-22 RX ORDER — CEPHALEXIN 500 MG/1
500 CAPSULE ORAL 2 TIMES DAILY
Qty: 20 CAP | Refills: 0 | Status: SHIPPED | OUTPATIENT
Start: 2019-09-22 | End: 2019-10-02

## 2019-09-22 ASSESSMENT — LIFESTYLE VARIABLES: DO YOU DRINK ALCOHOL: NO

## 2019-09-22 NOTE — ED PROVIDER NOTES
"ED Provider Note    CHIEF COMPLAINT  Chief Complaint   Patient presents with   • Abdominal Pain   • Constipation     last BM tuesday prior to surgery    • Leg Pain     right leg pain/redness/\"hot spot\"        [facility]  Fior Ray is a 69 y.o. female who presents for evaluation of swelling and redness to her right lower leg as well as constipation and abdominal pain.  The patient had recent abdominal surgery by Dr. Daniels.  Patient states and since she had the surgery this past week she has been not been able to have a bowel movement.  She is currently on narcotic analgesics.  In addition, the patient states she developed redness and swelling to her right lower extremity.  She relates past history of DVT.  The patient denies: Fever, chills, URI symptoms, cardiorespiratory symptoms, vomiting, hematemesis, melena hematochezia, hematuria, dysuria.  No other acute symptomatology or complaints.    REVIEW OF SYSTEMS  See HPI for further details. All other systems negative.    PAST MEDICAL HISTORY  Past Medical History:   Diagnosis Date   • Cancer ([facility]) 2012    skin   • Dental disorder 09/11/2019    upper denture   • Diverticulitis    • Hypertension    • Morbid obesity ([facility])    • Obesity    • TOMI (obstructive sleep apnea)    • PE (pulmonary thromboembolism) ([facility])    • Urinary bladder disorder    • Urinary incontinence 09/11/2019   • Venous insufficiency of both lower extremities    • VSD (ventricular septal defect and aortic arch hypoplasia        FAMILY HISTORY  Family History   Problem Relation Age of Onset   • Lung Disease Mother         COPD    • Heart Attack Father    • No Known Problems Sister    • No Known Problems Brother    • No Known Problems Maternal Grandmother    • No Known Problems Maternal Grandfather    • No Known Problems Paternal Grandmother    • Heart Attack Paternal Grandfather        SOCIAL HISTORY  Resides locally;    SURGICAL HISTORY  Past Surgical History:   Procedure Laterality Date   • " "UMBILICAL HERNIA REPAIR  9/17/2019    Procedure: REPAIR, HERNIA, UMBILICAL- INCARCERATED;  Surgeon: Truong Daniels M.D.;  Location: SURGERY SAME DAY [facility];  Service: General   • KNEE ARTHROPLASTY TOTAL Right 2014   • HYSTERECTOMY, VAGINAL     • VEIN STRIPPING         CURRENT MEDICATIONS  Home Medications     Reviewed by Sammi Layne R.N. (Registered Nurse) on 09/22/19 at 1106  Med List Status: Partial   Medication Last Dose Status   hydroCHLOROthiazide (HYDRODIURIL) 25 MG Tab 9/22/2019 Active   lisinopril (PRINIVIL, ZESTRIL) 30 MG tablet 9/22/2019 Active   oxyCODONE immediate-release (ROXICODONE) 5 MG Tab 9/22/2019 Active                ALLERGIES  Allergies   Allergen Reactions   • Mustard [Allyl Isothiocyanate] Anaphylaxis   • Prochlorperazine Edisylate [Compazine] Anaphylaxis       PHYSICAL EXAM  VITAL SIGNS: BP (!) 165/94   Pulse 88   Temp 36.2 °C (97.1 °F) (Temporal)   Resp 16   Ht 1.651 m (5' 5\")   Wt 121.3 kg (267 lb 6.7 oz)   SpO2 99%   BMI 44.50 kg/m²    Constitutional: 69-year-old female, mildly weak appearing, oriented x3  HENT: ,Atraumatic, Bilateral external ears normal, tympanic membranes clear, Oropharynx mildly dry, No oral exudates, Nose normal.   Eyes: PERRL, EOMI, Conjunctiva normal, No discharge.   Neck: Normal range of motion, No tenderness, Supple, No stridor.   Lymphatic: No lymphadenopathy noted.   Cardiovascular: Normal heart rate, Normal rhythm, grade 2/6 systolic murmur, No rubs, No gallops.   Thorax & Lungs: Mild bilateral rhonchi, No respiratory distress, No wheezing, no stridor, no rales. No chest tenderness.   Abdomen: Surgical scars identified in the central periumbilical area; soft, nontender, mild distention, no organomegaly, decreased breath sounds; no guarding or rebound.  Skin: Decreased skin turgor, pink, warm, dry. No rashes, petechiae, purpura. Normal capillary refill.   Back: No tenderness, No CVA tenderness.   Genitalia: External genitalia appear normal, No " masses or lesions. No discharge. Nontender  Rectal: Normal appearance, Normal sphincter tone. No external or internal lesions noted. Stool is normal color and heme negative.   Extremities: Intact distal pulses, patient has swelling and increased circumference in the right calf compared to left with associated mild erythema edema and warmth;, No cyanosis, No clubbing. Vascular: Pulses are 1/2+, symmetric in the upper and lower extremities.  Musculoskeletal: Diffuse arthritic changes. No tenderness to palpation or major deformities noted.   Neurologic: Alert & oriented x 3, Normal motor function, Normal sensory function, No gross focal deficits noted.   Psychiatric: Affect normal, Judgment normal, Mood normal.     RADIOLOGY/PROCEDURES  DX-ABDOMEN COMPLETE WITH AP OR PA CXR   Final Result      No evidence of obstruction, perforation or ileus.      Large amount of stool in the colon suggesting constipation.      IVC filter.      Bibasilar atelectasis.      US-EXTREMITY VENOUS LOWER UNILAT RIGHT   Final Result        Right lower extremity -   The peroneal and posterior tibial veins are difficult to assess for    compressibility due to edema, but flow response to augmentation is    demonstrated.    All other veins of the right lower extremity demonstrate normal flow    dynamics with no evidence of deep vein thrombosis or superficial    thrombophlebitis.    Flow was evaluated in the contralateral common femoral vein and normal    venous flow dynamics including spontaneous flow, respiratory phasic    variation and augmentation were demonstrated.     COURSE & MEDICAL DECISION MAKING  Pertinent Labs & Imaging studies reviewed. (See chart for details)  1.  Saline lock  2.  Soapsuds enema    Laboratory studies: CBC shows white count of 9.4, 55% neutrophils, 32% lymphocytes, 7% monocytes, hemoglobin 12.9 hematocrit 41.6; CMP shows potassium 3.3, otherwise within normal; lipase 10; lactic acid 1.5;    Discussion: At this time,  patient presents for evaluation of constipation as well as swelling in the right lower extremity.  Patient had noninvasive study which showed no evidence of DVT.  Patient may have mild cellulitis, but I feel this can be treated as an outpatient at this time.  Patient did get some relief with the enema.  At this time, the patient was reevaluated and had no deterioration in her status.  I discussed the findings and treatment plan with the patient.  She indicates that she is comfortable with this explanation disposition.    FINAL IMPRESSION  1. Drug-induced constipation    2. Cellulitis of right lower extremity        PLAN  1.  Appropriate discharge instructions given  2.  Keflex 500 mg twice daily #20  3.  Elevation of right leg;  4.  Treatment for constipation   5.  Follow-up close with primary care within 24-48 hours; follow-up with general surgery;    Electronically signed by: Guy G Gansert, 9/22/2019 12:18 PM

## 2019-09-22 NOTE — DISCHARGE INSTRUCTIONS
1.  Use MiraLAX for constipation; 2.  You may also try citrate of magnesia; both of these you can obtain at the pharmacy as they are over-the-counter;  2.  Elevate right leg; 3.  Follow-up with your physician 1-2 days;

## 2019-09-22 NOTE — ED NOTES
Discharge instructions given.  All questions answered.  Prescriptions given x1, take miralax and mag citrate OTC for constipation.  Pt to follow-up with PCP, return to ER if symptoms worsen as discussed.  Pt verbalized understanding.  All belongings with pt.  Pt ambulated to lobby.

## 2019-09-22 NOTE — ED TRIAGE NOTES
"Pt to triage .  Chief Complaint   Patient presents with   • Abdominal Pain   • Constipation     last BM tuesday prior to surgery    • Leg Pain     right leg pain/redness/\"hot spot\"      "

## 2019-10-07 ENCOUNTER — OFFICE VISIT (OUTPATIENT)
Dept: MEDICAL GROUP | Facility: PHYSICIAN GROUP | Age: 69
End: 2019-10-07
Payer: MEDICARE

## 2019-10-07 VITALS
WEIGHT: 261.9 LBS | SYSTOLIC BLOOD PRESSURE: 122 MMHG | HEART RATE: 85 BPM | DIASTOLIC BLOOD PRESSURE: 64 MMHG | TEMPERATURE: 98.4 F | BODY MASS INDEX: 44.71 KG/M2 | HEIGHT: 64 IN | OXYGEN SATURATION: 95 %

## 2019-10-07 DIAGNOSIS — Z12.12 SCREENING FOR COLORECTAL CANCER: ICD-10-CM

## 2019-10-07 DIAGNOSIS — L03.115 CELLULITIS OF RIGHT LOWER EXTREMITY: ICD-10-CM

## 2019-10-07 DIAGNOSIS — Z12.11 SCREENING FOR COLORECTAL CANCER: ICD-10-CM

## 2019-10-07 DIAGNOSIS — Z23 NEED FOR VACCINATION: ICD-10-CM

## 2019-10-07 PROCEDURE — 99214 OFFICE O/P EST MOD 30 MIN: CPT | Mod: 25 | Performed by: FAMILY MEDICINE

## 2019-10-07 PROCEDURE — 90662 IIV NO PRSV INCREASED AG IM: CPT | Performed by: FAMILY MEDICINE

## 2019-10-07 PROCEDURE — G0008 ADMIN INFLUENZA VIRUS VAC: HCPCS | Performed by: FAMILY MEDICINE

## 2019-10-07 RX ORDER — AMOXICILLIN 500 MG/1
500 CAPSULE ORAL 3 TIMES DAILY
Qty: 30 CAP | Refills: 0 | Status: SHIPPED
Start: 2019-10-07 | End: 2020-03-02

## 2019-10-08 NOTE — PROGRESS NOTES
CC: Cellulitis    HISTORY OF THE PRESENT ILLNESS: Patient is a 69 y.o. female. This pleasant patient is here today for follow-up.    Patient's main concern today is cellulitis of her right lower extremity.  She has severe lymphedema.  She is been treated on a recurrent basis with Keflex for right lower extremity erythema, warmth and pain.  She feels this is continued in spite of treatment.  She recently was seen postoperatively by her surgeon (as she recently had a umbilical hernia repair) and was told that she may need a different antibiotic.  She is wondering if she should try something different than Keflex.  She has no systemic symptoms such as fevers or chills.  She does follow with the vein clinic but has not done so for probably a year.    Of note, she was also seen in the emergency department on September 22 for right lower extremity erythema and swelling in the postoperative setting.  She did have lower extremity Doppler to rule out DVT.  She was again treated with Keflex for cellulitis.    Would also like flu vaccine today.    Allergies: Mustard [allyl isothiocyanate] and Prochlorperazine edisylate [compazine]    Current Outpatient Medications Ordered in Epic   Medication Sig Dispense Refill   • amoxicillin (AMOXIL) 500 MG Cap Take 1 Cap by mouth 3 times a day. 30 Cap 0   • lisinopril (PRINIVIL, ZESTRIL) 30 MG tablet Take 1 Tab by mouth every day. 90 Tab 1   • hydroCHLOROthiazide (HYDRODIURIL) 25 MG Tab Take 1 Tab by mouth every day. 90 Tab 1     No current Epic-ordered facility-administered medications on file.        Past Medical History:   Diagnosis Date   • Cancer ([facility]) 2012    skin   • Dental disorder 09/11/2019    upper denture   • Diverticulitis    • Hypertension    • Morbid obesity ([facility])    • Obesity    • TOMI (obstructive sleep apnea)    • PE (pulmonary thromboembolism) ([facility])    • Urinary bladder disorder    • Urinary incontinence 09/11/2019   • Venous insufficiency of both lower extremities    •  "VSD (ventricular septal defect and aortic arch hypoplasia        Past Surgical History:   Procedure Laterality Date   • UMBILICAL HERNIA REPAIR  9/17/2019    Procedure: REPAIR, HERNIA, UMBILICAL- INCARCERATED;  Surgeon: Truong Daniels M.D.;  Location: SURGERY SAME DAY [facility];  Service: General   • KNEE ARTHROPLASTY TOTAL Right 2014   • HYSTERECTOMY, VAGINAL     • VEIN STRIPPING         Social History     Tobacco Use   • Smoking status: Never Smoker   • Smokeless tobacco: Never Used   Substance Use Topics   • Alcohol use: No   • Drug use: No       Social History     Social History Narrative   • Not on file       Family History   Problem Relation Age of Onset   • Lung Disease Mother         COPD    • Heart Attack Father    • No Known Problems Sister    • No Known Problems Brother    • No Known Problems Maternal Grandmother    • No Known Problems Maternal Grandfather    • No Known Problems Paternal Grandmother    • Heart Attack Paternal Grandfather        ROS:     - Constitutional: Negative for fever, chills, unexpected weight change, and fatigue/generalized weakness.     - Respiratory: Negative for cough, sputum production, chest congestion, dyspnea, wheezing, and crackles.      - Cardiovascular: Negative for chest pain, palpitations, orthopnea, PND, and bilateral lower extremity edema.     Exam: /64 (BP Location: Left arm, Patient Position: Sitting, BP Cuff Size: Large adult)   Pulse 85   Temp 36.9 °C (98.4 °F) (Temporal)   Ht 1.626 m (5' 4\")   Wt 118.8 kg (261 lb 14.4 oz)   SpO2 95%  Body mass index is 44.96 kg/m².    General: Well appearing, NAD  Neck: Supple without JVD. No thyromegaly or nodules  Pulmonary: Clear to ausculation.  Normal effort. No rales, ronchi, or wheezing.  Cardiovascular: Regular rate and rhythm without murmur, rubs or gallop.   Ext: Gross edema noted of both lower extremities.  She has very minimal erythema on the right lower extremity and some minimal warmth as well.  No " purulent drainage.  No clearly demarcated erythema.  Psych: Normal mood and affect. Alert and oriented. Judgment and insight is normal.    Please note that this dictation was created using voice recognition software. I have made every reasonable attempt to correct obvious errors, but I expect that there are errors of grammar and possibly content that I did not discover before finalizing the note.      Assessment/Plan  Fior was seen today for follow-up and immunizations.    Diagnoses and all orders for this visit:    Need for vaccination  -     INFLUENZA VACCINE, HIGH DOSE (65+ ONLY)    Cellulitis of right lower extremity  Ongoing issue for the patient.  I did discuss with her today, that it may be that she has chronic inflammation of that leg as opposed to infection.  She has absolutely no systemic symptoms, and from my perspective her lower extremity edema and warmth has improved considerably since last time I saw her.  In any case we will trial amoxicillin as opposed to Keflex.  If her symptoms are not improving, she agrees to let me know.  -     amoxicillin (AMOXIL) 500 MG Cap; Take 1 Cap by mouth 3 times a day.    Screening for colorectal cancer  -     OCCULT BLOOD FECES IMMUNOASSAY (FIT); Future      Follow-up if symptoms not improving.    Sara Mei, DO  [facility]

## 2019-11-14 DIAGNOSIS — I10 ESSENTIAL HYPERTENSION: ICD-10-CM

## 2019-11-14 RX ORDER — LISINOPRIL 30 MG/1
30 TABLET ORAL DAILY
Qty: 100 TAB | Refills: 0 | Status: SHIPPED | OUTPATIENT
Start: 2019-11-14 | End: 2020-03-02 | Stop reason: SDUPTHER

## 2019-11-14 NOTE — TELEPHONE ENCOUNTER
Was the patient seen in the last year in this department? Yes    Does patient have an active prescription for medications requested? Yes    Received Request Via: Pharmacy      Pt met protocol?: Yes   Last ov 10/7/19 needs this changed to 100day supply   BP Readings from Last 1 Encounters:   10/07/19 122/64

## 2020-02-26 ENCOUNTER — TELEPHONE (OUTPATIENT)
Dept: MEDICAL GROUP | Facility: PHYSICIAN GROUP | Age: 70
End: 2020-02-26

## 2020-02-26 NOTE — TELEPHONE ENCOUNTER
ANNUAL WELLNESS VISIT PRE-VISIT PLANNING WITHOUT OUTREACH    1.  Reviewed note from last office visit with PCP: YES    2.  If any orders were placed at last visit, do we have Results/Consult Notes?        •  Labs - Labs ordered, NOT completed. Patient advised to complete prior to next appointment.       •  Imaging - Imaging was not ordered at last office visit.       •  Referrals - No referrals were ordered at last office visit.    3.  Immunizations were updated in [facility] using WebIZ?: Yes       •  WebIZ Recommendations: TDAP, SHINGRIX (Shingles) and Varicella        •  Is patient due for Tdap? YES. Patient was notified of copay/out of pocket cost.       •  Is patient due for Shingrix? NO     4.  Patient is due for the following Health Maintenance Topics:   Health Maintenance Due   Topic Date Due   • Annual Wellness Visit  1950   • HEPATITIS C SCREENING  1950   • IMM DTaP/Tdap/Td Vaccine (1 - Tdap) 03/23/1961   • COLONOSCOPY  03/23/2000   • BONE DENSITY  03/23/2015   • MAMMOGRAM  03/03/2020     5.  Reviewed/Updated the following with patient:       •   Preferred Pharmacy? YES       •   Preferred Lab? YES       •   Preferred Communication? YES       •   Allergies? YES       •   Medications? YES. Was Abstract Encounter opened and chart updated? YES       •   Social History? YES. Was Abstract Encounter opened and chart updated? YES       •   Family History (document living status of immediate family members and if + hx of  cancer, diabetes, hypertension, hyperlipidemia, heart attack, stroke) YES. Was Abstract Encounter opened and chart updated? YES    6.  Care Team Updated:       •   DME Company (gait device, O2, CPAP, etc.): YES       •   Other Specialists (eye doctor, derm, GYN, cardiology, endo, etc): YES    7. Orders for overdue Health Maintenance topics pended in Pre-Charting? NO    8.  Patient has the following Care Path diagnoses on Problem List:  NONE    9.  Patient was advised: “This is a free  wellness visit. The provider will screen for medical conditions to help you stay healthy. If you have other concerns to address you may be asked to discuss these at a separate visit or there may be an additional fee.”     10.  Patient was informed to arrive 15 min prior to their scheduled appointment and bring in their medication bottles.

## 2020-03-02 ENCOUNTER — OFFICE VISIT (OUTPATIENT)
Dept: MEDICAL GROUP | Facility: PHYSICIAN GROUP | Age: 70
End: 2020-03-02
Payer: MEDICARE

## 2020-03-02 VITALS
DIASTOLIC BLOOD PRESSURE: 70 MMHG | OXYGEN SATURATION: 94 % | HEIGHT: 65 IN | WEIGHT: 260 LBS | RESPIRATION RATE: 16 BRPM | BODY MASS INDEX: 43.32 KG/M2 | TEMPERATURE: 97.9 F | HEART RATE: 77 BPM | SYSTOLIC BLOOD PRESSURE: 128 MMHG

## 2020-03-02 DIAGNOSIS — E66.01 MORBID OBESITY (HCC): Chronic | ICD-10-CM

## 2020-03-02 DIAGNOSIS — R92.8 ABNORMAL MAMMOGRAM OF LEFT BREAST: ICD-10-CM

## 2020-03-02 DIAGNOSIS — Z86.711 HISTORY OF PULMONARY EMBOLISM: ICD-10-CM

## 2020-03-02 DIAGNOSIS — Z12.11 SCREENING FOR COLORECTAL CANCER: ICD-10-CM

## 2020-03-02 DIAGNOSIS — Z87.898 HISTORY OF BREAST LUMP: ICD-10-CM

## 2020-03-02 DIAGNOSIS — D68.9 COAGULOPATHY (HCC): ICD-10-CM

## 2020-03-02 DIAGNOSIS — K43.9 VENTRAL HERNIA WITHOUT OBSTRUCTION OR GANGRENE: ICD-10-CM

## 2020-03-02 DIAGNOSIS — I87.2 VENOUS INSUFFICIENCY OF BOTH LOWER EXTREMITIES: Chronic | ICD-10-CM

## 2020-03-02 DIAGNOSIS — Z95.828 PRESENCE OF INFERIOR VENA CAVA FILTER: ICD-10-CM

## 2020-03-02 DIAGNOSIS — Z12.31 ENCOUNTER FOR SCREENING MAMMOGRAM FOR BREAST CANCER: ICD-10-CM

## 2020-03-02 DIAGNOSIS — E78.00 PURE HYPERCHOLESTEROLEMIA: ICD-10-CM

## 2020-03-02 DIAGNOSIS — E78.49 OTHER HYPERLIPIDEMIA: ICD-10-CM

## 2020-03-02 DIAGNOSIS — Z12.12 SCREENING FOR COLORECTAL CANCER: ICD-10-CM

## 2020-03-02 DIAGNOSIS — Z00.00 ENCOUNTER FOR PREVENTIVE CARE: ICD-10-CM

## 2020-03-02 DIAGNOSIS — Q21.0 VSD (VENTRICULAR SEPTAL DEFECT): ICD-10-CM

## 2020-03-02 DIAGNOSIS — Z87.19 HISTORY OF DIVERTICULITIS: ICD-10-CM

## 2020-03-02 DIAGNOSIS — R05.9 COUGH: ICD-10-CM

## 2020-03-02 DIAGNOSIS — L03.115 CELLULITIS OF RIGHT LOWER EXTREMITY: ICD-10-CM

## 2020-03-02 DIAGNOSIS — R51.9 SINUS HEADACHE: ICD-10-CM

## 2020-03-02 DIAGNOSIS — J96.11 CHRONIC RESPIRATORY FAILURE WITH HYPOXIA (HCC): ICD-10-CM

## 2020-03-02 DIAGNOSIS — H53.9 VISION CHANGES: ICD-10-CM

## 2020-03-02 DIAGNOSIS — Z85.828 HISTORY OF BASAL CELL CANCER: ICD-10-CM

## 2020-03-02 DIAGNOSIS — R60.0 LOWER EXTREMITY EDEMA: ICD-10-CM

## 2020-03-02 DIAGNOSIS — I10 ESSENTIAL HYPERTENSION: ICD-10-CM

## 2020-03-02 DIAGNOSIS — R73.03 PREDIABETES: ICD-10-CM

## 2020-03-02 DIAGNOSIS — Z00.00 MEDICARE ANNUAL WELLNESS VISIT, SUBSEQUENT: Primary | ICD-10-CM

## 2020-03-02 DIAGNOSIS — Z11.59 NEED FOR HEPATITIS C SCREENING TEST: ICD-10-CM

## 2020-03-02 DIAGNOSIS — G47.33 OSA (OBSTRUCTIVE SLEEP APNEA): ICD-10-CM

## 2020-03-02 DIAGNOSIS — Z86.718 HISTORY OF RECURRENT DEEP VEIN THROMBOSIS (DVT): ICD-10-CM

## 2020-03-02 PROBLEM — G89.18 ACUTE POSTOPERATIVE PAIN: Status: RESOLVED | Noted: 2019-09-17 | Resolved: 2020-03-02

## 2020-03-02 PROBLEM — D62 ANEMIA ASSOCIATED WITH ACUTE BLOOD LOSS: Status: RESOLVED | Noted: 2018-10-12 | Resolved: 2020-03-02

## 2020-03-02 PROCEDURE — G0439 PPPS, SUBSEQ VISIT: HCPCS | Performed by: FAMILY MEDICINE

## 2020-03-02 PROCEDURE — 8041 PR SCP AHA: Performed by: FAMILY MEDICINE

## 2020-03-02 RX ORDER — LISINOPRIL 30 MG/1
30 TABLET ORAL DAILY
Qty: 100 TAB | Refills: 3 | Status: SHIPPED | OUTPATIENT
Start: 2020-03-02 | End: 2021-03-08 | Stop reason: SDUPTHER

## 2020-03-02 RX ORDER — FLUTICASONE PROPIONATE 50 MCG
1 SPRAY, SUSPENSION (ML) NASAL DAILY
Qty: 16 G | Refills: 3 | Status: SHIPPED | OUTPATIENT
Start: 2020-03-02 | End: 2020-05-29

## 2020-03-02 RX ORDER — AMOXICILLIN 500 MG/1
500 CAPSULE ORAL 3 TIMES DAILY
Qty: 21 CAP | Refills: 0 | Status: SHIPPED | OUTPATIENT
Start: 2020-03-02 | End: 2020-03-09

## 2020-03-02 ASSESSMENT — PATIENT HEALTH QUESTIONNAIRE - PHQ9: CLINICAL INTERPRETATION OF PHQ2 SCORE: 0

## 2020-03-02 ASSESSMENT — ENCOUNTER SYMPTOMS: GENERAL WELL-BEING: GOOD

## 2020-03-02 ASSESSMENT — FIBROSIS 4 INDEX: FIB4 SCORE: 1.14

## 2020-03-02 ASSESSMENT — ACTIVITIES OF DAILY LIVING (ADL)
BATHING_REQUIRES_ASSISTANCE: 0
TRANSPORTATION COMMENTS: HUSBAND DRIVES

## 2020-03-02 NOTE — PROGRESS NOTES
Chief Complaint   Patient presents with   • Annual Wellness Visit       HPI:  Fior is a 69 y.o. here for Medicare Annual Wellness Visit    Venous insufficiency of both lower extremities  Chronic and stable issue. She continues to use HCTZ PRN for worsening peripheral edema.      Morbid obesity (HCC)  Patient's body mass index is 43.27 kg/m² placing her in the mrobid obesity category.      Anemia associated with acute blood loss  Last lab work in 9/2019 showed normal RBC and H&H levels.     Coagulopathy (HCC)  History of PE and DVT.  Not currently anticoagulated.  Has IVC filter in place.    History of pulmonary embolism  Stable and doing well. She denies any shortness of breath.     Essential Hypertension  She takes Lisinopril 30 mg for her hypertension without any side effects. Blood pressure in the office today is normal at 128/70.     TOMI (obstructive sleep apnea)  Chronic issue. She continues to decline any treatment.     History of diverticulitis  No acute symptoms at this time.     Presence of inferior vena cava filter  No concerning symptoms at this time.     Lower extremity edema  Chronic issue with recurrent cellulitis issues.     Chronic respiratory failure with hypoxia (HCC)  Chronic issue. Patient continues to decline O2 supplementation.     History of breast lump  Last mammogram in 9/2019 showed evidence of chronic, benign right breast calcifications.  Six-month follow-up was recommended she is due at this time.    History of basal cell cancer  Chronic issue. She does not have any acute concerning areas today.     Prediabetes  Last A1C in 5/2019 was 6.0. She manages this through her own efforts.     Pure hypercholesterolemia  She continues to manage this through her own efforts. Last lipid panel in 5/2019 showed elevated LDL at 130.     Ventral hernia without obstruction or gangrene  S/P hernia repair. She is doing well and feeling improved.     VSD (ventricular septal defect)  Seen on prior  echocardiogram in 7/2019. Repeat echocardiogram ordered which has not yet been completed.     Cellulitis of right lower extremity   She has new complaints today of concerns about a red spot over her right anterior shin. She states the area feels warm and is pruritic. It is not painful. She notes that she has had similar areas in the past on her left lower leg which were successfully treated with Amoxicillin. She denies any fevers/chills or systemic symptoms.    Cough  Sinus headache  She also notes that she has had a dry cough for the past 2 months. She believes this is due to irritation in her throat and denies any chest congestion.  She has had some sinus headaches. She denies any shortness of breath. She has a history of PE, and she states her symptoms are not similar to her prior presentations.     Vision Changes   She is requesting a referral to ophthalmology for generalized vision changes.       Patient Active Problem List    Diagnosis Date Noted   • Coagulopathy ([facility]) 10/12/2018     Priority: High   • TOMI (obstructive sleep apnea) 10/14/2018     Priority: Medium   • History of pulmonary embolism 10/12/2018     Priority: Medium   • Essential hypertension 10/12/2018     Priority: Medium   • VSD (ventricular septal defect) 10/12/2018     Priority: Low   • Morbid obesity ([facility])      Priority: Low   • Ventral hernia without obstruction or gangrene 09/17/2019   • Pure hypercholesterolemia 05/10/2019   • Other hyperlipidemia 05/02/2019   • Venous insufficiency of both lower extremities    • Prediabetes 01/23/2019   • History of diverticulitis 01/21/2019   • History of recurrent deep vein thrombosis (DVT) 01/21/2019   • Presence of inferior vena cava filter 01/21/2019   • Lower extremity edema 01/21/2019   • Chronic respiratory failure with hypoxia ([facility]) 01/21/2019   • History of breast lump 01/21/2019   • History of basal cell cancer 01/21/2019       Current Outpatient Medications   Medication Sig Dispense Refill   •  amoxicillin (AMOXIL) 500 MG Cap Take 1 Cap by mouth 3 times a day for 7 days. 21 Cap 0   • fluticasone (FLONASE) 50 MCG/ACT nasal spray Spray 1 Spray in nose every day. 16 g 3   • lisinopril (PRINIVIL) 30 MG tablet Take 1 Tab by mouth every day. 100 Tab 3   • hydroCHLOROthiazide (HYDRODIURIL) 25 MG Tab Take 1 Tab by mouth every day. 90 Tab 1     No current facility-administered medications for this visit.         Patient is taking medications as noted in medication list.  Current supplements as per medication list.     Allergies: Mustard [allyl isothiocyanate] and Prochlorperazine edisylate [compazine]    Current social contact/activities: Visiting with friends and family, [facility] center    Is patient current with immunizations? No, due for TDAP. Patient is interested in receiving NONE today.    She  reports that she has never smoked. She has never used smokeless tobacco. She reports that she does not drink alcohol or use drugs.  Counseling given: Not Answered        DPA/Advanced directive: Patient has Advanced Directive on file.     ROS:    Gait: Uses no assistive device   Ostomy: No   Other tubes: No   Amputations: No   Chronic oxygen use No   Last eye exam 2+ years ago   Wears hearing aids: No   : Reports urinary leakage during the last 6 months that has not interfered at all with their daily activities or sleep.    Annual Health Assessment Questions:    1.  Are you currently engaging in any exercise or physical activity? Yes    2.  How would you describe your mood or emotional well-being today? good    3.  Have you had any falls in the last year? No    4.  Have you noticed any problems with your balance or had difficulty walking? No    5.  In the last six months have you experienced any leakage of urine? Yes    6. DPA/Advanced Directive: Patient has Advanced Directive on file.     Screening:      Depression Screening    Little interest or pleasure in doing things?  0 - not at all  Feeling down, depressed, or  hopeless? 0 - not at all  Patient Health Questionnaire Score: 0    If depressive symptoms identified deferred to follow up visit unless specifically addressed in assessment and plan.    Interpretation of PHQ-9 Total Score   Score Severity   1-4 No Depression   5-9 Mild Depression   10-14 Moderate Depression   15-19 Moderately Severe Depression   20-27 Severe Depression    Screening for Cognitive Impairment    Three Minute Recall (village, kitchen, baby)  1/3 Village Kitchen Baby  [place] clock face with all 12 numbers and set the hands to show 10 past 10.  Yes 10:10  5/5  If cognitive concerns identified, deferred for follow up unless specifically addressed in assessment and plan.    Fall Risk Assessment    Has the patient had two or more falls in the last year or any fall with injury in the last year?  No  If fall risk identified, deferred for follow up unless specifically addressed in assessment and plan.    Safety Assessment    Throw rugs on floor.  Yes  Handrails on all stairs.  Yes  Good lighting in all hallways.  Yes  Difficulty hearing.  No  Patient counseled about all safety risks that were identified.    Functional Assessment ADLs    Are there any barriers preventing you from cooking for yourself or meeting nutritional needs?  No.    Are there any barriers preventing you from driving safely or obtaining transportation?  No.  Drives  Are there any barriers preventing you from using a telephone or calling for help?  No.    Are there any barriers preventing you from shopping?  No.    Are there any barriers preventing you from taking care of your own finances?  No.    Are there any barriers preventing you from managing your medications?  No.    Are there any barriers preventing you from showering, bathing or dressing yourself?  No.    Are you currently engaging in any exercise or physical activity?  No.     What is your perception of your health?  Good.    Health Maintenance Summary                Annual  Wellness Visit Overdue 1950     HEPATITIS C SCREENING Overdue 1950     IMM DTaP/Tdap/Td Vaccine Overdue 3/23/1961     COLONOSCOPY Overdue 3/23/2000     BONE DENSITY Overdue 3/23/2015      Done 9/22/2005 DS-BONE DENSITY STUDY (DEXA)    MAMMOGRAM Next Due 3/3/2020      Done 9/3/2019 MA-DIAGNOSTIC MAMMO LEFT W/TOMOSYNTHESIS W/CAD     Patient has more history with this topic...    IMM ZOSTER VACCINES Postponed 3/7/2020 Originally 3/23/2000. System: vaccine not available, other system reasons          Patient Care Team:  Sara Mei D.O. as PCP - General (Family Medicine)  Vein [place]  as Consulting Physician (Vascular Surgery)  Lea Perez PT (Inactive) as Physical Therapy (Physical Therapy)    Social History     Tobacco Use   • Smoking status: Never Smoker   • Smokeless tobacco: Never Used   Substance Use Topics   • Alcohol use: No   • Drug use: No     Family History   Problem Relation Age of Onset   • Lung Disease Mother         COPD    • Heart Attack Father    • No Known Problems Sister    • No Known Problems Brother    • No Known Problems Maternal Grandmother    • No Known Problems Maternal Grandfather    • No Known Problems Paternal Grandmother    • Heart Attack Paternal Grandfather    • Heart Disease Daughter    • No Known Problems Daughter    • Heart Disease Son      She  has a past medical history of Cancer (HCC) (2012), Dental disorder (09/11/2019), Diverticulitis, Hypertension, Morbid obesity ([facility]), Obesity, TOMI (obstructive sleep apnea), PE (pulmonary thromboembolism) ([facility]), Urinary bladder disorder, Urinary incontinence (09/11/2019), Venous insufficiency of both lower extremities, and VSD (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 She also has no past medical history of Diabetes (HCC) or Renal disorder.   Past Surgical History:   Procedure Laterality Date   • UMBILICAL HERNIA REPAIR  9/17/2019    Procedure: REPAIR, HERNIA, UMBILICAL- INCARCERATED;  Surgeon: Truong FOREMAN  "RAFAELA Daniels;  Location: SURGERY SAME DAY [facility];  Service: General   • KNEE ARTHROPLASTY TOTAL Right 2014   • HYSTERECTOMY, VAGINAL     • VEIN STRIPPING             Exam:     /70 (BP Location: Left arm, Patient Position: Sitting, BP Cuff Size: Adult)   Pulse 77   Temp 36.6 °C (97.9 °F) (Temporal)   Resp 16   Ht 1.651 m (5' 5\")   Wt 117.9 kg (260 lb)   SpO2 94%  Body mass index is 43.27 kg/m².    Hearing excellent.    Dentition fair  Alert, oriented in no acute distress.  Eye contact is good, speech goal directed, affect calm    General: Well appearing, NAD  Pulmonary: Clear to ausculation.  Normal effort. No rales, ronchi, or wheezing.  Cardiovascular: Regular rate and rhythm without murmur, rubs or gallop.   Skin: Warm and dry.  Chronic venous stasis changes noted on lower extremities bilaterally.  Mild erythema and warmth noted over right anterior shin.  Musculoskeletal:  Chronic unchanged lower extremity edema.   Psych: Normal mood and affect. Alert and oriented. Judgment and insight is normal.    Assessment and Plan. The following treatment and monitoring plan is recommended:      Medicare annual wellness visit    1. Venous insufficiency of both lower extremities  Chronic and stable issue. She does not have any new concerns today. Continue HCTZ.     2. Morbid obesity (HCC)  Patient's body mass index is 43.27 kg/m².  Patient remains somewhat limited in ability to exercise due to venous stasis and severe lower extremity edema.    3. Coagulopathy (HCC)  4. History of pulmonary embolism  Previous issue, no current concerns.  She is not currently anticoagulated at this time.  IVC filter in place.    5. Essential hypertension  Blood pressure is stable and within goal on her current dosage. Her cough may be related to lisinopril use. Plan for Flonase for the next month, and switch to losartan if her symptoms do not improve.   - lisinopril (PRINIVIL) 30 MG tablet; Take 1 Tab by mouth every day.  " Dispense: 100 Tab; Refill: 3  - Comp Metabolic Panel; Future    6. TOMI (obstructive sleep apnea)  Patient continues to decline CPAP.     7. History of diverticulitis  Chronic and stable. No acute concerns today.     8. Presence of inferior vena cava filter  Stable and doing well. No acute concerns.     9. Lower extremity edema  Chronic issues likely related to her recurrent cellulitis of her lower extremities.     10. Chronic respiratory failure with hypoxia (HCC)  Patient continues to decline O2 supplementation.     11. History of breast lump  Last mammogram in 9/2019 showed evidence of chronic, benign right breast calcifications. Repeat exam ordered.     12. History of basal cell cancer  Chronic and stable. No acute complaints today.     13. Prediabetes  She will continue to manage this through her own efforts. Repeat labs ordered.   - HEMOGLOBIN A1C; Future    14. Other hyperlipidemia  15. Pure hypercholesterolemia  Chronic issue, not currently on statin repeat labs ordered.  May need to recommend statin in the future based on ASCVD risk score.  - Lipid Profile; Future    The 10-year ASCVD risk score (Aurora DC Jr., et al., 2013) is: 11.5%    16. Ventral hernia without obstruction or gangrene  S/p hernia repair. No signs of infection. Patient is doing well. Continue to monitor.     18. VSD (ventricular septal defect)  Chronic and stable, no current concerns. Continue to monitor.     18. Cellulitis of right lower extremity  Patient has an area on her right anterior shin concerning for cellulitis.  She currently has no systemic symptoms.  She has responded well to amoxicillin in the past for her lower extremity cellulitis.  - amoxicillin (AMOXIL) 500 MG Cap; Take 1 Cap by mouth 3 times a day for 7 days.  Dispense: 21 Cap; Refill: 0    19. Cough  20. Sinus headache  Flonase prescribed. This may be secondary to PND given her throat irritation and sinus congestion. However, if her symptoms do not improve after about 1  month, we will consider switching her from Lisinopril to Losartan.  - fluticasone (FLONASE) 50 MCG/ACT nasal spray; Spray 1 Spray in nose every day.  Dispense: 16 g; Refill: 3    21. Encounter for screening mammogram for breast cancer  - MA-DIAGNOSTIC MAMMO BILAT W/TOMOSYNTHESIS W/O CAD; Future    22. Screening for colorectal cancer  - OCCULT BLOOD FECES IMMUNOASSAY (FIT); Future    23. Need for hepatitis C screening test  - HCV Scrn ( [id number] 1xLife); Future    24. Encounter for preventive care  - CBC WITH DIFFERENTIAL; Future  - VITAMIN D,25 HYDROXY; Future    25. Abnormal mammogram of left breast  See above.     26. Vision changes  Referral placed.   - REFERRAL TO OPHTHALMOLOGY       Services suggested: No services needed at this time  Health Care Screening recommendations as per orders if indicated.  Referrals offered: PT/OT/Nutrition counseling/Behavioral Health/Smoking cessation as per orders if indicated.    Discussion today about general wellness and lifestyle habits:    · Prevent falls and reduce trip hazards; Cautioned about securing or removing rugs.  · Have a working fire alarm and carbon monoxide detector;   · Engage in regular physical activity and social activities       Follow-up: Follow-up in 3 to 4 months or sooner if needed.    Dean BARKER (Scribe), am scribing for, and in the presence of, Sara Mei DO.    Electronically signed by: Dean Harris (Michelle), 3/2/2020     Sara BARKER DO personally performed the services described in this documentation, as scribed by Dean Harris in my presence, and it is both accurate and complete.

## 2020-03-25 ENCOUNTER — PATIENT OUTREACH (OUTPATIENT)
Dept: HEALTH INFORMATION MANAGEMENT | Facility: OTHER | Age: 70
End: 2020-03-25

## 2020-03-25 ENCOUNTER — TELEPHONE (OUTPATIENT)
Dept: MEDICAL GROUP | Facility: PHYSICIAN GROUP | Age: 70
End: 2020-03-25

## 2020-03-25 ENCOUNTER — OFFICE VISIT (OUTPATIENT)
Dept: MEDICAL GROUP | Facility: PHYSICIAN GROUP | Age: 70
End: 2020-03-25
Payer: MEDICARE

## 2020-03-25 VITALS
WEIGHT: 258 LBS | HEART RATE: 77 BPM | SYSTOLIC BLOOD PRESSURE: 124 MMHG | DIASTOLIC BLOOD PRESSURE: 68 MMHG | BODY MASS INDEX: 44.05 KG/M2 | OXYGEN SATURATION: 95 % | HEIGHT: 64 IN | TEMPERATURE: 98.1 F

## 2020-03-25 DIAGNOSIS — L03.116 CELLULITIS OF LEFT LOWER EXTREMITY: ICD-10-CM

## 2020-03-25 PROCEDURE — 99214 OFFICE O/P EST MOD 30 MIN: CPT | Performed by: FAMILY MEDICINE

## 2020-03-25 RX ORDER — AMOXICILLIN 500 MG/1
500 CAPSULE ORAL 3 TIMES DAILY
Qty: 30 CAP | Refills: 0 | Status: SHIPPED | OUTPATIENT
Start: 2020-03-25 | End: 2020-04-04

## 2020-03-25 SDOH — ECONOMIC STABILITY: TRANSPORTATION INSECURITY
IN THE PAST 12 MONTHS, HAS LACK OF TRANSPORTATION KEPT YOU FROM MEETINGS, WORK, OR FROM GETTING THINGS NEEDED FOR DAILY LIVING?: NO

## 2020-03-25 SDOH — ECONOMIC STABILITY: TRANSPORTATION INSECURITY
IN THE PAST 12 MONTHS, HAS THE LACK OF TRANSPORTATION KEPT YOU FROM MEDICAL APPOINTMENTS OR FROM GETTING MEDICATIONS?: NO

## 2020-03-25 SDOH — ECONOMIC STABILITY: FOOD INSECURITY: WITHIN THE PAST 12 MONTHS, YOU WORRIED THAT YOUR FOOD WOULD RUN OUT BEFORE YOU GOT MONEY TO BUY MORE.: NEVER TRUE

## 2020-03-25 SDOH — ECONOMIC STABILITY: FOOD INSECURITY: WITHIN THE PAST 12 MONTHS, THE FOOD YOU BOUGHT JUST DIDN'T LAST AND YOU DIDN'T HAVE MONEY TO GET MORE.: NEVER TRUE

## 2020-03-25 ASSESSMENT — FIBROSIS 4 INDEX: FIB4 SCORE: 1.16

## 2020-03-25 NOTE — TELEPHONE ENCOUNTER
Patient states that you told her to let you know if her leg was not getting better. It is not, she states she needs more antibiotics. Please advise. Thank you.

## 2020-03-25 NOTE — PROGRESS NOTES
CC: Skin infection    HISTORY OF THE PRESENT ILLNESS: Patient is a 70 y.o. female. This pleasant patient is here today to discuss the following issue.    Patient is here today with concerns for skin infection.  This is been a recurrent issue for her with cellulitis of her lower extremities.  She does have severe venous insufficiency and severe lower extremity edema bilaterally.  She has seen vascular surgery in the past and they did not have much to offer her.  She cannot fit into compression hose.  She does not elevate her feet very much.  She was actually just recently treated for cellulitis of her right lower extremity on March 2 with 7-day course of amoxicillin.  She reports that the cellulitis did actually completely resolve quite quickly.  It seemed to have come back quite quickly yesterday.  Area of redness and warmth is on the right medial shin up to about mid shin.  This is the same place that she always gets her cellulitis.  She has no fevers chills or systemic symptoms at this time.    Allergies: Mustard [allyl isothiocyanate] and Prochlorperazine edisylate [compazine]    Current Outpatient Medications Ordered in Epic   Medication Sig Dispense Refill   • amoxicillin (AMOXIL) 500 MG Cap Take 1 Cap by mouth 3 times a day for 10 days. 30 Cap 0   • fluticasone (FLONASE) 50 MCG/ACT nasal spray Spray 1 Spray in nose every day. 16 g 3   • lisinopril (PRINIVIL) 30 MG tablet Take 1 Tab by mouth every day. 100 Tab 3   • hydroCHLOROthiazide (HYDRODIURIL) 25 MG Tab Take 1 Tab by mouth every day. 90 Tab 1     No current Epic-ordered facility-administered medications on file.        Past Medical History:   Diagnosis Date   • Cancer ([facility]) 2012    skin   • Dental disorder 09/11/2019    upper denture   • Diverticulitis    • Hypertension    • Morbid obesity ([facility])    • Obesity    • TOMI (obstructive sleep apnea)    • PE (pulmonary thromboembolism) ([facility])    • Urinary bladder disorder    • Urinary incontinence 09/11/2019   •  "Venous insufficiency of both lower extremities    • VSD (ventricular septal defect and aortic arch hypoplasia        Past Surgical History:   Procedure Laterality Date   • UMBILICAL HERNIA REPAIR  9/17/2019    Procedure: REPAIR, HERNIA, UMBILICAL- INCARCERATED;  Surgeon: Truong Daniels M.D.;  Location: SURGERY SAME DAY [facility];  Service: General   • KNEE ARTHROPLASTY TOTAL Right 2014   • HYSTERECTOMY, VAGINAL     • VEIN STRIPPING         Social History     Tobacco Use   • Smoking status: Never Smoker   • Smokeless tobacco: Never Used   Substance Use Topics   • Alcohol use: No   • Drug use: No       Social History     Social History Narrative   • Not on file       Family History   Problem Relation Age of Onset   • Lung Disease Mother         COPD    • Heart Attack Father    • No Known Problems Sister    • No Known Problems Brother    • No Known Problems Maternal Grandmother    • No Known Problems Maternal Grandfather    • No Known Problems Paternal Grandmother    • Heart Attack Paternal Grandfather    • Heart Disease Daughter    • No Known Problems Daughter    • Heart Disease Son        ROS:     - Constitutional: Negative for fever, chills, unexpected weight change, and fatigue/generalized weakness.     - Respiratory: Negative for cough, sputum production, chest congestion, dyspnea, wheezing, and crackles.      - Cardiovascular: Negative for chest pain, palpitations, orthopnea, PND, and bilateral lower extremity edema.     Exam: /68 (BP Location: Left arm, Patient Position: Sitting, BP Cuff Size: Large adult)   Pulse 77   Temp 36.7 °C (98.1 °F) (Temporal)   Ht 1.626 m (5' 4\")   Wt 117 kg (258 lb)   SpO2 95%  Body mass index is 44.29 kg/m².    General: Well appearing, NAD  Pulmonary: Clear to ausculation.  Normal effort. No rales, ronchi, or wheezing.  Cardiovascular: Regular rate and rhythm without murmur, rubs or gallop.  Dense pitting edema of lower extremities bilaterally which is " chronic.  Skin: Warm and dry.  Right medial shin warm and erythematous up to about mid shin.    Musculoskeletal:  No extremity cyanosis, clubbing, or edema.  Psych: Normal mood and affect. Alert and oriented. Judgment and insight is normal.    Please note that this dictation was created using voice recognition software. I have made every reasonable attempt to correct obvious errors, but I expect that there are errors of grammar and possibly content that I did not discover before finalizing the note.      Assessment/Plan  Fior was seen today for leg pain.    Diagnoses and all orders for this visit:    Cellulitis of left lower extremity  Acute but recurrent issue for the patient.  Presentation consistent with cellulitis again.  We will go ahead and treat with amoxicillin at this time as she has tolerated it well in the past with resolution of her cellulitis.  We did discuss that having significant edema does put her at risk for recurrent skin breakdown and skin infections.  I have asked that she try to keep her feet elevated as much as she can when she is seated which she does agree to.  Unfortunately she is not able to fit into compression hose.  She agrees to let me know if cellulitis does not resolve.  Of course she agrees to go to the emergency department if area of redness rapidly spreads.  -     amoxicillin (AMOXIL) 500 MG Cap; Take 1 Cap by mouth 3 times a day for 10 days.    Follow-up if symptoms not improving or in 3 months.    Sara Mei, DO  [facility]

## 2020-03-25 NOTE — PROGRESS NOTES
1. HealthConnect Verified: yes    2. Verify PCP: yes    3. Review and add  to Care Team: yes        5. Reviewed/Updated the following with patient:       •   Communication Preference Obtained? YES  • MyChart Activation: already active       •   E-Mail Address Obtained? YES       •   Appointment Day and Time Preferences? YES       •   Preferred Pharmacy? YES       •   Preferred Lab? YES            Called member to introduce myself as their SCP  and to wish a Happy Birthday. Member had no questions at this time, direct phone number given for future contact.

## 2020-05-04 ENCOUNTER — HOSPITAL ENCOUNTER (OUTPATIENT)
Dept: LAB | Facility: MEDICAL CENTER | Age: 70
End: 2020-05-04
Attending: FAMILY MEDICINE
Payer: MEDICARE

## 2020-05-04 DIAGNOSIS — E78.00 PURE HYPERCHOLESTEROLEMIA: ICD-10-CM

## 2020-05-04 DIAGNOSIS — Z00.00 ENCOUNTER FOR PREVENTIVE CARE: ICD-10-CM

## 2020-05-04 DIAGNOSIS — R73.03 PREDIABETES: ICD-10-CM

## 2020-05-04 DIAGNOSIS — Z12.11 SCREENING FOR COLORECTAL CANCER: ICD-10-CM

## 2020-05-04 DIAGNOSIS — I10 ESSENTIAL HYPERTENSION: ICD-10-CM

## 2020-05-04 DIAGNOSIS — Z12.12 SCREENING FOR COLORECTAL CANCER: ICD-10-CM

## 2020-05-04 DIAGNOSIS — Z11.59 NEED FOR HEPATITIS C SCREENING TEST: ICD-10-CM

## 2020-05-04 LAB
25(OH)D3 SERPL-MCNC: 17 NG/ML (ref 30–100)
ALBUMIN SERPL BCP-MCNC: 4 G/DL (ref 3.2–4.9)
ALBUMIN/GLOB SERPL: 1.1 G/DL
ALP SERPL-CCNC: 91 U/L (ref 30–99)
ALT SERPL-CCNC: 8 U/L (ref 2–50)
ANION GAP SERPL CALC-SCNC: 13 MMOL/L (ref 7–16)
AST SERPL-CCNC: 11 U/L (ref 12–45)
BASOPHILS # BLD AUTO: 0.8 % (ref 0–1.8)
BASOPHILS # BLD: 0.06 K/UL (ref 0–0.12)
BILIRUB SERPL-MCNC: 0.5 MG/DL (ref 0.1–1.5)
BUN SERPL-MCNC: 16 MG/DL (ref 8–22)
CALCIUM SERPL-MCNC: 9.1 MG/DL (ref 8.5–10.5)
CHLORIDE SERPL-SCNC: 101 MMOL/L (ref 96–112)
CHOLEST SERPL-MCNC: 208 MG/DL (ref 100–199)
CO2 SERPL-SCNC: 22 MMOL/L (ref 20–33)
CREAT SERPL-MCNC: 0.71 MG/DL (ref 0.5–1.4)
EOSINOPHIL # BLD AUTO: 0.3 K/UL (ref 0–0.51)
EOSINOPHIL NFR BLD: 3.8 % (ref 0–6.9)
ERYTHROCYTE [DISTWIDTH] IN BLOOD BY AUTOMATED COUNT: 45.7 FL (ref 35.9–50)
EST. AVERAGE GLUCOSE BLD GHB EST-MCNC: 120 MG/DL
GLOBULIN SER CALC-MCNC: 3.5 G/DL (ref 1.9–3.5)
GLUCOSE SERPL-MCNC: 95 MG/DL (ref 65–99)
HBA1C MFR BLD: 5.8 % (ref 0–5.6)
HCT VFR BLD AUTO: 42.2 % (ref 37–47)
HCV AB SER QL: NORMAL
HDLC SERPL-MCNC: 48 MG/DL
HGB BLD-MCNC: 13.6 G/DL (ref 12–16)
IMM GRANULOCYTES # BLD AUTO: 0.05 K/UL (ref 0–0.11)
IMM GRANULOCYTES NFR BLD AUTO: 0.6 % (ref 0–0.9)
LDLC SERPL CALC-MCNC: 136 MG/DL
LYMPHOCYTES # BLD AUTO: 2.52 K/UL (ref 1–4.8)
LYMPHOCYTES NFR BLD: 32.1 % (ref 22–41)
MCH RBC QN AUTO: 30 PG (ref 27–33)
MCHC RBC AUTO-ENTMCNC: 32.2 G/DL (ref 33.6–35)
MCV RBC AUTO: 93 FL (ref 81.4–97.8)
MONOCYTES # BLD AUTO: 0.69 K/UL (ref 0–0.85)
MONOCYTES NFR BLD AUTO: 8.8 % (ref 0–13.4)
NEUTROPHILS # BLD AUTO: 4.24 K/UL (ref 2–7.15)
NEUTROPHILS NFR BLD: 53.9 % (ref 44–72)
NRBC # BLD AUTO: 0 K/UL
NRBC BLD-RTO: 0 /100 WBC
PLATELET # BLD AUTO: 272 K/UL (ref 164–446)
PMV BLD AUTO: 9.4 FL (ref 9–12.9)
POTASSIUM SERPL-SCNC: 4.4 MMOL/L (ref 3.6–5.5)
PROT SERPL-MCNC: 7.5 G/DL (ref 6–8.2)
RBC # BLD AUTO: 4.54 M/UL (ref 4.2–5.4)
SODIUM SERPL-SCNC: 136 MMOL/L (ref 135–145)
TRIGL SERPL-MCNC: 119 MG/DL (ref 0–149)
WBC # BLD AUTO: 7.9 K/UL (ref 4.8–10.8)

## 2020-05-04 PROCEDURE — 36415 COLL VENOUS BLD VENIPUNCTURE: CPT

## 2020-05-04 PROCEDURE — 85025 COMPLETE CBC W/AUTO DIFF WBC: CPT

## 2020-05-04 PROCEDURE — 80053 COMPREHEN METABOLIC PANEL: CPT

## 2020-05-04 PROCEDURE — 82306 VITAMIN D 25 HYDROXY: CPT

## 2020-05-04 PROCEDURE — 83036 HEMOGLOBIN GLYCOSYLATED A1C: CPT

## 2020-05-04 PROCEDURE — G0472 HEP C SCREEN HIGH RISK/OTHER: HCPCS

## 2020-05-04 PROCEDURE — 80061 LIPID PANEL: CPT

## 2020-05-05 ENCOUNTER — TELEPHONE (OUTPATIENT)
Dept: MEDICAL GROUP | Facility: PHYSICIAN GROUP | Age: 70
End: 2020-05-05

## 2020-05-05 NOTE — TELEPHONE ENCOUNTER
----- Message from Sara Mei D.O. sent at 2020  7:56 AM PDT -----  Please call patient let her know the labs were all normal/stable with the exception of the followin.  Low vitamin D levels.  Based on her vitamin D levels if she is not ready taking a supplement and recommend she take 2000 units daily.  If she is taking a supplement, I recommend that she add another 1000 to 2000 units daily on top of what she is already taking.    2.  Cholesterol has increased slightly from her last check.  It still remains just mildly elevated.    3.  Sugars remain in the prediabetes range but are slightly improved from her last check.

## 2020-05-13 ENCOUNTER — OFFICE VISIT (OUTPATIENT)
Dept: MEDICAL GROUP | Facility: PHYSICIAN GROUP | Age: 70
End: 2020-05-13
Payer: MEDICARE

## 2020-05-13 VITALS
WEIGHT: 257 LBS | DIASTOLIC BLOOD PRESSURE: 68 MMHG | OXYGEN SATURATION: 95 % | BODY MASS INDEX: 43.87 KG/M2 | TEMPERATURE: 98.4 F | HEART RATE: 72 BPM | HEIGHT: 64 IN | SYSTOLIC BLOOD PRESSURE: 126 MMHG

## 2020-05-13 DIAGNOSIS — L03.115 CELLULITIS OF RIGHT LOWER EXTREMITY: ICD-10-CM

## 2020-05-13 PROCEDURE — 99214 OFFICE O/P EST MOD 30 MIN: CPT | Performed by: FAMILY MEDICINE

## 2020-05-13 RX ORDER — AMOXICILLIN 500 MG/1
500 CAPSULE ORAL 3 TIMES DAILY
Qty: 36 CAP | Refills: 0 | Status: SHIPPED | OUTPATIENT
Start: 2020-05-13 | End: 2020-05-25

## 2020-05-13 ASSESSMENT — FIBROSIS 4 INDEX: FIB4 SCORE: 1

## 2020-05-13 NOTE — PROGRESS NOTES
CC: cellulitis    HISTORY OF THE PRESENT ILLNESS: Patient is a 70 y.o. female. This pleasant patient is here today to discuss the following health issue.     Patient is here today with concern for recurrent lower extremity cellulitis.  This is been an ongoing and recurrent issue for her.  She was last treated with amoxicillin for this issue.  States that it seemed to go away for about 4 to 5 days completely, then come right back.  She is unsure whether or not it completely resolved after her last 7-day course of amoxicillin.  Recently started coming back again about 2 weeks ago.  She has pain, erythema and warmth on the anteromedial portion of her right calf.  Denies fevers, chills, fatigue or flulike symptoms.  The redness has occurred, though not especially spread much at this time.    Allergies: Mustard [allyl isothiocyanate] and Prochlorperazine edisylate [compazine]    Current Outpatient Medications Ordered in Epic   Medication Sig Dispense Refill   • amoxicillin (AMOXIL) 500 MG Cap Take 1 Cap by mouth 3 times a day for 12 days. 36 Cap 0   • fluticasone (FLONASE) 50 MCG/ACT nasal spray Spray 1 Spray in nose every day. 16 g 3   • lisinopril (PRINIVIL) 30 MG tablet Take 1 Tab by mouth every day. 100 Tab 3   • hydroCHLOROthiazide (HYDRODIURIL) 25 MG Tab Take 1 Tab by mouth every day. 90 Tab 1     No current Epic-ordered facility-administered medications on file.        Past Medical History:   Diagnosis Date   • Cancer ([facility]ly [facility]) 2012    skin   • Dental disorder 09/11/2019    upper denture   • Diverticulitis    • Hypertension    • Morbid obesity ([facility])    • Obesity    • TOMI (obstructive sleep apnea)    • PE (pulmonary thromboembolism) ([facility]ly [facility])    • Urinary bladder disorder    • Urinary incontinence 09/11/2019   • Venous insufficiency of both lower extremities    • VSD (ventricular septal defect and aortic arch hypoplasia        Past Surgical History:   Procedure Laterality Date   • UMBILICAL HERNIA REPAIR  9/17/2019     "Procedure: REPAIR, HERNIA, UMBILICAL- INCARCERATED;  Surgeon: Truong Daniels M.D.;  Location: SURGERY SAME DAY [facility];  Service: General   • KNEE ARTHROPLASTY TOTAL Right 2014   • HYSTERECTOMY, VAGINAL     • VEIN STRIPPING         Social History     Tobacco Use   • Smoking status: Never Smoker   • Smokeless tobacco: Never Used   Substance Use Topics   • Alcohol use: No   • Drug use: No       Social History     Social History Narrative   • Not on file       Family History   Problem Relation Age of Onset   • Lung Disease Mother         COPD    • Heart Attack Father    • No Known Problems Sister    • No Known Problems Brother    • No Known Problems Maternal Grandmother    • No Known Problems Maternal Grandfather    • No Known Problems Paternal Grandmother    • Heart Attack Paternal Grandfather    • Heart Disease Daughter    • No Known Problems Daughter    • Heart Disease Son        ROS:     - Constitutional: Negative for fever, chills, unexpected weight change, and fatigue/generalized weakness.     - Respiratory: Negative for cough, sputum production, chest congestion, dyspnea, wheezing, and crackles.      - Cardiovascular: Negative for chest pain, palpitations, orthopnea, PND.      Exam: /68 (BP Location: Right arm, Patient Position: Sitting, BP Cuff Size: Large adult)   Pulse 72   Temp 36.9 °C (98.4 °F) (Temporal)   Ht 1.626 m (5' 4\")   Wt 116.6 kg (257 lb)   SpO2 95%  Body mass index is 44.11 kg/m².    General: Well appearing, NAD  Ext: Significant lower extremity edema noted in bilateral legs, which is chronic in nature.  Right leg with erythema and warmth noted from ankle to about mid shin extending anteromedially.  Psych: Normal mood and affect. Alert and oriented. Judgment and insight is normal.    Please note that this dictation was created using voice recognition software. I have made every reasonable attempt to correct obvious errors, but I expect that there are errors of grammar and " possibly content that I did not discover before finalizing the note.      Assessment/Plan  Fior was seen today for other and edema.    Diagnoses and all orders for this visit:    Cellulitis of right lower extremity  New problem for the patient.  She has responded well to amoxicillin in the past.  Recommend continuing amoxicillin but for longer course.  If not improving or continues to be recurrent, would recommend that she get back in with vascular doctor.  -     amoxicillin (AMOXIL) 500 MG Cap; Take 1 Cap by mouth 3 times a day for 12 days.      Follow-up in 3 months or sooner if symptoms not improving.    Sara Mei, DO  [facility]

## 2020-05-29 ENCOUNTER — OFFICE VISIT (OUTPATIENT)
Dept: MEDICAL GROUP | Facility: PHYSICIAN GROUP | Age: 70
End: 2020-05-29
Payer: MEDICARE

## 2020-05-29 ENCOUNTER — TELEPHONE (OUTPATIENT)
Dept: MEDICAL GROUP | Facility: PHYSICIAN GROUP | Age: 70
End: 2020-05-29

## 2020-05-29 VITALS
OXYGEN SATURATION: 94 % | HEART RATE: 72 BPM | TEMPERATURE: 97.2 F | DIASTOLIC BLOOD PRESSURE: 70 MMHG | WEIGHT: 256 LBS | HEIGHT: 64 IN | BODY MASS INDEX: 43.71 KG/M2 | SYSTOLIC BLOOD PRESSURE: 120 MMHG

## 2020-05-29 DIAGNOSIS — L03.119 RECURRENT CELLULITIS OF LOWER EXTREMITY: ICD-10-CM

## 2020-05-29 DIAGNOSIS — I87.2 VENOUS INSUFFICIENCY OF BOTH LOWER EXTREMITIES: Chronic | ICD-10-CM

## 2020-05-29 PROCEDURE — 99214 OFFICE O/P EST MOD 30 MIN: CPT | Performed by: FAMILY MEDICINE

## 2020-05-29 RX ORDER — CEPHALEXIN 500 MG/1
500 CAPSULE ORAL 4 TIMES DAILY
Qty: 20 CAP | Refills: 0 | Status: SHIPPED | OUTPATIENT
Start: 2020-05-29 | End: 2020-06-03

## 2020-05-29 ASSESSMENT — FIBROSIS 4 INDEX: FIB4 SCORE: 1

## 2020-05-29 NOTE — PROGRESS NOTES
CC: Lower extremity cellulitis    HISTORY OF THE PRESENT ILLNESS: Patient is a 70 y.o. female. This pleasant patient is here today to discuss the following issue.    Patient is here today to discuss cellulitis of her right lower extremity.  She does have severe chronic venous stasis and lymphedema of that leg.  Chronic inflammation to the skin as well.  She was recently seen on May 13, 2020 and prescribed amoxicillin for a full 12-day course, as in the past she is not responded well to a short course.  The redness has gone down somewhat, but leg remains very tender to touch and warm.  She does have a history of vein stripping.  This was done many years ago in [place].  She was apparently told at some point that she had another procedure, but never followed through with it.  She is unable to get compression stockings on her leg.  Also previously referred to lymphedema physical therapy, but was unable to afford it.    Allergies: Mustard [allyl isothiocyanate] and Prochlorperazine edisylate [compazine]    Current Outpatient Medications Ordered in Epic   Medication Sig Dispense Refill   • cephALEXin (KEFLEX) 500 MG Cap Take 1 Cap by mouth 4 times a day for 5 days. 20 Cap 0   • lisinopril (PRINIVIL) 30 MG tablet Take 1 Tab by mouth every day. 100 Tab 3   • hydroCHLOROthiazide (HYDRODIURIL) 25 MG Tab Take 1 Tab by mouth every day. 90 Tab 1     No current Epic-ordered facility-administered medications on file.        Past Medical History:   Diagnosis Date   • Cancer ([facility]) 2012    skin   • Dental disorder 09/11/2019    upper denture   • Diverticulitis    • Hypertension    • Morbid obesity ([facility])    • Obesity    • TOMI (obstructive sleep apnea)    • PE (pulmonary thromboembolism) ([facility])    • Urinary bladder disorder    • Urinary incontinence 09/11/2019   • Venous insufficiency of both lower extremities    • VSD (ventricular septal defect and aortic arch hypoplasia        Past Surgical History:   Procedure Laterality Date   •  "UMBILICAL HERNIA REPAIR  9/17/2019    Procedure: REPAIR, HERNIA, UMBILICAL- INCARCERATED;  Surgeon: Truong Daniels M.D.;  Location: SURGERY SAME DAY [facility];  Service: General   • KNEE ARTHROPLASTY TOTAL Right 2014   • HYSTERECTOMY, VAGINAL     • VEIN STRIPPING         Social History     Tobacco Use   • Smoking status: Never Smoker   • Smokeless tobacco: Never Used   Substance Use Topics   • Alcohol use: No   • Drug use: No       Social History     Social History Narrative   • Not on file     Is going on  Family History   Problem Relation Age of Onset   • Lung Disease Mother         COPD    • Heart Attack Father    • No Known Problems Sister    • No Known Problems Brother    • No Known Problems Maternal Grandmother    • No Known Problems Maternal Grandfather    • No Known Problems Paternal Grandmother    • Heart Attack Paternal Grandfather    • Heart Disease Daughter    • No Known Problems Daughter    • Heart Disease Son        ROS:     - Constitutional: Negative for fever, chills, unexpected weight change, and fatigue/generalized weakness.     - Respiratory: Negative for cough, sputum production, chest congestion, dyspnea, wheezing, and crackles.      - Cardiovascular: Negative for chest pain, palpitations, orthopnea, PND, and bilateral lower extremity edema.     Exam: /70 (BP Location: Right arm, Patient Position: Sitting, BP Cuff Size: Large adult)   Pulse 72   Temp 36.2 °C (97.2 °F)   Ht 1.626 m (5' 4\")   Wt 116.1 kg (256 lb)   SpO2 94%  Body mass index is 43.94 kg/m².    General: Well appearing, NAD  Extremities: Right lower extremity with dense, severe, nonpitting lymphedema.  She has chronic venous stasis changes on her right lower extremity, particularly medially.  The anteromedial aspect of her leg is mildly erythematous but significantly improved from earlier this month when we treated her from cellulitis.  Nonetheless, there is still significant warmth and tenderness to " palpation.  Skin: Warm and dry.  No obvious lesions.  Musculoskeletal:  No extremity cyanosis, clubbing, or edema.  Psych: Normal mood and affect. Alert and oriented. Judgment and insight is normal.    Please note that this dictation was created using voice recognition software. I have made every reasonable attempt to correct obvious errors, but I expect that there are errors of grammar and possibly content that I did not discover before finalizing the note.      Assessment/Plan  Fior was seen today for edema.    Diagnoses and all orders for this visit:    Venous insufficiency of both lower extremities  Recurrent cellulitis of lower extremity  This is been an ongoing and recurrent issue for the patient.  At this point, I am not sure whether or not her lower extremity warmth and pain are related to her severe lymphedema versus unresolved cellulitis.  She has had recurrent cellulitis since care doctor.  I will go ahead and do 5 days of Keflex.  However, at this point I think she needs to the vascular specialist again given her severe edema and how recurrent her cellulitis has been.  She agrees to this.  Of course, red flags reviewed such as worsening or rapidly spreading redness, fevers or chills.  -     REFERRAL TO VASCULAR SURGERY  -     cephALEXin (KEFLEX) 500 MG Cap; Take 1 Cap by mouth 4 times a day for 5 days.    Follow-up in 4 to 6 months or sooner if symptoms not resolving.    Sara Mei,   [facility]

## 2020-06-15 ENCOUNTER — TELEPHONE (OUTPATIENT)
Dept: MEDICAL GROUP | Facility: PHYSICIAN GROUP | Age: 70
End: 2020-06-15

## 2020-06-15 ENCOUNTER — HOSPITAL ENCOUNTER (OUTPATIENT)
Dept: RADIOLOGY | Facility: MEDICAL CENTER | Age: 70
End: 2020-06-15
Attending: FAMILY MEDICINE
Payer: MEDICARE

## 2020-06-15 DIAGNOSIS — Z12.31 ENCOUNTER FOR SCREENING MAMMOGRAM FOR BREAST CANCER: ICD-10-CM

## 2020-06-15 PROCEDURE — G0279 TOMOSYNTHESIS, MAMMO: HCPCS | Mod: 50

## 2020-06-15 NOTE — TELEPHONE ENCOUNTER
----- Message from Sara Mei D.O. sent at 6/15/2020 10:15 AM PDT -----  Please call patient let her know that mammogram was stable from previous mammogram 6 months ago.  It is recommended that she have another 6-month follow-up for her mammogram which will put her around December 2020.  I have made a note of this in her chart.

## 2020-07-21 ENCOUNTER — OFFICE VISIT (OUTPATIENT)
Dept: MEDICAL GROUP | Facility: PHYSICIAN GROUP | Age: 70
End: 2020-07-21
Payer: MEDICARE

## 2020-07-21 VITALS
HEIGHT: 64 IN | DIASTOLIC BLOOD PRESSURE: 70 MMHG | OXYGEN SATURATION: 94 % | BODY MASS INDEX: 43.36 KG/M2 | HEART RATE: 72 BPM | SYSTOLIC BLOOD PRESSURE: 122 MMHG | TEMPERATURE: 98.4 F | WEIGHT: 254 LBS

## 2020-07-21 DIAGNOSIS — L02.91 ABSCESS: ICD-10-CM

## 2020-07-21 PROCEDURE — 99214 OFFICE O/P EST MOD 30 MIN: CPT | Mod: 25 | Performed by: FAMILY MEDICINE

## 2020-07-21 PROCEDURE — 10060 I&D ABSCESS SIMPLE/SINGLE: CPT | Performed by: FAMILY MEDICINE

## 2020-07-21 RX ORDER — SULFAMETHOXAZOLE AND TRIMETHOPRIM 800; 160 MG/1; MG/1
1 TABLET ORAL 2 TIMES DAILY
Qty: 10 TAB | Refills: 0 | Status: SHIPPED | OUTPATIENT
Start: 2020-07-21 | End: 2020-07-21

## 2020-07-21 RX ORDER — DOXYCYCLINE HYCLATE 100 MG
100 TABLET ORAL 2 TIMES DAILY
Qty: 14 TAB | Refills: 0 | Status: SHIPPED | OUTPATIENT
Start: 2020-07-21 | End: 2020-07-27

## 2020-07-21 ASSESSMENT — FIBROSIS 4 INDEX: FIB4 SCORE: 1

## 2020-07-21 NOTE — PROGRESS NOTES
CC: Abscess    HISTORY OF THE PRESENT ILLNESS: Patient is a 70 y.o. female. This pleasant patient is here today to discuss the following issue.    Patient is here today with concern for abscess in her right lateral chest wall/axilla area.  States is been there for about 3 weeks and is getting bigger and more painful.  Notes that she has had abscesses in the past which have drained on their own with hot compresses.  She has been using warm compresses, but they have not been helping.  She denies fevers or chills.  Surrounding area is getting fairly red.    Allergies: Mustard [allyl isothiocyanate] and Prochlorperazine edisylate [compazine]    Current Outpatient Medications Ordered in Epic   Medication Sig Dispense Refill   • doxycycline (VIBRAMYCIN) 100 MG Tab Take 1 Tab by mouth 2 times a day for 7 days. 14 Tab 0   • lisinopril (PRINIVIL) 30 MG tablet Take 1 Tab by mouth every day. 100 Tab 3   • hydroCHLOROthiazide (HYDRODIURIL) 25 MG Tab Take 1 Tab by mouth every day. 90 Tab 1     No current Epic-ordered facility-administered medications on file.        Past Medical History:   Diagnosis Date   • Cancer (HCC) 2012    skin   • Dental disorder 09/11/2019    upper denture   • Diverticulitis    • Hypertension    • Morbid obesity ([facility])    • Obesity    • TOMI (obstructive sleep apnea)    • PE (pulmonary thromboembolism) ([facility])    • Urinary bladder disorder    • Urinary incontinence 09/11/2019   • Venous insufficiency of both lower extremities    • VSD (ventricular septal defect and aortic arch hypoplasia        Past Surgical History:   Procedure Laterality Date   • UMBILICAL HERNIA REPAIR  9/17/2019    Procedure: REPAIR, HERNIA, UMBILICAL- INCARCERATED;  Surgeon: Truong Daniels M.D.;  Location: SURGERY SAME DAY [facility];  Service: General   • KNEE ARTHROPLASTY TOTAL Right 2014   • HYSTERECTOMY, VAGINAL     • VEIN STRIPPING         Social History     Tobacco Use   • Smoking status: Never Smoker   • Smokeless tobacco:  "Never Used   Substance Use Topics   • Alcohol use: No   • Drug use: No       Social History     Social History Narrative   • Not on file       Family History   Problem Relation Age of Onset   • Lung Disease Mother         COPD    • Heart Attack Father    • No Known Problems Sister    • No Known Problems Brother    • No Known Problems Maternal Grandmother    • No Known Problems Maternal Grandfather    • No Known Problems Paternal Grandmother    • Heart Attack Paternal Grandfather    • Heart Disease Daughter    • No Known Problems Daughter    • Heart Disease Son        ROS:     - Constitutional: Negative for fever, chills, unexpected weight change, and fatigue/generalized weakness.     - Respiratory: Negative for cough, sputum production, chest congestion, dyspnea, wheezing, and crackles.      - Cardiovascular: Negative for chest pain, palpitations, orthopnea, PND, and bilateral lower extremity edema.     Exam: /70 (BP Location: Left arm, Patient Position: Sitting, BP Cuff Size: Large adult)   Pulse 72   Temp 36.9 °C (98.4 °F) (Temporal)   Ht 1.626 m (5' 4\")   Wt 115.2 kg (254 lb)   SpO2 94%  Body mass index is 43.6 kg/m².    General: Well appearing, NAD  Skin: Warm and dry.  Right lateral chest/axilla area with notable abscess approximately 1 inch x 1 inch in diameter.  She has some mild surrounding redness extending just slightly beyond the abscess.  The abscess is within the subcutaneous/skin tissue and is not contiguous with the breast.  Musculoskeletal:  No extremity cyanosis, clubbing, or edema.  Psych: Normal mood and affect. Alert and oriented. Judgment and insight is normal.    Please note that this dictation was created using voice recognition software. I have made every reasonable attempt to correct obvious errors, but I expect that there are errors of grammar and possibly content that I did not discover before finalizing the note.      Assessment/Plan  Fior was seen today for " other.    Diagnoses and all orders for this visit:    Abscess  New problem for the patient.  Abscess incision and drainage performed as below.  There was a fairly small amount of purulent fluid expressed from the abscess after incision.  The surrounding area remains fairly indurated, but there is nothing else left for me to drain.  Going to go ahead and start her on doxycycline for 7 days and have her follow-up in 1 week.  (I am hesitant to use Bactrim due to the fact that she is on lisinopril and risk for hyperkalemia).  Patient agrees to call me if symptoms worsen over the next week.  Recommend dressing loosely with gauze and bathing as normally.  -     doxycycline (VIBRAMYCIN) 100 MG Tab; Take 1 Tab by mouth 2 times a day for 7 days.    Verbal informed consent obtained.  The area was prepped in the usual manner and the skin overlying the abscess was anesthetized with 1.5 cc of 2% lidocaine with epinephrine.  The area was sharply incised with 11 blade scalpel and approx 1 ccs of purulent material was obtained.     Follow-up in 1 week.    Sara Mei DO  [facility]

## 2020-07-22 ENCOUNTER — TELEPHONE (OUTPATIENT)
Dept: MEDICAL GROUP | Facility: PHYSICIAN GROUP | Age: 70
End: 2020-07-22

## 2020-07-22 NOTE — TELEPHONE ENCOUNTER
ESTABLISHED PATIENT PRE-VISIT PLANNING     Patient was NOT contacted to complete PVP.    1.  Reviewed notes from the last few office visits within the medical group: Yes    2.  If any orders were placed at last visit or intended to be done for this visit (i.e. 6 mos follow-up), do we have Results/Consult Notes?        •  Labs - Labs were not ordered at last office visit.       •  Imaging - Imaging was not ordered at last office visit.       •  Referrals - No referrals were ordered at last office visit.    3. Is this appointment scheduled as a Hospital Follow-Up? No    4.  Immunizations were updated in Epic using WebIZ?: UNABLE TO CHECK WEBIZ       •  Web Iz Recommendations:     5.  Patient is due for the following Health Maintenance Topics:   Health Maintenance Due   Topic Date Due   • IMM DTaP/Tdap/Td Vaccine (1 - Tdap) 03/23/1969   • COLONOSCOPY  03/23/2000   • BONE DENSITY  03/23/2015       6. Orders for overdue Health Maintenance topics pended in Pre-Charting? NO    7.  AHA (MDX) form printed for Provider? No, already completed    8.  Patient was NOT informed to arrive 15 min prior to their scheduled appointment and bring in their medication bottles.

## 2020-07-27 ENCOUNTER — OFFICE VISIT (OUTPATIENT)
Dept: MEDICAL GROUP | Facility: PHYSICIAN GROUP | Age: 70
End: 2020-07-27
Payer: MEDICARE

## 2020-07-27 VITALS
HEART RATE: 76 BPM | HEIGHT: 64 IN | WEIGHT: 264 LBS | DIASTOLIC BLOOD PRESSURE: 68 MMHG | SYSTOLIC BLOOD PRESSURE: 122 MMHG | TEMPERATURE: 98.5 F | BODY MASS INDEX: 45.07 KG/M2 | OXYGEN SATURATION: 96 %

## 2020-07-27 DIAGNOSIS — L02.91 ABSCESS: ICD-10-CM

## 2020-07-27 PROCEDURE — 99024 POSTOP FOLLOW-UP VISIT: CPT | Performed by: FAMILY MEDICINE

## 2020-07-27 RX ORDER — CLINDAMYCIN HYDROCHLORIDE 150 MG/1
300 CAPSULE ORAL 4 TIMES DAILY
Qty: 40 CAP | Refills: 0 | Status: SHIPPED | OUTPATIENT
Start: 2020-07-27 | End: 2020-08-01

## 2020-07-27 ASSESSMENT — FIBROSIS 4 INDEX: FIB4 SCORE: 1

## 2020-07-27 NOTE — PROGRESS NOTES
CC: Abscess    HISTORY OF THE PRESENT ILLNESS: Patient is a 70 y.o. female. This pleasant patient is here today for follow-up.    Patient is here today for follow-up on her abscess.  She was seen about 5 days ago and an abscess in her right axillary area.  It was drained at that time and she was put on doxycycline.  Patient notes that within 1 day of starting the doxycycline she developed a severe headache.  States that she stopped the doxycycline and headache got better.  Abscess continues to drain on its own over the course of the past 5 days, and now there is some minimal induration in the area where the abscess was and no surrounding erythema.  Pain has resolved.    Allergies: Doxycycline; Mustard [allyl isothiocyanate]; and Prochlorperazine edisylate [compazine]    Current Outpatient Medications Ordered in Epic   Medication Sig Dispense Refill   • clindamycin (CLEOCIN) 150 MG Cap Take 2 Caps by mouth 4 times a day for 5 days. 40 Cap 0   • lisinopril (PRINIVIL) 30 MG tablet Take 1 Tab by mouth every day. 100 Tab 3   • hydroCHLOROthiazide (HYDRODIURIL) 25 MG Tab Take 1 Tab by mouth every day. 90 Tab 1     No current Epic-ordered facility-administered medications on file.        Past Medical History:   Diagnosis Date   • Cancer (HCC) 2012    skin   • Dental disorder 09/11/2019    upper denture   • Diverticulitis    • Hypertension    • Morbid obesity ([facility])    • Obesity    • TOMI (obstructive sleep apnea)    • PE (pulmonary thromboembolism) ([facility])    • Urinary bladder disorder    • Urinary incontinence 09/11/2019   • Venous insufficiency of both lower extremities    • VSD (ventricular septal defect and aortic arch hypoplasia        Past Surgical History:   Procedure Laterality Date   • UMBILICAL HERNIA REPAIR  9/17/2019    Procedure: REPAIR, HERNIA, UMBILICAL- INCARCERATED;  Surgeon: Truong Daniels M.D.;  Location: SURGERY SAME DAY [facility];  Service: General   • KNEE ARTHROPLASTY TOTAL Right 2014   •  "HYSTERECTOMY, VAGINAL     • VEIN STRIPPING         Social History     Tobacco Use   • Smoking status: Never Smoker   • Smokeless tobacco: Never Used   Substance Use Topics   • Alcohol use: No   • Drug use: No       Social History     Social History Narrative   • Not on file       Family History   Problem Relation Age of Onset   • Lung Disease Mother         COPD    • Heart Attack Father    • No Known Problems Sister    • No Known Problems Brother    • No Known Problems Maternal Grandmother    • No Known Problems Maternal Grandfather    • No Known Problems Paternal Grandmother    • Heart Attack Paternal Grandfather    • Heart Disease Daughter    • No Known Problems Daughter    • Heart Disease Son        ROS:     - Constitutional: Negative for fever, chills, unexpected weight change, and fatigue/generalized weakness.     - Respiratory: Negative for cough, sputum production, chest congestion, dyspnea, wheezing, and crackles.      - Cardiovascular: Negative for chest pain, palpitations, orthopnea, PND, and bilateral lower extremity edema.       Exam: /68 (BP Location: Right arm, Patient Position: Sitting, BP Cuff Size: Large adult)   Pulse 76   Temp 36.9 °C (98.5 °F) (Temporal)   Ht 1.626 m (5' 4\")   Wt 119.7 kg (264 lb)   SpO2 96%  Body mass index is 45.32 kg/m².    General: Well appearing, NAD  Skin: Warm and dry.  Minimal induration noted in right axilla, at the site of the previous abscess.  No active drainage.  No surrounding erythema.  Musculoskeletal:  No extremity cyanosis, clubbing, or edema.  Psych: Normal mood and affect. Alert and oriented. Judgment and insight is normal.    Please note that this dictation was created using voice recognition software. I have made every reasonable attempt to correct obvious errors, but I expect that there are errors of grammar and possibly content that I did not discover before finalizing the note.      Assessment/Plan  Fior was seen today for " follow-up.    Diagnoses and all orders for this visit:    Abscess  New but resolving issue for the patient.  Status post I&D here in office 5 days ago.  The abscess looks essentially resolved with some mild induration left but no signs of surrounding cellulitis.  I think we can hold off on further antibiotic treatment at this time.  However, patient is going on a long road trip that she is going on in a few days.  She did not tolerate doxycycline, I do not you want to use Bactrim given risk for hyperkalemia in conjunction with lisinopril.  This leaves us with clindamycin.  I think she can hold off on using it for now, but I have given her a rescue prescription in the event that the abscess does not continue to improve and resolve completely or if it flares back up again.  -     clindamycin (CLEOCIN) 150 MG Cap; Take 2 Caps by mouth 4 times a day for 5 days.      Follow-up in 1 to 2 months for routine follow-up or sooner if needed.    Sara Mei, DO  [facility]

## 2020-10-06 ENCOUNTER — NON-PROVIDER VISIT (OUTPATIENT)
Dept: MEDICAL GROUP | Facility: PHYSICIAN GROUP | Age: 70
End: 2020-10-06
Payer: MEDICARE

## 2020-10-06 DIAGNOSIS — Z23 NEED FOR VACCINATION: ICD-10-CM

## 2020-10-06 PROCEDURE — G0008 ADMIN INFLUENZA VIRUS VAC: HCPCS | Performed by: INTERNAL MEDICINE

## 2020-10-06 PROCEDURE — 99999 PR NO CHARGE: CPT | Performed by: INTERNAL MEDICINE

## 2020-10-06 PROCEDURE — 90662 IIV NO PRSV INCREASED AG IM: CPT | Performed by: INTERNAL MEDICINE

## 2020-10-06 NOTE — PROGRESS NOTES
"Fior Ray is a 70 y.o. female here for a non-provider visit for:   FLU    Reason for immunization: Annual Flu Vaccine  Immunization records indicate need for vaccine: Yes, confirmed with Epic  Minimum interval has been met for this vaccine: Yes  ABN completed: Not Indicated    Order and dose verified by: Nuvia ARANDA Dated  10/06/2020 was given to patient: Yes  All IAC Questionnaire questions were answered \"No.\"    Patient tolerated injection and no adverse effects were observed or reported: Yes    Pt scheduled for next dose in series: Not Indicated    "

## 2020-10-21 ENCOUNTER — TELEPHONE (OUTPATIENT)
Dept: MEDICAL GROUP | Facility: PHYSICIAN GROUP | Age: 70
End: 2020-10-21

## 2020-10-21 NOTE — TELEPHONE ENCOUNTER
ESTABLISHED PATIENT PRE-VISIT PLANNING     Patient was NOT contacted to complete PVP.    1.  Reviewed notes from the last few office visits within the medical group: Yes    2.  If any orders were placed at last visit or intended to be done for this visit (i.e. 6 mos follow-up), do we have Results/Consult Notes?        •  Labs - Labs were not ordered at last office visit.       •  Imaging - Imaging was not ordered at last office visit.       •  Referrals - No referrals were ordered at last office visit.    3. Is this appointment scheduled as a Hospital Follow-Up? No    4.  Immunizations were updated in Epic using WebIZ?: UNABLE TO CHECK WEBIZ       •  Web Iz Recommendations:     5.  Patient is due for the following Health Maintenance Topics:   Health Maintenance Due   Topic Date Due   • IMM DTaP/Tdap/Td Vaccine (1 - Tdap) 03/23/1969   • COLONOSCOPY  03/23/2000   • IMM ZOSTER VACCINES (1 of 2) 03/23/2000   • BONE DENSITY  03/23/2015           6. Orders for overdue Health Maintenance topics pended in Pre-Charting? NO    7.  AHA (MDX) form printed for Provider? YES    8.  Patient was NOT informed to arrive 15 min prior to their scheduled appointment and bring in their medication bottles.

## 2020-10-28 ENCOUNTER — OFFICE VISIT (OUTPATIENT)
Dept: MEDICAL GROUP | Facility: PHYSICIAN GROUP | Age: 70
End: 2020-10-28
Payer: MEDICARE

## 2020-10-28 VITALS
SYSTOLIC BLOOD PRESSURE: 118 MMHG | DIASTOLIC BLOOD PRESSURE: 72 MMHG | HEART RATE: 88 BPM | WEIGHT: 265 LBS | BODY MASS INDEX: 44.15 KG/M2 | RESPIRATION RATE: 18 BRPM | OXYGEN SATURATION: 95 % | HEIGHT: 65 IN | TEMPERATURE: 98.5 F

## 2020-10-28 DIAGNOSIS — R73.03 PREDIABETES: ICD-10-CM

## 2020-10-28 DIAGNOSIS — Z87.898 HISTORY OF ABNORMAL MAMMOGRAM: ICD-10-CM

## 2020-10-28 DIAGNOSIS — N63.10 UNSPECIFIED LUMP IN THE RIGHT BREAST, UNSPECIFIED QUADRANT: ICD-10-CM

## 2020-10-28 DIAGNOSIS — H61.21 IMPACTED CERUMEN OF RIGHT EAR: ICD-10-CM

## 2020-10-28 DIAGNOSIS — E55.9 VITAMIN D DEFICIENCY: ICD-10-CM

## 2020-10-28 DIAGNOSIS — I10 ESSENTIAL HYPERTENSION: ICD-10-CM

## 2020-10-28 DIAGNOSIS — Z12.31 SCREENING MAMMOGRAM, ENCOUNTER FOR: ICD-10-CM

## 2020-10-28 DIAGNOSIS — Z23 NEED FOR VACCINATION: ICD-10-CM

## 2020-10-28 DIAGNOSIS — Z12.12 SCREENING FOR COLORECTAL CANCER: ICD-10-CM

## 2020-10-28 DIAGNOSIS — L60.0 INGROWN RIGHT GREATER TOENAIL: ICD-10-CM

## 2020-10-28 DIAGNOSIS — Z12.11 SCREENING FOR COLORECTAL CANCER: ICD-10-CM

## 2020-10-28 PROCEDURE — 90750 HZV VACC RECOMBINANT IM: CPT | Performed by: FAMILY MEDICINE

## 2020-10-28 PROCEDURE — 99214 OFFICE O/P EST MOD 30 MIN: CPT | Mod: 25 | Performed by: FAMILY MEDICINE

## 2020-10-28 PROCEDURE — 90471 IMMUNIZATION ADMIN: CPT | Performed by: FAMILY MEDICINE

## 2020-10-28 ASSESSMENT — FIBROSIS 4 INDEX: FIB4 SCORE: 1

## 2020-10-28 NOTE — PROGRESS NOTES
CC: Ingrown toenail    HISTORY OF THE PRESENT ILLNESS: Patient is a 70 y.o. female. This pleasant patient is here today to discuss following issues and for routine follow-up.    Patient is here today for routine follow-up.  Her only specific concern today is a painful ingrown toenail on her right great toe.  It has been there for a long time.  She initially refuses referral to podiatrist, but is exquisitely tender on exam and eventually agrees to it.  She denies any drainage or significant redness.    She also notes that she is hearing some abnormal noise in her right ear.  Started after a car ride when the window was down.  Wondering if I can check her ear.    Blood pressure remains very well controlled on current medications lisinopril and hydrochlorothiazide.  Denies side effects.  Due for lab work.    She is also due for repeat mammography.  She had an abnormal mammogram 6 months ago and will be due in December.    Allergies: Doxycycline, Mustard [allyl isothiocyanate], and Prochlorperazine edisylate [compazine]    Current Outpatient Medications Ordered in Epic   Medication Sig Dispense Refill   • lisinopril (PRINIVIL) 30 MG tablet Take 1 Tab by mouth every day. 100 Tab 3   • hydroCHLOROthiazide (HYDRODIURIL) 25 MG Tab Take 1 Tab by mouth every day. 90 Tab 1     No current Epic-ordered facility-administered medications on file.        Past Medical History:   Diagnosis Date   • Cancer (HCC) 2012    skin   • Dental disorder 09/11/2019    upper denture   • Diverticulitis    • Hypertension    • Morbid obesity ([facility])    • Obesity    • TOMI (obstructive sleep apnea)    • PE (pulmonary thromboembolism) ([facility])    • Urinary bladder disorder    • Urinary incontinence 09/11/2019   • Venous insufficiency of both lower extremities    • VSD (ventricular septal defect and aortic arch hypoplasia        Past Surgical History:   Procedure Laterality Date   • UMBILICAL HERNIA REPAIR  9/17/2019    Procedure: REPAIR, HERNIA, UMBILICAL-  "INCARCERATED;  Surgeon: Truong Daniels M.D.;  Location: SURGERY SAME DAY [facility];  Service: General   • KNEE ARTHROPLASTY TOTAL Right 2014   • HYSTERECTOMY, VAGINAL     • VEIN STRIPPING         Social History     Tobacco Use   • Smoking status: Never Smoker   • Smokeless tobacco: Never Used   Substance Use Topics   • Alcohol use: No   • Drug use: No       Social History     Social History Narrative   • Not on file       Family History   Problem Relation Age of Onset   • Lung Disease Mother         COPD    • Heart Attack Father    • No Known Problems Sister    • No Known Problems Brother    • No Known Problems Maternal Grandmother    • No Known Problems Maternal Grandfather    • No Known Problems Paternal Grandmother    • Heart Attack Paternal Grandfather    • Heart Disease Daughter    • No Known Problems Daughter    • Heart Disease Son        ROS:     - Constitutional: Negative for fever, chills, unexpected weight change, and fatigue/generalized weakness.     - Respiratory: Negative for cough, sputum production, chest congestion, dyspnea, wheezing, and crackles.      - Cardiovascular: Positive for bilateral severe lower extremity edema due to chronic lymphedema, unchanged.  Negative for chest pain, palpitations, orthopnea, PND.    Exam: /72 (BP Location: Right arm, Patient Position: Sitting, BP Cuff Size: Large adult)   Pulse 88   Temp 36.9 °C (98.5 °F) (Temporal)   Resp 18   Ht 1.651 m (5' 5\")   Wt 120.2 kg (265 lb)   SpO2 95%  Body mass index is 44.1 kg/m².    General: Well appearing, NAD  HEENT: Normocephalic. Conjunctiva clear, lids without ptosis, pupils equal and reactive to light accommodation, ears normal shape and contour, left ear canal clear with normal TM, right ear canal obstructed by dense cerumen impaction  Pulmonary: Clear to ausculation.  Normal effort. No rales, ronchi, or wheezing.  Cardiovascular: Regular rate and rhythm without murmur, rubs or gallop.   Skin: Warm and dry.  " No obvious lesions.  Tender, ingrown toenail (both medial and laterally) noted on right great toe  Musculoskeletal:  No extremity cyanosis, clubbing, or edema.  Psych: Normal mood and affect. Alert and oriented. Judgment and insight is normal.    Please note that this dictation was created using voice recognition software. I have made every reasonable attempt to correct obvious errors, but I expect that there are errors of grammar and possibly content that I did not discover before finalizing the note.      Assessment/Plan  Fior was seen today for follow-up.    Diagnoses and all orders for this visit:    History of abnormal mammogram  Screening mammogram, encounter for  Unspecified lump in the right breast, unspecified quadrant  She will be due for repeat mammography in December, so ordered as below.  -     MA-DIAGNOSTIC MAMMO BILAT W/TOMOSYNTHESIS W/CAD; Future    Ingrown right greater toenail  New issue to me.  Toenail is exquisitely tender and ingrown.  Recommend referral to podiatry and patient is amenable to this.  -     REFERRAL TO PODIATRY    Screening for colorectal cancer  -     OCCULT BLOOD FECES IMMUNOASSAY (FIT); Future    Need for vaccination  -     Shingles Vaccine (SHINGRIX)    Impacted cerumen of right ear  We did attempt to disimpact her ear in clinic today, but were unsuccessful.  Patient is going to trial Debrox drops for 1 weeks and then return for irrigation.    Vitamin D deficiency  Chronic issue, due for repeat labs.  -     VITAMIN D,25 HYDROXY; Future    Essential hypertension  Chronic issue, well controlled on current medications.  -     Comp Metabolic Panel; Future    Prediabetes  Chronic issue, due for lab check.  -     HEMOGLOBIN A1C; Future    Follow-up in about 4 to 6 months or sooner if needed.    Sara Mei,   [facility]

## 2020-11-04 ENCOUNTER — OFFICE VISIT (OUTPATIENT)
Dept: MEDICAL GROUP | Facility: PHYSICIAN GROUP | Age: 70
End: 2020-11-04
Payer: MEDICARE

## 2020-11-04 VITALS
HEIGHT: 65 IN | HEART RATE: 87 BPM | SYSTOLIC BLOOD PRESSURE: 118 MMHG | TEMPERATURE: 98.5 F | BODY MASS INDEX: 43.32 KG/M2 | WEIGHT: 260 LBS | OXYGEN SATURATION: 97 % | DIASTOLIC BLOOD PRESSURE: 58 MMHG

## 2020-11-04 DIAGNOSIS — H61.21 HEARING LOSS OF RIGHT EAR DUE TO CERUMEN IMPACTION: ICD-10-CM

## 2020-11-04 PROCEDURE — 99212 OFFICE O/P EST SF 10 MIN: CPT | Mod: 25 | Performed by: FAMILY MEDICINE

## 2020-11-04 PROCEDURE — 69210 REMOVE IMPACTED EAR WAX UNI: CPT | Performed by: FAMILY MEDICINE

## 2020-11-04 ASSESSMENT — FIBROSIS 4 INDEX: FIB4 SCORE: 1

## 2020-11-04 NOTE — PROGRESS NOTES
CC: Cerumen impaction    HISTORY OF THE PRESENT ILLNESS: Patient is a 70 y.o. female. This pleasant patient is here today for follow-up.    Patient is here today for follow-up on her cerumen impaction.  She has been using Debrox drops for 1 week now and she is here for irrigation.  She continues to have hearing loss in her right ear associated with cerumen impaction.  Denies any pain, dizziness or other symptoms.    Allergies: Doxycycline, Mustard [allyl isothiocyanate], and Prochlorperazine edisylate [compazine]    Current Outpatient Medications Ordered in Epic   Medication Sig Dispense Refill   • lisinopril (PRINIVIL) 30 MG tablet Take 1 Tab by mouth every day. 100 Tab 3   • hydroCHLOROthiazide (HYDRODIURIL) 25 MG Tab Take 1 Tab by mouth every day. 90 Tab 1     No current Epic-ordered facility-administered medications on file.        Past Medical History:   Diagnosis Date   • Cancer ([facility]) 2012    skin   • Dental disorder 09/11/2019    upper denture   • Diverticulitis    • Hypertension    • Morbid obesity ([facility])    • Obesity    • TOMI (obstructive sleep apnea)    • PE (pulmonary thromboembolism) ([facility])    • Urinary bladder disorder    • Urinary incontinence 09/11/2019   • Venous insufficiency of both lower extremities    • VSD (ventricular septal defect and aortic arch hypoplasia        Past Surgical History:   Procedure Laterality Date   • UMBILICAL HERNIA REPAIR  9/17/2019    Procedure: REPAIR, HERNIA, UMBILICAL- INCARCERATED;  Surgeon: Truong Daniels M.D.;  Location: SURGERY SAME DAY [facility];  Service: General   • KNEE ARTHROPLASTY TOTAL Right 2014   • HYSTERECTOMY, VAGINAL     • VEIN STRIPPING         Social History     Tobacco Use   • Smoking status: Never Smoker   • Smokeless tobacco: Never Used   Substance Use Topics   • Alcohol use: No   • Drug use: No       Social History     Social History Narrative   • Not on file       Family History   Problem Relation Age of Onset   • Lung Disease Mother          "COPD    • Heart Attack Father    • No Known Problems Sister    • No Known Problems Brother    • No Known Problems Maternal Grandmother    • No Known Problems Maternal Grandfather    • No Known Problems Paternal Grandmother    • Heart Attack Paternal Grandfather    • Heart Disease Daughter    • No Known Problems Daughter    • Heart Disease Son        ROS:     - Constitutional: Negative for fever, chills, unexpected weight change, and fatigue/generalized weakness.     - Respiratory: Negative for cough, sputum production, chest congestion, dyspnea, wheezing, and crackles.      - Cardiovascular: Negative for chest pain, palpitations, orthopnea, PND, and bilateral lower extremity edema.     Exam: /58 (BP Location: Left arm, Patient Position: Sitting, BP Cuff Size: Large adult)   Pulse 87   Temp 36.9 °C (98.5 °F) (Temporal)   Ht 1.651 m (5' 5\")   Wt 117.9 kg (260 lb)   SpO2 97%  Body mass index is 43.27 kg/m².    General: Well appearing, NAD  HEENT: Normocephalic. Conjunctiva clear, lids without ptosis, pupils equal and reactive to light accommodation, ears normal shape and contour, left ear canal clear with normal TM.  Right ear canal initially obstructed by cerumen impaction but cleared with irrigation and curette revealing normal TM.  Skin: Warm and dry.  No obvious lesions.  Musculoskeletal:  No extremity cyanosis, clubbing, or edema.  Psych: Normal mood and affect. Alert and oriented. Judgment and insight is normal.    Please note that this dictation was created using voice recognition software. I have made every reasonable attempt to correct obvious errors, but I expect that there are errors of grammar and possibly content that I did not discover before finalizing the note.      Assessment/Plan  Fior was seen today for cerumen impaction.    Diagnoses and all orders for this visit:    Hearing loss of right ear due to cerumen impaction  Cerumen disimpaction achieved today using commendation of " irrigation and ear curette.  She had some minimal residual wax along the edges of the ear canal.  She can continue to use the Debrox to further loosen any remaining wax.    Follow-up if symptoms not improving.    Sara Mei DO  [facility], [place]

## 2020-12-09 ENCOUNTER — OFFICE VISIT (OUTPATIENT)
Dept: MEDICAL GROUP | Facility: PHYSICIAN GROUP | Age: 70
End: 2020-12-09
Payer: MEDICARE

## 2020-12-09 VITALS
HEART RATE: 72 BPM | HEIGHT: 65 IN | WEIGHT: 264 LBS | DIASTOLIC BLOOD PRESSURE: 72 MMHG | TEMPERATURE: 98.3 F | BODY MASS INDEX: 43.99 KG/M2 | OXYGEN SATURATION: 95 % | SYSTOLIC BLOOD PRESSURE: 134 MMHG

## 2020-12-09 DIAGNOSIS — L03.115 CELLULITIS OF RIGHT LOWER EXTREMITY: ICD-10-CM

## 2020-12-09 PROCEDURE — 99214 OFFICE O/P EST MOD 30 MIN: CPT | Performed by: FAMILY MEDICINE

## 2020-12-09 RX ORDER — AMOXICILLIN 500 MG/1
500 CAPSULE ORAL 3 TIMES DAILY
Qty: 15 CAP | Refills: 0 | Status: SHIPPED | OUTPATIENT
Start: 2020-12-09 | End: 2020-12-14

## 2020-12-09 ASSESSMENT — FIBROSIS 4 INDEX: FIB4 SCORE: 1

## 2020-12-10 NOTE — PROGRESS NOTES
CC: Skin infection    HISTORY OF THE PRESENT ILLNESS: Patient is a 70 y.o. female. This pleasant patient is here today to discuss following issues.    Patient is here today with concern for lower extremity cellulitis. This has been a recurrent issue for her. She notes that about 2 days ago she began to experience some pain, burning, redness and warmth on the medial aspect of her right calf. She does have severe lymphedema which puts her at risk for recurrent cellulitis. She denies any fevers or chills. She feels this is fairly early in her course of cellulitis but she wants to make sure it gets better prior to the holidays.    She has seen various specialist for her lymphedema in the past but has not had any resolution or specific help for this issue.    In the past she has responded very well to amoxicillin for her cellulitis, as we have tried other antibiotics in the past which have not helped.    Allergies: Doxycycline, Mustard [allyl isothiocyanate], and Prochlorperazine edisylate [compazine]    Current Outpatient Medications Ordered in Epic   Medication Sig Dispense Refill   • amoxicillin (AMOXIL) 500 MG Cap Take 1 Cap by mouth 3 times a day for 5 days. 15 Cap 0   • lisinopril (PRINIVIL) 30 MG tablet Take 1 Tab by mouth every day. 100 Tab 3   • hydroCHLOROthiazide (HYDRODIURIL) 25 MG Tab Take 1 Tab by mouth every day. 90 Tab 1     No current Epic-ordered facility-administered medications on file.        Past Medical History:   Diagnosis Date   • Cancer ([facility]) 2012    skin   • Dental disorder 09/11/2019    upper denture   • Diverticulitis    • Hypertension    • Morbid obesity ([facility])    • Obesity    • TOMI (obstructive sleep apnea)    • PE (pulmonary thromboembolism) ([facility])    • Urinary bladder disorder    • Urinary incontinence 09/11/2019   • Venous insufficiency of both lower extremities    • VSD (ventricular septal defect and aortic arch hypoplasia        Past Surgical History:   Procedure Laterality Date   •  "UMBILICAL HERNIA REPAIR  9/17/2019    Procedure: REPAIR, HERNIA, UMBILICAL- INCARCERATED;  Surgeon: Truong Daniels M.D.;  Location: SURGERY SAME DAY [facility];  Service: General   • KNEE ARTHROPLASTY TOTAL Right 2014   • HYSTERECTOMY, VAGINAL     • VEIN STRIPPING         Social History     Tobacco Use   • Smoking status: Never Smoker   • Smokeless tobacco: Never Used   Substance Use Topics   • Alcohol use: No   • Drug use: No       Social History     Social History Narrative   • Not on file       Family History   Problem Relation Age of Onset   • Lung Disease Mother         COPD    • Heart Attack Father    • No Known Problems Sister    • No Known Problems Brother    • No Known Problems Maternal Grandmother    • No Known Problems Maternal Grandfather    • No Known Problems Paternal Grandmother    • Heart Attack Paternal Grandfather    • Heart Disease Daughter    • No Known Problems Daughter    • Heart Disease Son        ROS:     - Constitutional: Negative for fever, chills, unexpected weight change, and fatigue/generalized weakness.     - HEENT: Negative for headaches, vision changes, hearing changes, ear pain, ear discharge, rhinorrhea, sinus congestion, sore throat, and neck pain.      - Respiratory: Negative for cough, sputum production, chest congestion, dyspnea, wheezing, and crackles.      Exam: /72 (BP Location: Left arm, Patient Position: Sitting, BP Cuff Size: Large adult)   Pulse 72   Temp 36.8 °C (98.3 °F) (Temporal)   Ht 1.651 m (5' 5\")   Wt 119.7 kg (264 lb)   SpO2 95%  Body mass index is 43.93 kg/m².    General: Well appearing, NAD  Extremity: Right lower extremity with severe lymphedema. Medial calf area with some mild erythema, warmth and tenderness to palpation.  Skin: Warm and dry.  No obvious lesions.  Musculoskeletal:  No extremity cyanosis, clubbing, or edema.  Psych: Normal mood and affect. Alert and oriented. Judgment and insight is normal.    Please note that this dictation " was created using voice recognition software. I have made every reasonable attempt to correct obvious errors, but I expect that there are errors of grammar and possibly content that I did not discover before finalizing the note.      Assessment/Plan  Fior was seen today for edema.    Diagnoses and all orders for this visit:    Cellulitis of right lower extremity  New but recurrent issue for the patient. She does appear to have some mild cellulitis on her right medial calf area. She has responded well to amoxicillin in the past, so I will go ahead and do 5 days of amoxicillin at this time. Patient knows that pain, redness and warmth should completely resolve with use of antibiotics, so if they do not, she knows to contact me. Other Red flags reviewed such as fevers, chills or rapid progression of redness and swelling.  -     amoxicillin (AMOXIL) 500 MG Cap; Take 1 Cap by mouth 3 times a day for 5 days.      Follow-up if symptoms not improving.    Sara Mei, DO  [facility]

## 2020-12-16 ENCOUNTER — TELEPHONE (OUTPATIENT)
Dept: MEDICAL GROUP | Facility: PHYSICIAN GROUP | Age: 70
End: 2020-12-16

## 2020-12-16 RX ORDER — AMOXICILLIN 500 MG/1
500 CAPSULE ORAL 3 TIMES DAILY
Qty: 15 CAP | Refills: 0 | Status: SHIPPED | OUTPATIENT
Start: 2020-12-16 | End: 2020-12-21

## 2020-12-16 NOTE — TELEPHONE ENCOUNTER
Fior left a message stating that her cellulitis is not quite resolved yet, she says that you told her to just call and let you know.

## 2021-01-15 DIAGNOSIS — Z23 NEED FOR VACCINATION: ICD-10-CM

## 2021-01-25 ENCOUNTER — HOSPITAL ENCOUNTER (OUTPATIENT)
Dept: RADIOLOGY | Facility: MEDICAL CENTER | Age: 71
End: 2021-01-25
Attending: FAMILY MEDICINE
Payer: MEDICARE

## 2021-01-25 DIAGNOSIS — R92.8 ABNORMAL FINDING ON BREAST IMAGING: ICD-10-CM

## 2021-01-25 PROCEDURE — G0279 TOMOSYNTHESIS, MAMMO: HCPCS | Mod: RT

## 2021-03-08 ENCOUNTER — OFFICE VISIT (OUTPATIENT)
Dept: MEDICAL GROUP | Facility: PHYSICIAN GROUP | Age: 71
End: 2021-03-08
Payer: MEDICARE

## 2021-03-08 VITALS
TEMPERATURE: 98.2 F | SYSTOLIC BLOOD PRESSURE: 124 MMHG | WEIGHT: 270 LBS | BODY MASS INDEX: 44.98 KG/M2 | OXYGEN SATURATION: 94 % | HEIGHT: 65 IN | DIASTOLIC BLOOD PRESSURE: 68 MMHG | HEART RATE: 85 BPM

## 2021-03-08 DIAGNOSIS — Z00.00 MEDICARE ANNUAL WELLNESS VISIT, SUBSEQUENT: Primary | ICD-10-CM

## 2021-03-08 DIAGNOSIS — I87.2 VENOUS INSUFFICIENCY OF BOTH LOWER EXTREMITIES: Chronic | ICD-10-CM

## 2021-03-08 DIAGNOSIS — Z85.828 HISTORY OF BASAL CELL CANCER: ICD-10-CM

## 2021-03-08 DIAGNOSIS — K43.9 VENTRAL HERNIA WITHOUT OBSTRUCTION OR GANGRENE: ICD-10-CM

## 2021-03-08 DIAGNOSIS — Z86.711 HISTORY OF PULMONARY EMBOLISM: ICD-10-CM

## 2021-03-08 DIAGNOSIS — R60.0 LOWER EXTREMITY EDEMA: ICD-10-CM

## 2021-03-08 DIAGNOSIS — G47.33 OSA (OBSTRUCTIVE SLEEP APNEA): ICD-10-CM

## 2021-03-08 DIAGNOSIS — I10 ESSENTIAL HYPERTENSION: ICD-10-CM

## 2021-03-08 DIAGNOSIS — Q21.0 VENTRICULAR SEPTAL DEFECT: ICD-10-CM

## 2021-03-08 DIAGNOSIS — Z23 NEED FOR VACCINATION: ICD-10-CM

## 2021-03-08 DIAGNOSIS — Z86.718 HISTORY OF RECURRENT DEEP VEIN THROMBOSIS (DVT): ICD-10-CM

## 2021-03-08 DIAGNOSIS — Z12.31 ENCOUNTER FOR SCREENING MAMMOGRAM FOR BREAST CANCER: ICD-10-CM

## 2021-03-08 DIAGNOSIS — D68.9 COAGULOPATHY (HCC): ICD-10-CM

## 2021-03-08 DIAGNOSIS — Z78.0 POSTMENOPAUSAL: ICD-10-CM

## 2021-03-08 DIAGNOSIS — E78.00 PURE HYPERCHOLESTEROLEMIA: ICD-10-CM

## 2021-03-08 DIAGNOSIS — Z87.19 HISTORY OF DIVERTICULITIS: ICD-10-CM

## 2021-03-08 DIAGNOSIS — Z12.11 SCREENING FOR COLORECTAL CANCER: ICD-10-CM

## 2021-03-08 DIAGNOSIS — R73.03 PREDIABETES: ICD-10-CM

## 2021-03-08 DIAGNOSIS — J96.11 CHRONIC RESPIRATORY FAILURE WITH HYPOXIA (HCC): ICD-10-CM

## 2021-03-08 DIAGNOSIS — Z95.828 PRESENCE OF INFERIOR VENA CAVA FILTER: ICD-10-CM

## 2021-03-08 DIAGNOSIS — Z12.12 SCREENING FOR COLORECTAL CANCER: ICD-10-CM

## 2021-03-08 DIAGNOSIS — E66.01 MORBID OBESITY (HCC): Chronic | ICD-10-CM

## 2021-03-08 PROBLEM — E78.49 OTHER HYPERLIPIDEMIA: Status: RESOLVED | Noted: 2019-05-02 | Resolved: 2021-03-08

## 2021-03-08 PROBLEM — Z87.898 HISTORY OF BREAST LUMP: Status: RESOLVED | Noted: 2019-01-21 | Resolved: 2021-03-08

## 2021-03-08 PROCEDURE — 90471 IMMUNIZATION ADMIN: CPT | Performed by: FAMILY MEDICINE

## 2021-03-08 PROCEDURE — 90750 HZV VACC RECOMBINANT IM: CPT | Performed by: FAMILY MEDICINE

## 2021-03-08 PROCEDURE — G0439 PPPS, SUBSEQ VISIT: HCPCS | Mod: 25 | Performed by: FAMILY MEDICINE

## 2021-03-08 RX ORDER — LISINOPRIL 30 MG/1
30 TABLET ORAL DAILY
Qty: 100 TABLET | Refills: 3 | Status: SHIPPED | OUTPATIENT
Start: 2021-03-08 | End: 2022-03-28 | Stop reason: SDUPTHER

## 2021-03-08 ASSESSMENT — ENCOUNTER SYMPTOMS: GENERAL WELL-BEING: GOOD

## 2021-03-08 ASSESSMENT — PATIENT HEALTH QUESTIONNAIRE - PHQ9: CLINICAL INTERPRETATION OF PHQ2 SCORE: 0

## 2021-03-08 ASSESSMENT — ACTIVITIES OF DAILY LIVING (ADL): BATHING_REQUIRES_ASSISTANCE: 0

## 2021-03-08 ASSESSMENT — FIBROSIS 4 INDEX: FIB4 SCORE: 1

## 2021-03-08 NOTE — PROGRESS NOTES
Chief Complaint   Patient presents with   • Annual Wellness Visit         HPI:  Fior is a 70 y.o. here for Medicare Annual Wellness Visit         Patient Active Problem List    Diagnosis Date Noted   • Coagulopathy (HCC) 10/12/2018   • TOMI (obstructive sleep apnea) 10/14/2018   • History of pulmonary embolism 10/12/2018   • Essential hypertension 10/12/2018   • VSD (ventricular septal defect) 10/12/2018   • Morbid obesity (HCC)    • Ventral hernia without obstruction or gangrene 09/17/2019   • Pure hypercholesterolemia 05/10/2019   • Venous insufficiency of both lower extremities    • Prediabetes 01/23/2019   • History of diverticulitis 01/21/2019   • History of recurrent deep vein thrombosis (DVT) 01/21/2019   • Presence of inferior vena cava filter 01/21/2019   • Lower extremity edema 01/21/2019   • Chronic respiratory failure with hypoxia (HCC) 01/21/2019   • History of basal cell cancer 01/21/2019       Current Outpatient Medications   Medication Sig Dispense Refill   • lisinopril (PRINIVIL) 30 MG tablet Take 1 tablet by mouth every day. 100 tablet 3   • hydroCHLOROthiazide (HYDRODIURIL) 25 MG Tab Take 1 Tab by mouth every day. 90 Tab 1     No current facility-administered medications for this visit.        Patient is taking medications as noted in medication list.  Current supplements as per medication list.     Allergies: Doxycycline, Mustard [allyl isothiocyanate], and Prochlorperazine edisylate [compazine]    Current social contact/activities:     Is patient current with immunizations? No, due for SHINGRIX (Shingles). Patient is interested in receiving SHINGRIX (Shingles) today.    She  reports that she has never smoked. She has never used smokeless tobacco. She reports that she does not drink alcohol and does not use drugs.  Counseling given: Not Answered        DPA/Advanced directive: Patient has Advanced Directive on file.     ROS:    Gait: Uses no assistive device   Ostomy: No   Other tubes: No    Amputations: No   Chronic oxygen use No   Last eye exam 3/20  Wears hearing aids: No   : Reports urinary leakage during the last 6 months that has somewhat interfered with their daily activities or sleep.      Screening:        Depression Screening    Little interest or pleasure in doing things?  0 - not at all  Feeling down, depressed, or hopeless? 0 - not at all  Patient Health Questionnaire Score: 0    If depressive symptoms identified deferred to follow up visit unless specifically addressed in assessment and plan.    Interpretation of PHQ-9 Total Score   Score Severity   1-4 No Depression   5-9 Mild Depression   10-14 Moderate Depression   15-19 Moderately Severe Depression   20-27 Severe Depression    Screening for Cognitive Impairment    Three Minute Recall (river, nation, finger)  3/3    [place] clock face with all 12 numbers and set the hands to show 10 past 11.  Yes    If cognitive concerns identified, deferred for follow up unless specifically addressed in assessment and plan.    Fall Risk Assessment    Has the patient had two or more falls in the last year or any fall with injury in the last year?  No  If fall risk identified, deferred for follow up unless specifically addressed in assessment and plan.    Safety Assessment    Throw rugs on floor.  No  Handrails on all stairs.  Yes  Good lighting in all hallways.  Yes  Difficulty hearing.  No  Patient counseled about all safety risks that were identified.    Functional Assessment ADLs    Are there any barriers preventing you from cooking for yourself or meeting nutritional needs?  No.    Are there any barriers preventing you from driving safely or obtaining transportation?  No.    Are there any barriers preventing you from using a telephone or calling for help?  No.    Are there any barriers preventing you from shopping?  No.    Are there any barriers preventing you from taking care of your own finances?  No.    Are there any barriers preventing you from  managing your medications?  No.    Are there any barriers preventing you from showering, bathing or dressing yourself?  No.    Are you currently engaging in any exercise or physical activity?  No.     What is your perception of your health?  Good.    Health Maintenance Summary                IMM DTaP/Tdap/Td Vaccine Overdue 3/23/1969     COLON CANCER SCREENING ANNUAL FIT Overdue 3/23/2000     BONE DENSITY Overdue 3/23/2015      Done 9/22/2005 DS-BONE DENSITY STUDY (DEXA)    MAMMOGRAM Overdue 12/15/2020      Done 6/15/2020 MA-DIAGNOSTIC MAMMO BILAT W/TOMOSYNTHESIS W/O CAD     Patient has more history with this topic...    Annual Wellness Visit Next Due 3/9/2022      Done 3/8/2021 Visit Dx: Medicare annual wellness visit, subsequent     Patient has more history with this topic...          Patient Care Team:  Sara Mei D.O. as PCP - General (Family Medicine)  Vein [place]  as Consulting Physician (Vascular Surgery)  Mame Velasco C.N.A. (Inactive) as [facility]     Social History     Tobacco Use   • Smoking status: Never Smoker   • Smokeless tobacco: Never Used   Substance Use Topics   • Alcohol use: No   • Drug use: No     Family History   Problem Relation Age of Onset   • Lung Disease Mother         COPD    • Heart Attack Father    • No Known Problems Sister    • No Known Problems Brother    • No Known Problems Maternal Grandmother    • No Known Problems Maternal Grandfather    • No Known Problems Paternal Grandmother    • Heart Attack Paternal Grandfather    • Heart Disease Daughter    • No Known Problems Daughter    • Heart Disease Son      She  has a past medical history of Cancer ([facility]) (2012), Dental disorder (09/11/2019), Diverticulitis, Hypertension, Morbid obesity ([facility]), Obesity, TOMI (obstructive sleep apnea), PE (pulmonary thromboembolism) ([facility]), Urinary bladder disorder, Urinary incontinence (09/11/2019), Venous insufficiency of both lower extremities, and VSD  "(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 She also has no past medical history of Diabetes (HCC) or Renal disorder.   Past Surgical History:   Procedure Laterality Date   • UMBILICAL HERNIA REPAIR  9/17/2019    Procedure: REPAIR, HERNIA, UMBILICAL- INCARCERATED;  Surgeon: Truong Daniels M.D.;  Location: SURGERY SAME DAY [facility];  Service: General   • KNEE ARTHROPLASTY TOTAL Right 2014   • HYSTERECTOMY, VAGINAL     • VEIN STRIPPING             Exam:     /68 (BP Location: Right arm, Patient Position: Sitting, BP Cuff Size: Large adult)   Pulse 85   Temp 36.8 °C (98.2 °F) (Temporal)   Ht 1.651 m (5' 5\")   Wt 122 kg (270 lb)   SpO2 94%  Body mass index is 44.93 kg/m².    Hearing excellent.    Dentition fair  Alert, oriented in no acute distress.  Eye contact is good, speech goal directed, affect calm      Assessment and Plan. The following treatment and monitoring plan is recommended:      Fior was seen today for annual wellness visit.    Diagnoses and all orders for this visit:    Medicare annual wellness visit, subsequent    Venous insufficiency of both lower extremities  Lower extremity edema  Chronic issue, unable to afford physical therapy for lymphedema.  Compression stockings do not fit.  Recommend continued conservative measures such as elevation of legs and regular walking.    Morbid obesity (HCC)  Ongoing issue, lifestyle changes discussed.    Coagulopathy (HCC)  History of pulmonary embolism  History of recurrent deep vein thrombosis (DVT)  Presence of inferior vena cava filter  Previous issues, has IVC filter in place so no longer needs anticoagulation.    Essential hypertension  Chronic issue, well-controlled on current dose of lisinopril.  -     lisinopril (PRINIVIL) 30 MG tablet; Take 1 tablet by mouth every day.    TOMI (obstructive sleep apnea)  Chronic respiratory failure with hypoxia (HCC)  Chronic issues, has declined CPAP and oxygen in the past.    History of " diverticulitis  Previous issue, no current concerns.    History of basal cell cancer  No current concerns, follows with dermatology.    Prediabetes  Hemoglobin A1c previously ordered but not yet completed.  Recommend completion of lab work.    Pure hypercholesterolemia  Chronic issue, due for lipid profile.  Not currently on statin.  -     Lipid Profile; Future    Ventral hernia without obstruction or gangrene  Resolved.  Status post repair.    VSD (ventricular septal defect)  Since childhood.  She has a VSD noted on echo in August 2019.  Previously following with cardiology.  Will likely recommend repeat echo for evaluation at next appointment.    Need for vaccination  -     Shingles Vaccine (SHINGRIX)    Screening for colorectal cancer  -     OCCULT BLOOD FECES IMMUNOASSAY (FIT); Future    Encounter for screening mammogram for breast cancer  -     MA-SCREENING MAMMO BILAT W/TOMOSYNTHESIS W/CAD; Future    Postmenopausal  -     DS-BONE DENSITY STUDY (DEXA); Future      Services suggested: No services needed at this time  Health Care Screening recommendations as per orders if indicated.  Referrals offered: PT/OT/Nutrition counseling/Behavioral Health/Smoking cessation as per orders if indicated.    Discussion today about general wellness and lifestyle habits:    · Prevent falls and reduce trip hazards; Cautioned about securing or removing rugs.  · Have a working fire alarm and carbon monoxide detector;   · Engage in regular physical activity and social activities       Follow-up: Return in about 6 months (around 9/8/2021), or if symptoms worsen or fail to improve.

## 2021-03-08 NOTE — PROGRESS NOTES
Chief Complaint   Patient presents with   • Annual Wellness Visit   • Medication Refill     lisinopril         HPI:  Fior is a 70 y.o. here for Medicare Annual Wellness Visit    ***    Patient Active Problem List    Diagnosis Date Noted   • Coagulopathy (HCC) 10/12/2018   • TOMI (obstructive sleep apnea) 10/14/2018   • History of pulmonary embolism 10/12/2018   • Essential hypertension 10/12/2018   • VSD (ventricular septal defect) 10/12/2018   • Morbid obesity ([facility])    • Ventral hernia without obstruction or gangrene 09/17/2019   • Pure hypercholesterolemia 05/10/2019   • Other hyperlipidemia 05/02/2019   • Venous insufficiency of both lower extremities    • Prediabetes 01/23/2019   • History of diverticulitis 01/21/2019   • History of recurrent deep vein thrombosis (DVT) 01/21/2019   • Presence of inferior vena cava filter 01/21/2019   • Lower extremity edema 01/21/2019   • Chronic respiratory failure with hypoxia ([facility]) 01/21/2019   • History of breast lump 01/21/2019   • History of basal cell cancer 01/21/2019       Current Outpatient Medications   Medication Sig Dispense Refill   • lisinopril (PRINIVIL) 30 MG tablet Take 1 Tab by mouth every day. 100 Tab 3   • hydroCHLOROthiazide (HYDRODIURIL) 25 MG Tab Take 1 Tab by mouth every day. 90 Tab 1     No current facility-administered medications for this visit.        {MEDICATION ADHERENCE:00838}  Current supplements as per medication list.     Allergies: Doxycycline, Mustard [allyl isothiocyanate], and Prochlorperazine edisylate [compazine]    Current social contact/activities:     Is patient current with immunizations?     She  reports that she has never smoked. She has never used smokeless tobacco. She reports that she does not drink alcohol and does not use drugs.  Counseling given: Not Answered        DPA/Advanced directive: {MA ADVANCED DIRECTIVE:96510}    ROS:    Gait: Uses {DEVICE:53936} ***  Ostomy: {YES / NO:25731} ***  Other tubes: {YES / NO:29188}  ***  Amputations: {YES / NO:89052} ***  Chronic oxygen use {YES / NO:28673} ***  Last eye exam *** ***  Wears hearing aids: {YES / NO:75553} ***  : {Urinary leakage?:10469}  ***    Screening:    ***    Depression Screening    Little interest or pleasure in doing things?  0 - not at all  Feeling down, depressed, or hopeless? 0 - not at all  Patient Health Questionnaire Score: 0    If depressive symptoms identified deferred to follow up visit unless specifically addressed in assessment and plan.    Interpretation of PHQ-9 Total Score   Score Severity   1-4 No Depression   5-9 Mild Depression   10-14 Moderate Depression   15-19 Moderately Severe Depression   20-27 Severe Depression    Screening for Cognitive Impairment    Three Minute Recall (river, nation, finger)  3/3    [place] clock face with all 12 numbers and set the hands to show 10 past 11.  Yes    If cognitive concerns identified, deferred for follow up unless specifically addressed in assessment and plan.    Fall Risk Assessment    Has the patient had two or more falls in the last year or any fall with injury in the last year?  No  If fall risk identified, deferred for follow up unless specifically addressed in assessment and plan.    Safety Assessment    Throw rugs on floor.  No  Handrails on all stairs.  Yes  Good lighting in all hallways.  Yes  Difficulty hearing.  No  Patient counseled about all safety risks that were identified.    Functional Assessment ADLs    Are there any barriers preventing you from cooking for yourself or meeting nutritional needs?  No.    Are there any barriers preventing you from driving safely or obtaining transportation?  No.    Are there any barriers preventing you from using a telephone or calling for help?  No.    Are there any barriers preventing you from shopping?  No.    Are there any barriers preventing you from taking care of your own finances?  No.    Are there any barriers preventing you from managing your medications?   No.    Are there any barriers preventing you from showering, bathing or dressing yourself?  No.    Are you currently engaging in any exercise or physical activity?  No.     What is your perception of your health?  Good.    Health Maintenance Summary                IMM DTaP/Tdap/Td Vaccine Overdue 3/23/1969     COLON CANCER SCREENING ANNUAL FIT Overdue 3/23/2000     BONE DENSITY Overdue 3/23/2015      Done 9/22/2005 DS-BONE DENSITY STUDY (DEXA)    MAMMOGRAM Overdue 12/15/2020      Done 6/15/2020 MA-DIAGNOSTIC MAMMO BILAT W/TOMOSYNTHESIS W/O CAD     Patient has more history with this topic...    IMM ZOSTER VACCINES Overdue 12/23/2020      Done 10/28/2020 Imm Admin: Zoster Vaccine Recombinant (RZV) (SHINGRIX)    Annual Wellness Visit Overdue 3/3/2021      Done 3/2/2020 LOS: ID ANNUAL WELLNESS VISIT-INCLUDES PPPS SUBSEQUE*     Patient has more history with this topic...          Patient Care Team:  Sara Mei D.O. as PCP - General (Family Medicine)  Vein [place]  as Consulting Physician (Vascular Surgery)  Mame Velasco C.N.A. (Inactive) as [facility]     Social History     Tobacco Use   • Smoking status: Never Smoker   • Smokeless tobacco: Never Used   Substance Use Topics   • Alcohol use: No   • Drug use: No     Family History   Problem Relation Age of Onset   • Lung Disease Mother         COPD    • Heart Attack Father    • No Known Problems Sister    • No Known Problems Brother    • No Known Problems Maternal Grandmother    • No Known Problems Maternal Grandfather    • No Known Problems Paternal Grandmother    • Heart Attack Paternal Grandfather    • Heart Disease Daughter    • No Known Problems Daughter    • Heart Disease Son      She  has a past medical history of Cancer (HCC) (2012), Dental disorder (09/11/2019), Diverticulitis, Hypertension, Morbid obesity ([facility]), Obesity, TOMI (obstructive sleep apnea), PE (pulmonary thromboembolism) ([facility]), Urinary bladder disorder,  Urinary incontinence (09/11/2019), Venous insufficiency of both lower extremities, and VSD (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 She also has no past medical history of Diabetes (HCC) or Renal disorder.   Past Surgical History:   Procedure Laterality Date   • UMBILICAL HERNIA REPAIR  9/17/2019    Procedure: REPAIR, HERNIA, UMBILICAL- INCARCERATED;  Surgeon: Truong Daniels M.D.;  Location: SURGERY SAME DAY [facility];  Service: General   • KNEE ARTHROPLASTY TOTAL Right 2014   • HYSTERECTOMY, VAGINAL     • VEIN STRIPPING             Exam:     Wt 122 kg (270 lb)  Body mass index is 44.93 kg/m².    Hearing {GOOD/FAIR/POOR/EXCELLENT:91616}.    Dentition {DENTITION:04814}  Alert, oriented in no acute distress.  Eye contact is good, speech goal directed, affect calm  ***    Assessment and Plan. The following treatment and monitoring plan is recommended:  ***  No diagnosis found.      Services suggested: { AWV COORDINATION OF SERVICES:53045}  Health Care Screening recommendations as per orders if indicated.  Referrals offered: PT/OT/Nutrition counseling/Behavioral Health/Smoking cessation as per orders if indicated.    Discussion today about general wellness and lifestyle habits:    · Prevent falls and reduce trip hazards; Cautioned about securing or removing rugs.  · Have a working fire alarm and carbon monoxide detector;   · Engage in regular physical activity and social activities       Follow-up: No follow-ups on file.

## 2021-03-18 ENCOUNTER — HOSPITAL ENCOUNTER (OUTPATIENT)
Dept: LAB | Facility: MEDICAL CENTER | Age: 71
End: 2021-03-18
Attending: FAMILY MEDICINE
Payer: MEDICARE

## 2021-03-18 DIAGNOSIS — I10 ESSENTIAL HYPERTENSION: ICD-10-CM

## 2021-03-18 DIAGNOSIS — R73.03 PREDIABETES: ICD-10-CM

## 2021-03-18 DIAGNOSIS — E55.9 VITAMIN D DEFICIENCY: ICD-10-CM

## 2021-03-18 DIAGNOSIS — E78.00 PURE HYPERCHOLESTEROLEMIA: ICD-10-CM

## 2021-03-18 LAB
25(OH)D3 SERPL-MCNC: 19 NG/ML (ref 30–100)
ALBUMIN SERPL BCP-MCNC: 4 G/DL (ref 3.2–4.9)
ALBUMIN/GLOB SERPL: 1.2 G/DL
ALP SERPL-CCNC: 86 U/L (ref 30–99)
ALT SERPL-CCNC: 11 U/L (ref 2–50)
ANION GAP SERPL CALC-SCNC: 7 MMOL/L (ref 7–16)
AST SERPL-CCNC: 15 U/L (ref 12–45)
BILIRUB SERPL-MCNC: 0.4 MG/DL (ref 0.1–1.5)
BUN SERPL-MCNC: 21 MG/DL (ref 8–22)
CALCIUM SERPL-MCNC: 9.5 MG/DL (ref 8.5–10.5)
CHLORIDE SERPL-SCNC: 100 MMOL/L (ref 96–112)
CHOLEST SERPL-MCNC: 212 MG/DL (ref 100–199)
CO2 SERPL-SCNC: 27 MMOL/L (ref 20–33)
CREAT SERPL-MCNC: 1.09 MG/DL (ref 0.5–1.4)
FASTING STATUS PATIENT QL REPORTED: NORMAL
GLOBULIN SER CALC-MCNC: 3.3 G/DL (ref 1.9–3.5)
GLUCOSE SERPL-MCNC: 91 MG/DL (ref 65–99)
HDLC SERPL-MCNC: 42 MG/DL
LDLC SERPL CALC-MCNC: 136 MG/DL
POTASSIUM SERPL-SCNC: 4.8 MMOL/L (ref 3.6–5.5)
PROT SERPL-MCNC: 7.3 G/DL (ref 6–8.2)
SODIUM SERPL-SCNC: 134 MMOL/L (ref 135–145)
TRIGL SERPL-MCNC: 171 MG/DL (ref 0–149)

## 2021-03-18 PROCEDURE — 83036 HEMOGLOBIN GLYCOSYLATED A1C: CPT

## 2021-03-18 PROCEDURE — 80053 COMPREHEN METABOLIC PANEL: CPT

## 2021-03-18 PROCEDURE — 36415 COLL VENOUS BLD VENIPUNCTURE: CPT

## 2021-03-18 PROCEDURE — 82306 VITAMIN D 25 HYDROXY: CPT

## 2021-03-18 PROCEDURE — 80061 LIPID PANEL: CPT

## 2021-03-19 ENCOUNTER — TELEPHONE (OUTPATIENT)
Dept: MEDICAL GROUP | Facility: PHYSICIAN GROUP | Age: 71
End: 2021-03-19

## 2021-03-19 LAB
EST. AVERAGE GLUCOSE BLD GHB EST-MCNC: 120 MG/DL
HBA1C MFR BLD: 5.8 % (ref 4–5.6)

## 2021-03-19 NOTE — TELEPHONE ENCOUNTER
----- Message from Sara Mei D.O. sent at 3/19/2021 10:46 AM PDT -----  Please call patient let her know the followin.  Vitamin D levels are low.  Based on her vitamin D levels, I do recommend taking a supplement of 2000 units daily.  If she is already taking a supplement, please let me know and I will send in a prescription dose supplement.  This is important for bone health especially postmenopausally.    2.  Cholesterol continues to increase.  I probably recommend a low-dose cholesterol medication at this point.  If she is open to doing so, let me know and I will send in the prescription.    3.  Prediabetes remains stable.    4.  Kidney function dropped somewhat.  This could be due to dehydration, but I would probably recommend that we recheck in 6 months to make sure she is not developing any type of kidney disease.    Please let me know about the vitamin D and the statin.

## 2021-03-22 RX ORDER — ATORVASTATIN CALCIUM 10 MG/1
10 TABLET, FILM COATED ORAL DAILY
Qty: 90 TABLET | Refills: 3 | Status: SHIPPED | OUTPATIENT
Start: 2021-03-22 | End: 2022-03-28 | Stop reason: SDUPTHER

## 2021-03-26 ENCOUNTER — HOSPITAL ENCOUNTER (EMERGENCY)
Facility: MEDICAL CENTER | Age: 71
End: 2021-03-26
Attending: EMERGENCY MEDICINE
Payer: MEDICARE

## 2021-03-26 ENCOUNTER — APPOINTMENT (OUTPATIENT)
Dept: RADIOLOGY | Facility: MEDICAL CENTER | Age: 71
End: 2021-03-26
Attending: EMERGENCY MEDICINE
Payer: MEDICARE

## 2021-03-26 VITALS
TEMPERATURE: 97.5 F | WEIGHT: 250 LBS | DIASTOLIC BLOOD PRESSURE: 65 MMHG | BODY MASS INDEX: 41.65 KG/M2 | HEART RATE: 60 BPM | SYSTOLIC BLOOD PRESSURE: 122 MMHG | OXYGEN SATURATION: 92 % | HEIGHT: 65 IN | RESPIRATION RATE: 12 BRPM

## 2021-03-26 DIAGNOSIS — N39.0 URINARY TRACT INFECTION WITHOUT HEMATURIA, SITE UNSPECIFIED: ICD-10-CM

## 2021-03-26 DIAGNOSIS — R10.9 ABDOMINAL PAIN, UNSPECIFIED ABDOMINAL LOCATION: ICD-10-CM

## 2021-03-26 LAB
ALBUMIN SERPL BCP-MCNC: 3.8 G/DL (ref 3.2–4.9)
ALBUMIN/GLOB SERPL: 1.2 G/DL
ALP SERPL-CCNC: 82 U/L (ref 30–99)
ALT SERPL-CCNC: 7 U/L (ref 2–50)
ANION GAP SERPL CALC-SCNC: 11 MMOL/L (ref 7–16)
APPEARANCE UR: CLEAR
AST SERPL-CCNC: 14 U/L (ref 12–45)
BACTERIA #/AREA URNS HPF: ABNORMAL /HPF
BASOPHILS # BLD AUTO: 0.6 % (ref 0–1.8)
BASOPHILS # BLD: 0.05 K/UL (ref 0–0.12)
BILIRUB SERPL-MCNC: 0.4 MG/DL (ref 0.1–1.5)
BILIRUB UR QL STRIP.AUTO: NEGATIVE
BUN SERPL-MCNC: 21 MG/DL (ref 8–22)
CALCIUM SERPL-MCNC: 9.2 MG/DL (ref 8.5–10.5)
CHLORIDE SERPL-SCNC: 102 MMOL/L (ref 96–112)
CO2 SERPL-SCNC: 21 MMOL/L (ref 20–33)
COLOR UR: YELLOW
CREAT SERPL-MCNC: 0.93 MG/DL (ref 0.5–1.4)
EOSINOPHIL # BLD AUTO: 0.15 K/UL (ref 0–0.51)
EOSINOPHIL NFR BLD: 1.9 % (ref 0–6.9)
EPI CELLS #/AREA URNS HPF: ABNORMAL /HPF
ERYTHROCYTE [DISTWIDTH] IN BLOOD BY AUTOMATED COUNT: 44.2 FL (ref 35.9–50)
GLOBULIN SER CALC-MCNC: 3.3 G/DL (ref 1.9–3.5)
GLUCOSE SERPL-MCNC: 115 MG/DL (ref 65–99)
GLUCOSE UR STRIP.AUTO-MCNC: NEGATIVE MG/DL
HCT VFR BLD AUTO: 38.5 % (ref 37–47)
HGB BLD-MCNC: 12.4 G/DL (ref 12–16)
HYALINE CASTS #/AREA URNS LPF: ABNORMAL /LPF
IMM GRANULOCYTES # BLD AUTO: 0.03 K/UL (ref 0–0.11)
IMM GRANULOCYTES NFR BLD AUTO: 0.4 % (ref 0–0.9)
KETONES UR STRIP.AUTO-MCNC: NEGATIVE MG/DL
LEUKOCYTE ESTERASE UR QL STRIP.AUTO: ABNORMAL
LIPASE SERPL-CCNC: 20 U/L (ref 11–82)
LYMPHOCYTES # BLD AUTO: 2.11 K/UL (ref 1–4.8)
LYMPHOCYTES NFR BLD: 27.1 % (ref 22–41)
MCH RBC QN AUTO: 29.7 PG (ref 27–33)
MCHC RBC AUTO-ENTMCNC: 32.2 G/DL (ref 33.6–35)
MCV RBC AUTO: 92.3 FL (ref 81.4–97.8)
MICRO URNS: ABNORMAL
MONOCYTES # BLD AUTO: 0.59 K/UL (ref 0–0.85)
MONOCYTES NFR BLD AUTO: 7.6 % (ref 0–13.4)
NEUTROPHILS # BLD AUTO: 4.87 K/UL (ref 2–7.15)
NEUTROPHILS NFR BLD: 62.4 % (ref 44–72)
NITRITE UR QL STRIP.AUTO: POSITIVE
NRBC # BLD AUTO: 0 K/UL
NRBC BLD-RTO: 0 /100 WBC
PH UR STRIP.AUTO: 5 [PH] (ref 5–8)
PLATELET # BLD AUTO: 230 K/UL (ref 164–446)
PMV BLD AUTO: 9.3 FL (ref 9–12.9)
POTASSIUM SERPL-SCNC: 4 MMOL/L (ref 3.6–5.5)
PROT SERPL-MCNC: 7.1 G/DL (ref 6–8.2)
PROT UR QL STRIP: NEGATIVE MG/DL
RBC # BLD AUTO: 4.17 M/UL (ref 4.2–5.4)
RBC # URNS HPF: ABNORMAL /HPF
RBC UR QL AUTO: NEGATIVE
SODIUM SERPL-SCNC: 134 MMOL/L (ref 135–145)
SP GR UR STRIP.AUTO: 1.02
UROBILINOGEN UR STRIP.AUTO-MCNC: 0.2 MG/DL
WBC # BLD AUTO: 7.8 K/UL (ref 4.8–10.8)
WBC #/AREA URNS HPF: ABNORMAL /HPF

## 2021-03-26 PROCEDURE — 85025 COMPLETE CBC W/AUTO DIFF WBC: CPT

## 2021-03-26 PROCEDURE — 96376 TX/PRO/DX INJ SAME DRUG ADON: CPT

## 2021-03-26 PROCEDURE — 96365 THER/PROPH/DIAG IV INF INIT: CPT | Mod: XU

## 2021-03-26 PROCEDURE — 700111 HCHG RX REV CODE 636 W/ 250 OVERRIDE (IP): Performed by: EMERGENCY MEDICINE

## 2021-03-26 PROCEDURE — 99285 EMERGENCY DEPT VISIT HI MDM: CPT

## 2021-03-26 PROCEDURE — 96375 TX/PRO/DX INJ NEW DRUG ADDON: CPT

## 2021-03-26 PROCEDURE — 93926 LOWER EXTREMITY STUDY: CPT | Mod: RT

## 2021-03-26 PROCEDURE — 93971 EXTREMITY STUDY: CPT | Mod: RT

## 2021-03-26 PROCEDURE — 80053 COMPREHEN METABOLIC PANEL: CPT

## 2021-03-26 PROCEDURE — 87040 BLOOD CULTURE FOR BACTERIA: CPT

## 2021-03-26 PROCEDURE — 700117 HCHG RX CONTRAST REV CODE 255: Performed by: EMERGENCY MEDICINE

## 2021-03-26 PROCEDURE — 81001 URINALYSIS AUTO W/SCOPE: CPT

## 2021-03-26 PROCEDURE — 74177 CT ABD & PELVIS W/CONTRAST: CPT | Mod: MG

## 2021-03-26 PROCEDURE — 700105 HCHG RX REV CODE 258: Performed by: EMERGENCY MEDICINE

## 2021-03-26 PROCEDURE — 83690 ASSAY OF LIPASE: CPT

## 2021-03-26 PROCEDURE — 36415 COLL VENOUS BLD VENIPUNCTURE: CPT

## 2021-03-26 RX ORDER — ACETAMINOPHEN 160 MG
2000 TABLET,DISINTEGRATING ORAL DAILY
Status: SHIPPED | COMMUNITY
End: 2021-10-12

## 2021-03-26 RX ORDER — HYDROMORPHONE HYDROCHLORIDE 1 MG/ML
0.5 INJECTION, SOLUTION INTRAMUSCULAR; INTRAVENOUS; SUBCUTANEOUS ONCE
Status: COMPLETED | OUTPATIENT
Start: 2021-03-26 | End: 2021-03-26

## 2021-03-26 RX ORDER — CEFDINIR 300 MG/1
300 CAPSULE ORAL 2 TIMES DAILY
Qty: 20 CAPSULE | Refills: 0 | Status: SHIPPED | OUTPATIENT
Start: 2021-03-26 | End: 2021-08-26

## 2021-03-26 RX ORDER — ACETAMINOPHEN 500 MG
1000 TABLET ORAL ONCE
Status: SHIPPED | COMMUNITY
End: 2021-10-12

## 2021-03-26 RX ORDER — HYDROMORPHONE HYDROCHLORIDE 1 MG/ML
1 INJECTION, SOLUTION INTRAMUSCULAR; INTRAVENOUS; SUBCUTANEOUS ONCE
Status: COMPLETED | OUTPATIENT
Start: 2021-03-26 | End: 2021-03-26

## 2021-03-26 RX ORDER — ASPIRIN 81 MG/1
81 TABLET ORAL EVERY MORNING
COMMUNITY

## 2021-03-26 RX ORDER — OXYCODONE HYDROCHLORIDE AND ACETAMINOPHEN 5; 325 MG/1; MG/1
1 TABLET ORAL EVERY 6 HOURS PRN
Qty: 12 TABLET | Refills: 0 | Status: SHIPPED | OUTPATIENT
Start: 2021-03-26 | End: 2021-03-29

## 2021-03-26 RX ADMIN — IOHEXOL 100 ML: 350 INJECTION, SOLUTION INTRAVENOUS at 03:33

## 2021-03-26 RX ADMIN — FENTANYL CITRATE 50 MCG: 50 INJECTION, SOLUTION INTRAMUSCULAR; INTRAVENOUS at 01:26

## 2021-03-26 RX ADMIN — HYDROMORPHONE HYDROCHLORIDE 1 MG: 1 INJECTION, SOLUTION INTRAMUSCULAR; INTRAVENOUS; SUBCUTANEOUS at 03:03

## 2021-03-26 RX ADMIN — CEFTRIAXONE SODIUM 2 G: 2 INJECTION, POWDER, FOR SOLUTION INTRAMUSCULAR; INTRAVENOUS at 04:13

## 2021-03-26 RX ADMIN — HYDROMORPHONE HYDROCHLORIDE: 1 INJECTION, SOLUTION INTRAMUSCULAR; INTRAVENOUS; SUBCUTANEOUS at 02:11

## 2021-03-26 ASSESSMENT — FIBROSIS 4 INDEX: FIB4 SCORE: 1.18

## 2021-03-26 NOTE — ED NOTES
"Pt discharged home w/  as ride, pt A&Ox4, RA. Used wc for d/c, pt educated on abx use and f/u appointment, pt verbalized understanding. IV discontinued and gauze placed, pt in possession of belongings. Pt provided discharge education and information pertaining to medications and follow up appointments. Pt received copy of discharge instructions and verbalized understanding. /65   Pulse 60   Temp 36.3 °C (97.3 °F) (Temporal)   Resp 12   Ht 1.651 m (5' 5\")   Wt 113 kg (250 lb)   SpO2 92%   BMI 41.60 kg/m²   "

## 2021-03-26 NOTE — ED NOTES
Med Rec completed: per patient at bedside with family   Preferred Pharmacy: [facility] [address]   Allergies:  Allergies   Allergen Reactions   • Mustard [Allyl Isothiocyanate] Anaphylaxis   • Prochlorperazine Edisylate [Compazine] Anaphylaxis   • Doxycycline Nausea and Unspecified     Causes headache and nausea       No ORAL antibiotics in last 14 days    Home Medications:  Medication Sig Comments   • aspirin (ASPIRIN 81) 81 MG EC tablet Take 81 mg by mouth every morning. AM   • Cholecalciferol (VITAMIN D3) 2000 UNIT Cap Take 2,000 Units by mouth every day. PM   • acetaminophen (TYLENOL) 500 MG Tab Take 1,000 mg by mouth one time.    • atorvastatin (LIPITOR) 10 MG Tab Take 1 tablet by mouth every day.  Patient not taking: Reported on 3/26/2021   • lisinopril (PRINIVIL) 30 MG tablet Take 1 tablet by mouth every day. AM   • hydroCHLOROthiazide (HYDRODIURIL) 25 MG Tab Take 1 Tab by mouth every day. AM

## 2021-03-26 NOTE — ED PROVIDER NOTES
ED Provider Note    ER PROVIDER NOTE      CHIEF COMPLAINT  Chief Complaint   Patient presents with   • Abdominal Pain       HPI  Fior Ray is a 71 y.o. female who presents to the emergency department complaining of right groin pain as well as shooting pain down her leg.  Patient reports she felt a charley horse in her leg, and began having the pain earlier this evening.  It seems to be primarily in her groin with some shooting pain down the leg as well.  There is no other abdominal pain or chest pain.  No real alleviating or aggravating factors.  She reports no focal weakness or numbness.  No recent illness fevers chills cough congestion or shortness of breath.  No dysuria or hematuria.    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 Pertinent negatives include no chest pain. See HPI for details. All other systems reviewed and are negative.    PAST MEDICAL HISTORY   has a past medical history of Cancer (HCC) (2012), Dental disorder (09/11/2019), Diverticulitis, Hypertension, Morbid obesity ([facility]), Obesity, TOMI (obstructive sleep apnea), PE (pulmonary thromboembolism) ([facility]), Urinary bladder disorder, Urinary incontinence (09/11/2019), Venous insufficiency of both lower extremities, and VSD (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    SURGICAL HISTORY   has a past surgical history that includes knee arthroplasty total (Right, 2014); hysterectomy, vaginal; vein stripping; and umbilical hernia repair (9/17/2019).    FAMILY HISTORY  Family History   Problem Relation Age of Onset   • Lung Disease Mother         COPD    • Heart Attack Father    • No Known Problems Sister    • No Known Problems Brother    • No Known Problems Maternal Grandmother    • No Known Problems Maternal Grandfather    • No Known Problems Paternal Grandmother    • Heart Attack Paternal Grandfather    • Heart Disease Daughter    • No Known Problems  Daughter    • Heart Disease Son        SOCIAL HISTORY  Social History     Socioeconomic History   • Marital status:      Spouse name: Not on file   • Number of children: Not on file   • Years of education: Not on file   • Highest education level: Not on file   Occupational History   • Not on file   Tobacco Use   • Smoking status: Never Smoker   • Smokeless tobacco: Never Used   Substance and Sexual Activity   • Alcohol use: No   • Drug use: No   • Sexual activity: Not on file   Other Topics Concern   • Not on file   Social History Narrative   • Not on file     Social Determinants of Health     Financial Resource Strain:    • Difficulty of Paying Living Expenses:    Food Insecurity:    • Worried About Running Out of Food in the Last Year:    • Ran Out of Food in the Last Year:    Transportation Needs:    • Lack of Transportation (Medical):    • Lack of Transportation (Non-Medical):    Physical Activity:    • Days of Exercise per Week:    • Minutes of Exercise per Session:    Stress:    • Feeling of Stress :    Social Connections:    • Frequency of Communication with Friends and Family:    • Frequency of Social Gatherings with Friends and Family:    • Attends [employer] Services:    • Active Member of Clubs or Organizations:    • Attends Club or Organization Meetings:    • Marital Status:    Intimate Partner Violence:    • Fear of Current or Ex-Partner:    • Emotionally Abused:    • Physically Abused:    • Sexually Abused:       Social History     Substance and Sexual Activity   Drug Use No       CURRENT MEDICATIONS  Home Medications     Reviewed by Les Townsend, PhT (Pharmacy Tech) on 03/26/21 at 0358  Med List Status: Complete   Medication Last Dose Status   acetaminophen (TYLENOL) 500 MG Tab 3/25/2021 Active   aspirin (ASPIRIN 81) 81 MG EC tablet 3/25/2021 Active   atorvastatin (LIPITOR) 10 MG Tab Not Taking Active   Cholecalciferol (VITAMIN D3) 2000 UNIT Cap 3/25/2021 Active  "  hydroCHLOROthiazide (HYDRODIURIL) 25 MG Tab 3/25/2021 Active   lisinopril (PRINIVIL) 30 MG tablet 3/25/2021 Active                ALLERGIES  Allergies   Allergen Reactions   • Mustard [Allyl Isothiocyanate] Anaphylaxis   • Prochlorperazine Edisylate [Compazine] Anaphylaxis   • Doxycycline Nausea and Unspecified     Causes headache and nausea       PHYSICAL EXAM  VITAL SIGNS: /68   Pulse (!) 57   Temp 36.3 °C (97.3 °F) (Temporal)   Resp 20   Ht 1.651 m (5' 5\")   Wt 113 kg (250 lb)   SpO2 98%   BMI 41.60 kg/m²   Pulse ox interpretation: I interpret this pulse ox as normal.    Constitutional: Alert mildly uncomfortable appearing  HENT: No signs of trauma, Bilateral external ears normal, Nose normal.   Eyes: Pupils are equal and reactive, Conjunctiva normal, Non-icteric.   Neck: Normal range of motion, No tenderness, Supple, No stridor.   Lymphatic: No lymphadenopathy noted.   Cardiovascular: Regular rate and rhythm, no murmurs.   Thorax & Lungs: Normal breath sounds, No respiratory distress, No wheezing, No chest tenderness.   Abdomen: Obese, bowel sounds normal, Soft, tender in the right inguinal crease, No masses, No pulsatile masses. No peritoneal signs.  No hernia noted  Skin: Warm, Dry, No erythema, No rash.   Back: No bony tenderness, No CVA tenderness.   Extremities: Radial pulses bilaterally, strong DP bilaterally, plus edema with the right lower extremity, 1+ on the left, patient reports this is chronic no tenderness, No cyanosis,  Musculoskeletal: Good range of motion in all major joints. No tenderness to palpation or major deformities noted.   Neurologic: Alert , Normal motor function, Normal sensory function, No focal deficits noted.   Psychiatric: Affect normal, Judgment normal, Mood normal.     DIAGNOSTIC STUDIES / PROCEDURES        LABS  Labs Reviewed   CBC WITH DIFFERENTIAL - Abnormal; Notable for the following components:       Result Value    RBC 4.17 (*)     MCHC 32.2 (*)     All other " "components within normal limits   COMP METABOLIC PANEL - Abnormal; Notable for the following components:    Sodium 134 (*)     Glucose 115 (*)     All other components within normal limits   URINALYSIS - Abnormal; Notable for the following components:    Nitrite Positive (*)     Leukocyte Esterase Trace (*)     All other components within normal limits   ESTIMATED GFR - Abnormal; Notable for the following components:    GFR If Non  59 (*)     All other components within normal limits   URINE MICROSCOPIC (W/UA) - Abnormal; Notable for the following components:    WBC 5-10 (*)     Bacteria Many (*)     All other components within normal limits   LIPASE   BLOOD CULTURE    Narrative:     Per Hospital Policy: Only change Specimen Src: to \"Line\" if  specified by physician order.   BLOOD CULTURE    Narrative:     Per Hospital Policy: Only change Specimen Src: to \"Line\" if  specified by physician order.       All labs reviewed by me.    RADIOLOGY  CT-ABDOMEN-PELVIS WITH   Final Result      Diverticulosis without evidence of diverticulitis.      US-EXTREMITY ARTERY LOWER UNILAT RIGHT   Final Result      US-EXTREMITY VENOUS LOWER UNILAT RIGHT   Final Result        The radiologist's interpretation of all radiological studies have been reviewed and images independently viewed by me.    COURSE & MEDICAL DECISION MAKING  Nursing notes, VS, PMSFHx reviewed in chart.    12:39 AM Patient seen and examined at bedside. Patient will be treated with fentanyl. Ordered for CBC, CMP, urinalysis, CT, ultrasounds to evaluate her symptoms.     1:50 AM  Patient is reevaluated, still having pink additional pain medication ordered    2:50 AM  Patient reevaluated, still having pain, additional medication ordered    4:37 AM  Patient is reevaluated, updated on all results, she is feeling improved we will plan for discharge        Decision Making:  This is a 71 y.o. female presenting with some inguinal pain as well as pain shooting " down her leg.  Given her history did obtain extensive work-up, ultrasound shows no evidence of DVT.  Her exam and ultrasound show evidence of vascular occlusion.  Obtained a CT of her abdomen and there is no evidence of surgical intra-abdominal pathology such as appendicitis or perforation or obstruction.  Her labs are unrevealing other than a UTI.  Her pain has improved, and given this as well as her diagnostics suggests against ischemia to her bowels.  She is given a prescription for pain medication, as well as antibiotic, will return here for recheck or follow-up with her primary care doctor for recheck.    I reviewed prescription monitoring program for patient's narcotic use before prescribing a scheduled drug.The patient will not drink alcohol nor drive with prescribed medications.The patient will return for new or worsening symptoms and is stable at the time of discharge.    The patient is referred to a primary physician for blood pressure management, diabetic screening, and for all other preventative health concerns.  In prescribing controlled substances to this patient, I certify that I have obtained and reviewed the medical history of Fior Ray. I have also made a good koby effort to obtain applicable records from other providers who have treated the patient and records did not demonstrate any increased risk of substance abuse that would prevent me from prescribing controlled substances.     I have conducted a physical exam and documented it. I have reviewed Ms. Ray’s prescription history as maintained by the [place] Prescription Monitoring Program.     I have assessed the patient’s risk for abuse, dependency, and addiction using the validated Opioid Risk Tool available at https://www.mdcalc.com/flrmbw-ewyt-opyr-ort-narcotic-abuse.     Given the above, I believe the benefits of controlled substance therapy outweigh the risks. The reasons for prescribing controlled substances include  non-narcotic, oral analgesic alternatives have been inadequate for pain control. Accordingly, I have discussed the risk and benefits, treatment plan, and alternative therapies with the patient.         DISPOSITION:  Patient will be discharged home in stable condition.    FOLLOW UP:  Sara Mei D.O.  [address]  [facility] [id number] [phone]    In 1 day      [facility] Emergency Dept  [address] [place] [address] [phone]  In 1 day        OUTPATIENT MEDICATIONS:  New Prescriptions    CEFDINIR (OMNICEF) 300 MG CAP    Take 1 capsule by mouth 2 times a day.    OXYCODONE-ACETAMINOPHEN (PERCOCET) 5-325 MG TAB    Take 1 tablet by mouth every 6 hours as needed for up to 3 days.       FINAL IMPRESSION  1. Urinary tract infection without hematuria, site unspecified    2. Abdominal pain, unspecified abdominal location         The note accurately reflects work and decisions made by me.  Aidan Clarke M.D.  3/26/2021  4:40 AM

## 2021-03-26 NOTE — ED TRIAGE NOTES
Fior Ray  71 y.o. female  Chief Complaint   Patient presents with   • Abdominal Pain     Pt reports sudden RLQ sharp pain that started at 1700, radiates down RLE. Unknown if pt has appendix. History of hypertension, filter for prior DVT and PE, and VSD. Bilateral pressures taken: /70, /84.

## 2021-03-30 ENCOUNTER — OFFICE VISIT (OUTPATIENT)
Dept: MEDICAL GROUP | Facility: PHYSICIAN GROUP | Age: 71
End: 2021-03-30
Payer: MEDICARE

## 2021-03-30 ENCOUNTER — TELEPHONE (OUTPATIENT)
Dept: MEDICAL GROUP | Facility: PHYSICIAN GROUP | Age: 71
End: 2021-03-30

## 2021-03-30 ENCOUNTER — HOSPITAL ENCOUNTER (OUTPATIENT)
Dept: RADIOLOGY | Facility: MEDICAL CENTER | Age: 71
End: 2021-03-30
Attending: FAMILY MEDICINE
Payer: MEDICARE

## 2021-03-30 VITALS
SYSTOLIC BLOOD PRESSURE: 130 MMHG | BODY MASS INDEX: 44.98 KG/M2 | HEART RATE: 74 BPM | TEMPERATURE: 98.3 F | WEIGHT: 270 LBS | OXYGEN SATURATION: 96 % | HEIGHT: 65 IN | DIASTOLIC BLOOD PRESSURE: 68 MMHG

## 2021-03-30 DIAGNOSIS — M25.551 RIGHT HIP PAIN: ICD-10-CM

## 2021-03-30 DIAGNOSIS — M79.604 RIGHT LEG PAIN: ICD-10-CM

## 2021-03-30 PROCEDURE — 73502 X-RAY EXAM HIP UNI 2-3 VIEWS: CPT | Mod: RT

## 2021-03-30 PROCEDURE — 99214 OFFICE O/P EST MOD 30 MIN: CPT | Performed by: FAMILY MEDICINE

## 2021-03-30 RX ORDER — OXYCODONE AND ACETAMINOPHEN 10; 325 MG/1; MG/1
1 TABLET ORAL EVERY 6 HOURS PRN
Qty: 20 TABLET | Refills: 0 | Status: SHIPPED | OUTPATIENT
Start: 2021-03-30 | End: 2021-03-30 | Stop reason: SDUPTHER

## 2021-03-30 RX ORDER — OXYCODONE AND ACETAMINOPHEN 10; 325 MG/1; MG/1
1 TABLET ORAL 3 TIMES DAILY PRN
Qty: 15 TABLET | Refills: 0 | Status: SHIPPED | OUTPATIENT
Start: 2021-03-30 | End: 2021-04-04

## 2021-03-30 ASSESSMENT — FIBROSIS 4 INDEX: FIB4 SCORE: 1.63

## 2021-03-30 NOTE — PROGRESS NOTES
CC: Right hip and leg pain    HISTORY OF THE PRESENT ILLNESS: Patient is a 71 y.o. female.     Patient is here today with concern for right inner leg pain.  Onset was very acute last week and pain was noted to be very severe.  Located in the upper medial right thigh.  Patient notes it hurts to bear weight or lift leg.  She was actually seen in the emergency department for this issue on March 26.  She underwent venous and arterial ultrasounds due to her history of DVT which were negative for any vascular issue.  She also had abdominal CT which was essentially noncontributory.  Urinalysis was positive for nitrites and she was started on antibiotic for UTI.  She never had any abdominal pain or urinary tract symptoms.    Pain has continued and not improved.  She does have chronic lymphedema in this leg.  Swelling is not worse than baseline.  No history of hip issues.  No known injury.  Notes that she just stood up and had excruciating pain.    Had normal bone density test in 2005.    Allergies: Mustard [allyl isothiocyanate], Prochlorperazine edisylate [compazine], and Doxycycline    Current Outpatient Medications Ordered in Epic   Medication Sig Dispense Refill   • oxyCODONE-acetaminophen (PERCOCET-10)  MG Tab Take 1 tablet by mouth every 6 hours as needed for Severe Pain for up to 5 days. 20 tablet 0   • aspirin (ASPIRIN 81) 81 MG EC tablet Take 81 mg by mouth every morning.     • Cholecalciferol (VITAMIN D3) 2000 UNIT Cap Take 2,000 Units by mouth every day.     • acetaminophen (TYLENOL) 500 MG Tab Take 1,000 mg by mouth one time.     • cefdinir (OMNICEF) 300 MG Cap Take 1 capsule by mouth 2 times a day. 20 capsule 0   • lisinopril (PRINIVIL) 30 MG tablet Take 1 tablet by mouth every day. 100 tablet 3   • hydroCHLOROthiazide (HYDRODIURIL) 25 MG Tab Take 1 Tab by mouth every day. 90 Tab 1   • atorvastatin (LIPITOR) 10 MG Tab Take 1 tablet by mouth every day. (Patient not taking: Reported on 3/26/2021) 90 tablet  3     No current [facility]-ordered facility-administered medications on file.       Past Medical History:   Diagnosis Date   • Cancer ([facility]) 2012    skin   • Dental disorder 09/11/2019    upper denture   • Diverticulitis    • Hypertension    • Morbid obesity ([facility])    • Obesity    • TOMI (obstructive sleep apnea)    • PE (pulmonary thromboembolism) ([facility])    • Urinary bladder disorder    • Urinary incontinence 09/11/2019   • Venous insufficiency of both lower extremities    • VSD (ventricular septal defect and aortic arch hypoplasia        Past Surgical History:   Procedure Laterality Date   • UMBILICAL HERNIA REPAIR  9/17/2019    Procedure: REPAIR, HERNIA, UMBILICAL- INCARCERATED;  Surgeon: Truong Daniels M.D.;  Location: SURGERY SAME DAY [facility];  Service: General   • KNEE ARTHROPLASTY TOTAL Right 2014   • HYSTERECTOMY, VAGINAL     • VEIN STRIPPING         Social History     Tobacco Use   • Smoking status: Never Smoker   • Smokeless tobacco: Never Used   Substance Use Topics   • Alcohol use: No   • Drug use: No       Social History     Social History Narrative   • Not on file       Family History   Problem Relation Age of Onset   • Lung Disease Mother         COPD    • Heart Attack Father    • No Known Problems Sister    • No Known Problems Brother    • No Known Problems Maternal Grandmother    • No Known Problems Maternal Grandfather    • No Known Problems Paternal Grandmother    • Heart Attack Paternal Grandfather    • Heart Disease Daughter    • No Known Problems Daughter    • Heart Disease Son        ROS:   Denies fever, chest pain, shortness of breath      Labs: Labs reviewed from March 26, 2021.  Imaging: CT abdomen pelvis, arterial and venous ultrasounds of lower extremity were reviewed from March 26, 2021.  Hip x-ray today was also unremarkable.    Exam: /68 (BP Location: Right arm, Patient Position: Sitting, BP Cuff Size: Large adult)   Pulse 74   Temp 36.8 °C (98.3 °F) (Temporal)   Ht 1.651 m (5'  "5\")   Wt 122 kg (270 lb)   SpO2 96%  Body mass index is 44.93 kg/m².    General: Well appearing, NAD  Skin: Warm and dry.  No obvious lesions.  Musculoskeletal: Patient has difficulty ambulating due to pain in upper right leg.  She is exquisitely tender to palpation in the medial groin area and abductor area in addition to outer hip area along the acetabular joint.  She has difficulty with hip flexion, due to weakness.   Psych: Normal mood and affect. Alert and oriented. Judgment and insight is normal.    Please note that this dictation was created using voice recognition software. I have made every reasonable attempt to correct obvious errors, but I expect that there are errors of grammar and possibly content that I did not discover before finalizing the note.      Assessment/Plan  Fior was seen today for hospital follow-up.    Diagnoses and all orders for this visit:    Right leg pain  Right hip pain  Acute issues for the patient.  Based on exam, I was initially concerned for possible hip pathology.  However, hip x-ray was completely normal.  I discussed with radiology reading room, given that she has an IVC filter which precludes her from getting MRI, it was recommended to get lower extremity CT with contrast which has been ordered.  Patient informed.  -     oxyCODONE-acetaminophen (PERCOCET-10)  MG Tab; Take 1 tablet by mouth every 6 hours as needed for Severe Pain for up to 5 days.  -     DX-HIP-COMPLETE - UNILATERAL 2+ RIGHT; Future        -     CT lower extremity with contrast  Obtained and reviewed patient utilization report from [facility] pharmacy database on 3/30/2021 3:17 PM  prior to writing prescription for controlled substance II, III or IV per [place] bill . Based on assessment of the report,my physical exam if necessary and the patient's health problem, the prescription is medically necessary.     Follow-up interval pending above work-up.    Sara Mei,   [facility]

## 2021-03-30 NOTE — TELEPHONE ENCOUNTER
[facility] will not fill greater than 60 morphine equivalents daily for opiates. Chelsie the pharmacist there stated that the percocet 10 mg they will fill at a max for tid.

## 2021-03-31 LAB
BACTERIA BLD CULT: NORMAL
BACTERIA BLD CULT: NORMAL
SIGNIFICANT IND 70042: NORMAL
SIGNIFICANT IND 70042: NORMAL
SITE SITE: NORMAL
SITE SITE: NORMAL
SOURCE SOURCE: NORMAL
SOURCE SOURCE: NORMAL

## 2021-04-07 ENCOUNTER — TELEPHONE (OUTPATIENT)
Dept: MEDICAL GROUP | Facility: PHYSICIAN GROUP | Age: 71
End: 2021-04-07

## 2021-04-07 ENCOUNTER — HOSPITAL ENCOUNTER (OUTPATIENT)
Dept: RADIOLOGY | Facility: MEDICAL CENTER | Age: 71
End: 2021-04-07
Attending: FAMILY MEDICINE
Payer: MEDICARE

## 2021-04-07 DIAGNOSIS — M79.604 RIGHT LEG PAIN: ICD-10-CM

## 2021-04-07 DIAGNOSIS — M25.551 RIGHT HIP PAIN: ICD-10-CM

## 2021-04-07 DIAGNOSIS — F40.240 CLAUSTROPHOBIA: ICD-10-CM

## 2021-04-07 PROCEDURE — 73701 CT LOWER EXTREMITY W/DYE: CPT | Mod: RT

## 2021-04-07 PROCEDURE — 700117 HCHG RX CONTRAST REV CODE 255: Performed by: FAMILY MEDICINE

## 2021-04-07 RX ORDER — LORAZEPAM 0.5 MG/1
TABLET ORAL
Qty: 2 TABLET | Refills: 0 | Status: SHIPPED | OUTPATIENT
Start: 2021-04-07 | End: 2021-04-07

## 2021-04-07 RX ADMIN — IOHEXOL 100 ML: 350 INJECTION, SOLUTION INTRAVENOUS at 16:30

## 2021-04-07 NOTE — TELEPHONE ENCOUNTER
Jeison from [facility] (ph [phone]) called. Patient was not able to complete ct scan due to anxiety. Would it be possible to prescribe her something for that so she can reschedule? Thank you.

## 2021-04-09 DIAGNOSIS — M25.551 RIGHT HIP PAIN: ICD-10-CM

## 2021-04-09 DIAGNOSIS — M79.604 RIGHT LEG PAIN: ICD-10-CM

## 2021-08-12 ENCOUNTER — PATIENT OUTREACH (OUTPATIENT)
Dept: HEALTH INFORMATION MANAGEMENT | Facility: OTHER | Age: 71
End: 2021-08-12

## 2021-10-06 ENCOUNTER — TELEPHONE (OUTPATIENT)
Dept: MEDICAL GROUP | Facility: PHYSICIAN GROUP | Age: 71
End: 2021-10-06

## 2021-10-06 NOTE — TELEPHONE ENCOUNTER
ESTABLISHED PATIENT PRE-VISIT PLANNING     Patient was NOT contacted to complete PVP.     Note: Patient will not be contacted if there is no indication to call.     1.  Reviewed notes from the last few office visits within the medical group: Yes    2.  If any orders were placed at last visit or intended to be done for this visit (i.e. 6 mos follow-up), do we have Results/Consult Notes?         •  Labs - Labs were not ordered at last office visit.  Note: If patient appointment is for lab review and patient did not complete labs, check with provider if OK to reschedule patient until labs completed.       •  Imaging - Imaging ordered, completed and results are in chart.       •  Referrals - Referral ordered, patient was seen and consult notes are in chart. Care Teams updated  YES.    3. Is this appointment scheduled as a Hospital Follow-Up? No    4.  Immunizations were updated in Epic using Reconcile Outside Information activity? Yes    5.  Patient is due for the following Health Maintenance Topics:   Health Maintenance Due   Topic Date Due   • COLORECTAL CANCER SCREENING  Never done   • IMM DTaP/Tdap/Td Vaccine (1 - Tdap) Never done   • BONE DENSITY  03/23/2015   • MAMMOGRAM  12/15/2020   • IMM INFLUENZA (1) 09/01/2021       6.  AHA (Pulse8) form printed for Provider? Yes

## 2021-10-12 ENCOUNTER — OFFICE VISIT (OUTPATIENT)
Dept: MEDICAL GROUP | Facility: PHYSICIAN GROUP | Age: 71
End: 2021-10-12
Payer: MEDICARE

## 2021-10-12 VITALS
DIASTOLIC BLOOD PRESSURE: 58 MMHG | WEIGHT: 262 LBS | HEIGHT: 63 IN | SYSTOLIC BLOOD PRESSURE: 122 MMHG | TEMPERATURE: 97.6 F | HEART RATE: 76 BPM | OXYGEN SATURATION: 95 % | BODY MASS INDEX: 46.42 KG/M2

## 2021-10-12 DIAGNOSIS — I10 ESSENTIAL HYPERTENSION: ICD-10-CM

## 2021-10-12 DIAGNOSIS — R73.03 PREDIABETES: ICD-10-CM

## 2021-10-12 DIAGNOSIS — Z12.12 SCREENING FOR COLORECTAL CANCER: ICD-10-CM

## 2021-10-12 DIAGNOSIS — Z12.11 SCREENING FOR COLORECTAL CANCER: ICD-10-CM

## 2021-10-12 DIAGNOSIS — Z23 NEED FOR VACCINATION: ICD-10-CM

## 2021-10-12 LAB
HBA1C MFR BLD: 5.7 % (ref 0–5.6)
INT CON NEG: NEGATIVE
INT CON POS: POSITIVE

## 2021-10-12 PROCEDURE — 83036 HEMOGLOBIN GLYCOSYLATED A1C: CPT | Performed by: FAMILY MEDICINE

## 2021-10-12 PROCEDURE — 90662 IIV NO PRSV INCREASED AG IM: CPT | Performed by: FAMILY MEDICINE

## 2021-10-12 PROCEDURE — 99214 OFFICE O/P EST MOD 30 MIN: CPT | Mod: 25 | Performed by: FAMILY MEDICINE

## 2021-10-12 PROCEDURE — G0008 ADMIN INFLUENZA VIRUS VAC: HCPCS | Performed by: FAMILY MEDICINE

## 2021-10-12 ASSESSMENT — FIBROSIS 4 INDEX: FIB4 SCORE: 1.63

## 2021-10-12 NOTE — PROGRESS NOTES
CC: Hypertension    HISTORY OF THE PRESENT ILLNESS: Patient is a 71 y.o. female.     Is here today to get flu shot and to follow-up on chronic health conditions including hypertension and prediabetes.  She has no other concerns during today's visit.    Allergies: Mustard [allyl isothiocyanate], Prochlorperazine edisylate [compazine], and Doxycycline    Current Outpatient Medications Ordered in Epic   Medication Sig Dispense Refill   • aspirin (ASPIRIN 81) 81 MG EC tablet Take 81 mg by mouth every morning.     • atorvastatin (LIPITOR) 10 MG Tab Take 1 tablet by mouth every day. 90 tablet 3   • lisinopril (PRINIVIL) 30 MG tablet Take 1 tablet by mouth every day. 100 tablet 3   • hydroCHLOROthiazide (HYDRODIURIL) 25 MG Tab Take 1 Tab by mouth every day. 90 Tab 1     No current Epic-ordered facility-administered medications on file.       Past Medical History:   Diagnosis Date   • Cancer ([facility]) 2012    skin   • Dental disorder 09/11/2019    upper denture   • Diverticulitis    • Hypertension    • Morbid obesity ([facility])    • Obesity    • TOMI (obstructive sleep apnea)    • PE (pulmonary thromboembolism) ([facility])    • Urinary bladder disorder    • Urinary incontinence 09/11/2019   • Venous insufficiency of both lower extremities    • VSD (ventricular septal defect and aortic arch hypoplasia        Past Surgical History:   Procedure Laterality Date   • UMBILICAL HERNIA REPAIR  9/17/2019    Procedure: REPAIR, HERNIA, UMBILICAL- INCARCERATED;  Surgeon: Truong Daniels M.D.;  Location: SURGERY SAME DAY [facility];  Service: General   • KNEE ARTHROPLASTY TOTAL Right 2014   • HYSTERECTOMY, VAGINAL     • VEIN STRIPPING         Social History     Tobacco Use   • Smoking status: Never Smoker   • Smokeless tobacco: Never Used   Vaping Use   • Vaping Use: Never used   Substance Use Topics   • Alcohol use: No   • Drug use: No       Social History     Social History Narrative   • Not on file       Family History   Problem Relation Age of  "Onset   • Lung Disease Mother         COPD    • Heart Attack Father    • No Known Problems Sister    • No Known Problems Brother    • No Known Problems Maternal Grandmother    • No Known Problems Maternal Grandfather    • No Known Problems Paternal Grandmother    • Heart Attack Paternal Grandfather    • Heart Disease Daughter    • No Known Problems Daughter    • Heart Disease Son          Labs: Point-of-care A1c 5.7 in clinic today.      Exam: /58 (BP Location: Right arm, Patient Position: Sitting, BP Cuff Size: Large adult)   Pulse 76   Temp 36.4 °C (97.6 °F) (Temporal)   Ht 1.588 m (5' 2.5\")   Wt 119 kg (262 lb)   SpO2 95%  Body mass index is 47.16 kg/m².    General: Well appearing, NAD  Pulmonary: Clear to ausculation.  Normal effort. No rales, ronchi, or wheezing.  Cardiovascular: Regular rate and rhythm without murmur, rubs or gallop.   Psych: Normal mood and affect. Alert and oriented. Judgment and insight is normal.    Please note that this dictation was created using voice recognition software. I have made every reasonable attempt to correct obvious errors, but I expect that there are errors of grammar and possibly content that I did not discover before finalizing the note.      Assessment/Plan  Fior was seen today for follow-up and immunizations.    Diagnoses and all orders for this visit:    Need for vaccination  -     INFLUENZA VACCINE, HIGH DOSE (65+ ONLY)    Prediabetes   Chronic issue, not currently on medication.  A1c of 5.7.  We will continue to monitor.  -     POCT  A1C    Screening for colorectal cancer  -     OCCULT BLOOD FECES IMMUNOASSAY (FIT); Future    Essential hypertension  Patient was concerned blood pressure may be high today, she recently had a friend passed away in a tragic accident.  Blood pressure is within normal limits today.  We will continue lisinopril and hydrochlorothiazide.    Of note, patient is due for mammography.  She is supposed to be on every 6 month plan at " this time due to abnormal mammogram.  She states that she received a letter from her insurance company stating that she was not yet due for mammography.  Advised to call scheduling.  If she has any issues getting the mammogram scheduled, she agrees to let me know so that I can address this with her insurance company.    Follow-up in about 5 to 6 months or sooner if needed.    Sara Mei, DO  [facility]

## 2021-10-27 ENCOUNTER — TELEPHONE (OUTPATIENT)
Dept: HEALTH INFORMATION MANAGEMENT | Facility: OTHER | Age: 71
End: 2021-10-27

## 2021-10-27 NOTE — TELEPHONE ENCOUNTER
Outcome: Left Message    Please transfer to Patient Outreach Team at [phone] when patient returns call.      [facility] Verified: yes    Attempt # 1

## 2022-03-26 ENCOUNTER — TELEPHONE (OUTPATIENT)
Dept: SCHEDULING | Facility: IMAGING CENTER | Age: 72
End: 2022-03-26
Payer: MEDICARE

## 2022-03-28 ENCOUNTER — OFFICE VISIT (OUTPATIENT)
Dept: MEDICAL GROUP | Facility: PHYSICIAN GROUP | Age: 72
End: 2022-03-28
Payer: MEDICARE

## 2022-03-28 VITALS
RESPIRATION RATE: 18 BRPM | HEART RATE: 86 BPM | SYSTOLIC BLOOD PRESSURE: 110 MMHG | HEIGHT: 63 IN | TEMPERATURE: 98.2 F | DIASTOLIC BLOOD PRESSURE: 70 MMHG | BODY MASS INDEX: 45.71 KG/M2 | OXYGEN SATURATION: 96 % | WEIGHT: 258 LBS

## 2022-03-28 DIAGNOSIS — I70.0 AORTIC ATHEROSCLEROSIS (HCC): ICD-10-CM

## 2022-03-28 DIAGNOSIS — R79.89 LOW VITAMIN D LEVEL: ICD-10-CM

## 2022-03-28 DIAGNOSIS — Z12.31 ENCOUNTER FOR SCREENING MAMMOGRAM FOR BREAST CANCER: ICD-10-CM

## 2022-03-28 DIAGNOSIS — Z13.29 SCREENING FOR ENDOCRINE DISORDER: ICD-10-CM

## 2022-03-28 DIAGNOSIS — Z23 NEED FOR VACCINATION: ICD-10-CM

## 2022-03-28 DIAGNOSIS — Z12.11 SPECIAL SCREENING FOR MALIGNANT NEOPLASM OF COLON: ICD-10-CM

## 2022-03-28 DIAGNOSIS — E78.00 PURE HYPERCHOLESTEROLEMIA: ICD-10-CM

## 2022-03-28 DIAGNOSIS — M17.12 PRIMARY OSTEOARTHRITIS OF LEFT KNEE: ICD-10-CM

## 2022-03-28 DIAGNOSIS — E66.01 MORBID OBESITY (HCC): ICD-10-CM

## 2022-03-28 DIAGNOSIS — R73.03 PREDIABETES: ICD-10-CM

## 2022-03-28 DIAGNOSIS — Z79.899 HIGH RISK MEDICATION USE: ICD-10-CM

## 2022-03-28 DIAGNOSIS — H93.8X1 ABNORMAL SENSATION IN RIGHT EAR: Primary | ICD-10-CM

## 2022-03-28 DIAGNOSIS — I10 ESSENTIAL HYPERTENSION: ICD-10-CM

## 2022-03-28 PROCEDURE — 90471 IMMUNIZATION ADMIN: CPT | Performed by: NURSE PRACTITIONER

## 2022-03-28 PROCEDURE — 90715 TDAP VACCINE 7 YRS/> IM: CPT | Performed by: NURSE PRACTITIONER

## 2022-03-28 PROCEDURE — 99214 OFFICE O/P EST MOD 30 MIN: CPT | Mod: 25 | Performed by: NURSE PRACTITIONER

## 2022-03-28 RX ORDER — ATORVASTATIN CALCIUM 10 MG/1
10 TABLET, FILM COATED ORAL DAILY
Qty: 100 TABLET | Refills: 3 | Status: SHIPPED | OUTPATIENT
Start: 2022-03-28 | End: 2022-09-22 | Stop reason: SDUPTHER

## 2022-03-28 RX ORDER — LISINOPRIL 30 MG/1
30 TABLET ORAL DAILY
Qty: 100 TABLET | Refills: 3 | Status: SHIPPED | OUTPATIENT
Start: 2022-03-28 | End: 2022-09-22 | Stop reason: SDUPTHER

## 2022-03-28 ASSESSMENT — FIBROSIS 4 INDEX: FIB4 SCORE: 1.66

## 2022-03-28 ASSESSMENT — PATIENT HEALTH QUESTIONNAIRE - PHQ9: CLINICAL INTERPRETATION OF PHQ2 SCORE: 0

## 2022-03-29 PROBLEM — I10 ESSENTIAL HYPERTENSION: Chronic | Status: ACTIVE | Noted: 2018-10-12

## 2022-03-29 PROBLEM — H93.8X1 ABNORMAL SENSATION IN RIGHT EAR: Chronic | Status: ACTIVE | Noted: 2022-03-29

## 2022-03-29 PROBLEM — H93.8X1 ABNORMAL SENSATION IN RIGHT EAR: Status: ACTIVE | Noted: 2022-03-29

## 2022-03-29 PROBLEM — E78.00 PURE HYPERCHOLESTEROLEMIA: Chronic | Status: ACTIVE | Noted: 2019-05-10

## 2022-03-29 PROBLEM — R73.03 PREDIABETES: Chronic | Status: ACTIVE | Noted: 2019-01-23

## 2022-03-29 PROBLEM — J96.11 CHRONIC RESPIRATORY FAILURE WITH HYPOXIA (HCC): Chronic | Status: RESOLVED | Noted: 2019-01-21 | Resolved: 2022-03-29

## 2022-03-29 PROBLEM — M17.12 LEFT KNEE DJD: Chronic | Status: ACTIVE | Noted: 2022-03-28

## 2022-03-29 PROBLEM — D68.9 COAGULOPATHY (HCC): Status: RESOLVED | Noted: 2018-10-12 | Resolved: 2022-03-29

## 2022-03-29 PROBLEM — I70.0 AORTIC ATHEROSCLEROSIS (HCC): Status: ACTIVE | Noted: 2022-03-29

## 2022-03-29 PROBLEM — I70.0 AORTIC ATHEROSCLEROSIS (HCC): Chronic | Status: ACTIVE | Noted: 2022-03-29

## 2022-03-29 PROBLEM — J96.11 CHRONIC RESPIRATORY FAILURE WITH HYPOXIA (HCC): Chronic | Status: ACTIVE | Noted: 2019-01-21

## 2022-03-29 NOTE — PROGRESS NOTES
Subjective:     CC: The primary encounter diagnosis was Abnormal sensation in right ear. Diagnoses of Aortic atherosclerosis (HCC), Morbid obesity (HCC), Primary osteoarthritis of left knee, Essential hypertension, Pure hypercholesterolemia, Prediabetes, Low vitamin D level, Need for vaccination, Encounter for screening mammogram for breast cancer, Special screening for malignant neoplasm of colon, High risk medication use, and Screening for endocrine disorder were also pertinent to this visit.      HPI:   Fior is a new patient to me today wanting to establish care.  We discussed the following problems:    1. Abnormal sensation in right ear  Acute condition.  Patient here for evaluation today.    2. Aortic atherosclerosis (HCC)  Chronic condition, stable.  Patient here for evaluation today.    3. Morbid obesity (HCC)  Chronic condition, stable.  Patient here for evaluation today.    4. Primary osteoarthritis of left knee  Chronic condition, stable.  Patient would like referral to Dr. Soto for repeat steroid injection.    5. Essential hypertension  Chronic condition, stable.  Patient here for evaluation today.    6. Pure hypercholesterolemia  Chronic condition, unstable.  Patient here for evaluation today.    7. Prediabetes  Chronic condition, stable.  Patient here for evaluation today.    8. Low vitamin D level  Chronic condition, stable.  Patient due for updated labs today.    9. Need for vaccination  Patient offered and given tetanus vaccine today.    10. Encounter for screening mammogram for breast cancer  Screening mammogram ordered for patient today.  She is on 6-month mammogram surveillance for calcifications.    11. Special screening for malignant neoplasm of colon  Order placed for screening colonoscopy today.    12. High risk medication use  Lab orders placed for high risk medication use.    13. Screening for endocrine disorder  Lab orders placed for screening for endocrine disorder.       Past Medical  "History:   Diagnosis Date   • Cancer ([facility]) 2012    skin   • Dental disorder 09/11/2019    upper denture   • Diverticulitis    • Hypertension    • Morbid obesity ([facility])    • Obesity    • TOMI (obstructive sleep apnea)    • PE (pulmonary thromboembolism) ([facility])    • Urinary bladder disorder    • Urinary incontinence 09/11/2019   • Venous insufficiency of both lower extremities    • VSD (ventricular septal defect and aortic arch hypoplasia        Social History     Tobacco Use   • Smoking status: Never Smoker   • Smokeless tobacco: Never Used   Vaping Use   • Vaping Use: Never used   Substance Use Topics   • Alcohol use: No   • Drug use: No       Current Outpatient Medications Ordered in Epic   Medication Sig Dispense Refill   • atorvastatin (LIPITOR) 10 MG Tab Take 1 Tablet by mouth every day. 100 Tablet 3   • lisinopril (PRINIVIL) 30 MG tablet Take 1 Tablet by mouth every day. 100 Tablet 3   • aspirin 81 MG EC tablet Take 81 mg by mouth every morning.       No current [facility]-ordered facility-administered medications on file.       Allergies:  Mustard [allyl isothiocyanate], Prochlorperazine edisylate [compazine], and Doxycycline    Health Maintenance: Deferred    ROS:  Gen: no fevers/chills, no changes in weight  Eyes: no changes in vision  ENT: no sore throat, no hearing loss, no bloody nose, +abnormal sounds in right ear  Pulm: no sob, no cough  CV: no chest pain, no palpitations  GI: no nausea/vomiting, no diarrhea  : no dysuria  MSk: no myalgias  Skin: no rash  Neuro: no headaches, no numbness/tingling  Heme/Lymph: no easy bruising      Objective:       Exam:  /70   Pulse 86   Temp 36.8 °C (98.2 °F) (Temporal)   Resp 18   Ht 1.588 m (5' 2.5\")   Wt 117 kg (258 lb)   SpO2 96%   BMI 46.44 kg/m²  Body mass index is 46.44 kg/m².    Gen: Alert and oriented, No apparent distress.  Neck: Neck is supple without lymphadenopathy.  No carotid bruits.  HEENT:  NCAT.  EOMI.  PERRLA.  Left ear with mild erythema of " the canal, normal TM.  Right ear is occluded with cerumen and this obstructs visualization.  Lungs: Normal effort, CTA bilaterally, no wheezes, rhonchi, or rales  CV: Regular rate and rhythm. No murmurs, rubs, or gallops.  Ext: No clubbing, cyanosis, edema.    Labs: None      Assessment & Plan:     72 y.o. female with the following -     Morbid obesity (HCC)  Chronic condition, stable.  Patient's weight is consistent.  He does get regular exercise, and watches her diet.  She also has bilateral knee pain, right greater than left.  This can limit her activity.  She will continue to work on diet and exercise.    Essential hypertension  Chronic condition, stable.  Patient's blood pressure in the office today is 110/70.  She takes lisinopril 30 mg daily.  She denies headache, shortness of breath, or chest pain.  She will continue her current regimen.    Prediabetes  Chronic condition, stable.  Patient is due for updated labs and these were ordered today.    Pure hypercholesterolemia  Chronic condition, stable.  Patient takes atorvastatin 10 mg daily and tolerates this without any adverse side effects.  Her last lipid profile was 3/18/2021 and revealed total cholesterol of 212, triglycerides of 171, HDL 42 and .  She is due for updated labs and these were ordered today.    Left knee DJD  Chronic condition, stable.  Patient received an injection into her left knee about 4 months ago with Dr. Soto.  She states that she did quite well with this injection, but it has started to wear off, and she would like to be referred back to Dr. Soto for further knee injection.  Referral was placed today.    Aortic atherosclerosis (HCC)  Chronic condition, stable.  Initially found on 3/26/21 CT of abdomen.    Her blood pressure is well controlled.  She is followed by cardiology and she is taking atorvastatin 10 mg daily.  We will continue to monitor her.    Abnormal sensation in right ear  Acute condition.  Patient states that  she has an odd sensation in her right ear, where she is hearing a type of crinkling noise.  She is also getting a little dizziness with this.  She denies hearing deficit out of the right side.  She does have a history of cerumen impaction.  On examination today, the left ear canal is slightly erythematous but otherwise normal TM.  The right ear does have cerumen occluding any visualization.  We will have patient's ears irrigated by the MA.  Due to limited staffing, and need to get patient to her 's appointment, irrigation was unable to be performed today.  Patient will schedule MA visit for this at her convenience.      Orders Placed This Encounter   • MA-SCREENING MAMMO BILAT W/CAD   • Tdap Vaccine =>6YO IM   • CBC WITH DIFFERENTIAL   • Comp Metabolic Panel   • HEMOGLOBIN A1C   • Lipid Profile   • VITAMIN D,25 HYDROXY   • TSH WITH REFLEX TO FT4   • Referral to GI for Colonoscopy   • Referral to Orthopedics   • atorvastatin (LIPITOR) 10 MG Tab   • lisinopril (PRINIVIL) 30 MG tablet          Return in about 6 months (around 9/28/2022).    I have placed the below orders and discussed them with an approved delegating provider.  The MA is performing the below orders under the direction of Kathleen Alcantar MD.    Please note that this dictation was created using voice recognition software. I have made every reasonable attempt to correct obvious errors, but I expect that there are errors of grammar and possibly content that I did not discover before finalizing the note.

## 2022-03-29 NOTE — ASSESSMENT & PLAN NOTE
Chronic condition, stable.  Patient takes atorvastatin 10 mg daily and tolerates this without any adverse side effects.  Her last lipid profile was 3/18/2021 and revealed total cholesterol of 212, triglycerides of 171, HDL 42 and .  She is due for updated labs and these were ordered today.

## 2022-03-29 NOTE — ASSESSMENT & PLAN NOTE
Chronic condition, stable.  Patient received an injection into her left knee about 4 months ago with Dr. Soto.  She states that she did quite well with this injection, but it has started to wear off, and she would like to be referred back to Dr. Soto for further knee injection.  Referral was placed today.

## 2022-03-29 NOTE — ASSESSMENT & PLAN NOTE
Chronic condition, stable.  Patient's blood pressure in the office today is 110/70.  She takes lisinopril 30 mg daily.  She denies headache, shortness of breath, or chest pain.  She will continue her current regimen.

## 2022-03-29 NOTE — ASSESSMENT & PLAN NOTE
Acute condition.  Patient states that she has an odd sensation in her right ear, where she is hearing a type of crinkling noise.  She is also getting a little dizziness with this.  She denies hearing deficit out of the right side.  She does have a history of cerumen impaction.  On examination today, the left ear canal is slightly erythematous but otherwise normal TM.  The right ear does have cerumen occluding any visualization.  We will have patient's ears irrigated by the MA.  Due to limited staffing, and need to get patient to her 's appointment, irrigation was unable to be performed today.  Patient will schedule MA visit for this at her convenience.

## 2022-03-29 NOTE — ASSESSMENT & PLAN NOTE
Chronic condition, stable.  Patient's weight is consistent.  He does get regular exercise, and watches her diet.  She also has bilateral knee pain, right greater than left.  This can limit her activity.  She will continue to work on diet and exercise.

## 2022-03-29 NOTE — ASSESSMENT & PLAN NOTE
Chronic condition, stable.  Initially found on 3/26/21 CT of abdomen.    Her blood pressure is well controlled.  She is followed by cardiology and she is taking atorvastatin 10 mg daily.  We will continue to monitor her.

## 2022-04-04 ENCOUNTER — HOSPITAL ENCOUNTER (OUTPATIENT)
Dept: LAB | Facility: MEDICAL CENTER | Age: 72
End: 2022-04-04
Attending: NURSE PRACTITIONER
Payer: MEDICARE

## 2022-04-04 DIAGNOSIS — R79.89 LOW VITAMIN D LEVEL: ICD-10-CM

## 2022-04-04 DIAGNOSIS — Z79.899 HIGH RISK MEDICATION USE: ICD-10-CM

## 2022-04-04 DIAGNOSIS — E78.00 PURE HYPERCHOLESTEROLEMIA: ICD-10-CM

## 2022-04-04 DIAGNOSIS — Z13.29 SCREENING FOR ENDOCRINE DISORDER: ICD-10-CM

## 2022-04-04 DIAGNOSIS — R73.03 PREDIABETES: ICD-10-CM

## 2022-04-04 DIAGNOSIS — E66.01 MORBID OBESITY (HCC): ICD-10-CM

## 2022-04-04 LAB
25(OH)D3 SERPL-MCNC: 16 NG/ML (ref 30–100)
ALBUMIN SERPL BCP-MCNC: 3.9 G/DL (ref 3.2–4.9)
ALBUMIN/GLOB SERPL: 1.2 G/DL
ALP SERPL-CCNC: 82 U/L (ref 30–99)
ALT SERPL-CCNC: 5 U/L (ref 2–50)
ANION GAP SERPL CALC-SCNC: 12 MMOL/L (ref 7–16)
AST SERPL-CCNC: 9 U/L (ref 12–45)
BASOPHILS # BLD AUTO: 0.8 % (ref 0–1.8)
BASOPHILS # BLD: 0.05 K/UL (ref 0–0.12)
BILIRUB SERPL-MCNC: 0.5 MG/DL (ref 0.1–1.5)
BUN SERPL-MCNC: 23 MG/DL (ref 8–22)
CALCIUM SERPL-MCNC: 9.6 MG/DL (ref 8.5–10.5)
CHLORIDE SERPL-SCNC: 105 MMOL/L (ref 96–112)
CHOLEST SERPL-MCNC: 137 MG/DL (ref 100–199)
CO2 SERPL-SCNC: 24 MMOL/L (ref 20–33)
CREAT SERPL-MCNC: 0.99 MG/DL (ref 0.5–1.4)
EOSINOPHIL # BLD AUTO: 0.26 K/UL (ref 0–0.51)
EOSINOPHIL NFR BLD: 3.9 % (ref 0–6.9)
ERYTHROCYTE [DISTWIDTH] IN BLOOD BY AUTOMATED COUNT: 50.2 FL (ref 35.9–50)
FASTING STATUS PATIENT QL REPORTED: NORMAL
GFR SERPLBLD CREATININE-BSD FMLA CKD-EPI: 61 ML/MIN/1.73 M 2
GLOBULIN SER CALC-MCNC: 3.2 G/DL (ref 1.9–3.5)
GLUCOSE SERPL-MCNC: 95 MG/DL (ref 65–99)
HCT VFR BLD AUTO: 40.2 % (ref 37–47)
HDLC SERPL-MCNC: 50 MG/DL
HGB BLD-MCNC: 12.6 G/DL (ref 12–16)
IMM GRANULOCYTES # BLD AUTO: 0.01 K/UL (ref 0–0.11)
IMM GRANULOCYTES NFR BLD AUTO: 0.2 % (ref 0–0.9)
LDLC SERPL CALC-MCNC: 70 MG/DL
LYMPHOCYTES # BLD AUTO: 2.49 K/UL (ref 1–4.8)
LYMPHOCYTES NFR BLD: 37.7 % (ref 22–41)
MCH RBC QN AUTO: 29.6 PG (ref 27–33)
MCHC RBC AUTO-ENTMCNC: 31.3 G/DL (ref 33.6–35)
MCV RBC AUTO: 94.4 FL (ref 81.4–97.8)
MONOCYTES # BLD AUTO: 0.63 K/UL (ref 0–0.85)
MONOCYTES NFR BLD AUTO: 9.5 % (ref 0–13.4)
NEUTROPHILS # BLD AUTO: 3.16 K/UL (ref 2–7.15)
NEUTROPHILS NFR BLD: 47.9 % (ref 44–72)
NRBC # BLD AUTO: 0 K/UL
NRBC BLD-RTO: 0 /100 WBC
PLATELET # BLD AUTO: 239 K/UL (ref 164–446)
PMV BLD AUTO: 10 FL (ref 9–12.9)
POTASSIUM SERPL-SCNC: 4.6 MMOL/L (ref 3.6–5.5)
PROT SERPL-MCNC: 7.1 G/DL (ref 6–8.2)
RBC # BLD AUTO: 4.26 M/UL (ref 4.2–5.4)
SODIUM SERPL-SCNC: 141 MMOL/L (ref 135–145)
TRIGL SERPL-MCNC: 85 MG/DL (ref 0–149)
TSH SERPL DL<=0.005 MIU/L-ACNC: 2.01 UIU/ML (ref 0.38–5.33)
WBC # BLD AUTO: 6.6 K/UL (ref 4.8–10.8)

## 2022-04-04 PROCEDURE — 80061 LIPID PANEL: CPT

## 2022-04-04 PROCEDURE — 82306 VITAMIN D 25 HYDROXY: CPT

## 2022-04-04 PROCEDURE — 85025 COMPLETE CBC W/AUTO DIFF WBC: CPT

## 2022-04-04 PROCEDURE — 80053 COMPREHEN METABOLIC PANEL: CPT

## 2022-04-04 PROCEDURE — 83036 HEMOGLOBIN GLYCOSYLATED A1C: CPT

## 2022-04-04 PROCEDURE — 84443 ASSAY THYROID STIM HORMONE: CPT

## 2022-04-04 PROCEDURE — 36415 COLL VENOUS BLD VENIPUNCTURE: CPT

## 2022-04-05 LAB
EST. AVERAGE GLUCOSE BLD GHB EST-MCNC: 114 MG/DL
HBA1C MFR BLD: 5.6 % (ref 4–5.6)

## 2022-04-07 DIAGNOSIS — R79.89 LOW VITAMIN D LEVEL: ICD-10-CM

## 2022-04-07 RX ORDER — ERGOCALCIFEROL 1.25 MG/1
50000 CAPSULE ORAL
Qty: 12 CAPSULE | Refills: 0 | Status: SHIPPED | OUTPATIENT
Start: 2022-04-07 | End: 2022-09-22 | Stop reason: SDUPTHER

## 2022-04-07 NOTE — RESULT ENCOUNTER NOTE
Please call Fior and let her know that all of her labs were within normal range.  Her cholesterol has improved significantly and all values are within her targeted range now.  She does have a low vitamin D and I have sent in a prescription for her to take weekly to help bring this level up.   If she would like a copy of her labs, please mail her one.     Let me know if she has any questions,  Velia

## 2022-09-22 ENCOUNTER — OFFICE VISIT (OUTPATIENT)
Dept: MEDICAL GROUP | Facility: PHYSICIAN GROUP | Age: 72
End: 2022-09-22
Payer: MEDICARE

## 2022-09-22 VITALS
BODY MASS INDEX: 43.48 KG/M2 | RESPIRATION RATE: 12 BRPM | TEMPERATURE: 97.9 F | OXYGEN SATURATION: 95 % | SYSTOLIC BLOOD PRESSURE: 100 MMHG | HEART RATE: 78 BPM | DIASTOLIC BLOOD PRESSURE: 64 MMHG | WEIGHT: 245.38 LBS | HEIGHT: 63 IN

## 2022-09-22 DIAGNOSIS — R79.89 LOW VITAMIN D LEVEL: ICD-10-CM

## 2022-09-22 DIAGNOSIS — I10 ESSENTIAL HYPERTENSION: Primary | ICD-10-CM

## 2022-09-22 DIAGNOSIS — J01.10 ACUTE NON-RECURRENT FRONTAL SINUSITIS: ICD-10-CM

## 2022-09-22 DIAGNOSIS — E78.00 PURE HYPERCHOLESTEROLEMIA: ICD-10-CM

## 2022-09-22 PROBLEM — K43.9 VENTRAL HERNIA WITHOUT OBSTRUCTION OR GANGRENE: Status: RESOLVED | Noted: 2019-09-17 | Resolved: 2022-09-22

## 2022-09-22 PROBLEM — R60.0 LOWER EXTREMITY EDEMA: Status: RESOLVED | Noted: 2019-01-21 | Resolved: 2022-09-22

## 2022-09-22 PROCEDURE — 99214 OFFICE O/P EST MOD 30 MIN: CPT | Performed by: NURSE PRACTITIONER

## 2022-09-22 RX ORDER — AZITHROMYCIN 250 MG/1
TABLET, FILM COATED ORAL
Qty: 6 TABLET | Refills: 0 | Status: SHIPPED | OUTPATIENT
Start: 2022-09-22 | End: 2022-11-02

## 2022-09-22 RX ORDER — LISINOPRIL 30 MG/1
30 TABLET ORAL DAILY
Qty: 100 TABLET | Refills: 3 | Status: SHIPPED | OUTPATIENT
Start: 2022-09-22 | End: 2023-11-16 | Stop reason: SDUPTHER

## 2022-09-22 RX ORDER — ATORVASTATIN CALCIUM 10 MG/1
10 TABLET, FILM COATED ORAL DAILY
Qty: 100 TABLET | Refills: 3 | Status: SHIPPED | OUTPATIENT
Start: 2022-09-22 | End: 2023-11-16 | Stop reason: SDUPTHER

## 2022-09-22 RX ORDER — ERGOCALCIFEROL 1.25 MG/1
50000 CAPSULE ORAL
Qty: 12 CAPSULE | Refills: 0 | Status: SHIPPED | OUTPATIENT
Start: 2022-09-22

## 2022-09-22 ASSESSMENT — FIBROSIS 4 INDEX: FIB4 SCORE: 1.21

## 2022-09-22 NOTE — ASSESSMENT & PLAN NOTE
Chronic condition, stable.  Patient takes lisinopril 30 mg daily.  Her BP is well controlled at 100/64 in the office today. Continue current regimen.

## 2022-09-22 NOTE — ASSESSMENT & PLAN NOTE
Chronic condition, unstable.  Patient has had a critically low vitamin D trend for the past two years. She does not take supplemental vitamin D at home as prior provider preferred the prescription strength.  Patient was provided a refill today.  She will continue her current regimen.

## 2022-09-22 NOTE — PROGRESS NOTES
Subjective:     CC: The primary encounter diagnosis was Essential hypertension. Diagnoses of Pure hypercholesterolemia, Low vitamin D level, and Acute non-recurrent frontal sinusitis were also pertinent to this visit.      HPI:   Fior is an established patient of [facility] here for follow-up.  We discussed the following problems:    1. Essential hypertension  Chronic condition, stable. Patient is here for evaluation today.    2. Pure hypercholesterolemia  Chronic condition, stable. Patient is here for evaluation today.    3. Low vitamin D level  Chronic condition, unstable. Patient is here for evaluation today.    4. Acute non-recurrent frontal sinusitis  Acute condition, unstable. Patient is here for evaluation today.        Past Medical History:   Diagnosis Date    Cancer ([facility]) 2012    skin    Dental disorder 09/11/2019    upper denture    Diverticulitis     Hypertension     Morbid obesity ([facility])     Obesity     TOMI (obstructive sleep apnea)     PE (pulmonary thromboembolism) ([facility])     Urinary bladder disorder     Urinary incontinence 09/11/2019    Venous insufficiency of both lower extremities     VSD (ventricular septal defect and aortic arch hypoplasia        Social History     Tobacco Use    Smoking status: Never    Smokeless tobacco: Never   Vaping Use    Vaping Use: Never used   Substance Use Topics    Alcohol use: No    Drug use: No       Current Outpatient Medications Ordered in Epic   Medication Sig Dispense Refill    atorvastatin (LIPITOR) 10 MG Tab Take 1 Tablet by mouth every day. 100 Tablet 3    lisinopril (PRINIVIL) 30 MG tablet Take 1 Tablet by mouth every day. 100 Tablet 3    vitamin D2, Ergocalciferol, (DRISDOL) 1.25 MG (39957 UT) Cap capsule Take 1 Capsule by mouth every 7 days. 12 Capsule 0    azithromycin (ZITHROMAX) 250 MG Tab Take two tablets on the first day, then one tablet daily for 4 days. 6 Tablet 0    aspirin 81 MG EC tablet Take 81 mg by mouth every morning.       No current Epic-ordered  "facility-administered medications on file.       Allergies:  Mustard [allyl isothiocyanate], Prochlorperazine edisylate [compazine], and Doxycycline    Health Maintenance: Vaccines deferred until patient feels well.     ROS:  Gen: no fevers/chills, no changes in weight  Eyes: no changes in vision  ENT: no sore throat, no hearing loss, no bloody nose  Pulm: no sob, no cough  CV: no chest pain, no palpitations  GI: no nausea/vomiting, no diarrhea  : no dysuria  MSk: no myalgias  Skin: no rash  Neuro: no headaches, no numbness/tingling  Heme/Lymph: no easy bruising      Objective:       Exam:  /64 (BP Location: Left arm, Patient Position: Sitting, BP Cuff Size: Large adult)   Pulse 78   Temp 36.6 °C (97.9 °F) (Temporal)   Resp 12   Ht 1.588 m (5' 2.5\")   Wt 111 kg (245 lb 6 oz)   SpO2 95%   Breastfeeding No   BMI 44.16 kg/m²  Body mass index is 44.16 kg/m².    Gen: Alert and oriented, No apparent distress.  HEENT:  Pain to palpation over the frontal sinuses.    Neck: Neck is supple without lymphadenopathy.  No carotid bruits.  Lungs: Normal effort, CTA bilaterally, no wheezes, rhonchi, or rales  CV: Regular rate and rhythm. No murmurs, rubs, or gallops.  Ext: No clubbing, cyanosis, edema.    Labs: Dated: None    Assessment & Plan:     72 y.o. female with the following -     Essential hypertension  Chronic condition, stable.  Patient takes lisinopril 30 mg daily.  Her BP is well controlled at 100/64 in the office today. Continue current regimen.      Pure hypercholesterolemia  Chronic condition, stable.  Patient takes atorvastatin 10 mg daily.  She tolerates this without side-effect.  Continue current regimen.      Low vitamin D level  Chronic condition, unstable.  Patient has had a critically low vitamin D trend for the past two years. She does not take supplemental vitamin D at home as prior provider preferred the prescription strength.  Patient was provided a refill today.  She will continue her " current regimen.     Acute non-recurrent frontal sinusitis  Acute condition, unstable. Patient reports acute onset of sinus pressure and drainage.  She reports pain in the frontal sinuses only. She denies fever or chills.  She states she does not have fever or chills with any sinus episodes. She states that she gets sinus infections regularly and that her prior provider just called in antibiotics for her.  She has had her symptoms for 24 hours. Discussed with patient appropriate use of antibiotics.  I will send in a z-jennifer for her today, but she will wait and pick this up if she is still symptomatic after at least five more days.  Patient verbalizes understanding.     Orders Placed This Encounter    atorvastatin (LIPITOR) 10 MG Tab    lisinopril (PRINIVIL) 30 MG tablet    vitamin D2, Ergocalciferol, (DRISDOL) 1.25 MG (94553 UT) Cap capsule    azithromycin (ZITHROMAX) 250 MG Tab          Return in about 6 months (around 3/22/2023).    I have placed the below orders and discussed them with an approved delegating provider.  The MA is performing the below orders under the direction of Kathleen Alcantar MD.    Please note that this dictation was created using voice recognition software. I have made every reasonable attempt to correct obvious errors, but I expect that there are errors of grammar and possibly content that I did not discover before finalizing the note.

## 2022-09-22 NOTE — ASSESSMENT & PLAN NOTE
Chronic condition, stable.  Patient takes atorvastatin 10 mg daily.  She tolerates this without side-effect.  Continue current regimen.

## 2022-09-22 NOTE — ASSESSMENT & PLAN NOTE
Acute condition, unstable. Patient reports acute onset of sinus pressure and drainage.  She reports pain in the frontal sinuses only. She denies fever or chills.  She states she does not have fever or chills with any sinus episodes. She states that she gets sinus infections regularly and that her prior provider just called in antibiotics for her.  She has had her symptoms for 24 hours. Discussed with patient appropriate use of antibiotics.  I will send in a z-jennifer for her today, but she will wait and pick this up if she is still symptomatic after at least five more days.  Patient verbalizes understanding.

## 2022-10-09 NOTE — ADDENDUM NOTE
Encounter addended by: Mario Whitlock M.D. on: 10/9/2022 7:24 AM   Actions taken: Delete clinical note

## 2022-10-21 ENCOUNTER — TELEPHONE (OUTPATIENT)
Dept: SCHEDULING | Facility: IMAGING CENTER | Age: 72
End: 2022-10-21

## 2022-11-02 ENCOUNTER — OFFICE VISIT (OUTPATIENT)
Dept: MEDICAL GROUP | Facility: PHYSICIAN GROUP | Age: 72
End: 2022-11-02
Payer: MEDICARE

## 2022-11-02 VITALS
HEIGHT: 62 IN | RESPIRATION RATE: 16 BRPM | SYSTOLIC BLOOD PRESSURE: 122 MMHG | BODY MASS INDEX: 46.01 KG/M2 | HEART RATE: 68 BPM | WEIGHT: 250 LBS | OXYGEN SATURATION: 97 % | TEMPERATURE: 98.7 F | DIASTOLIC BLOOD PRESSURE: 68 MMHG

## 2022-11-02 DIAGNOSIS — E78.00 PURE HYPERCHOLESTEROLEMIA: Chronic | ICD-10-CM

## 2022-11-02 DIAGNOSIS — R79.89 LOW VITAMIN D LEVEL: Chronic | ICD-10-CM

## 2022-11-02 DIAGNOSIS — G47.33 OSA (OBSTRUCTIVE SLEEP APNEA): ICD-10-CM

## 2022-11-02 DIAGNOSIS — Z23 NEED FOR VACCINATION: ICD-10-CM

## 2022-11-02 DIAGNOSIS — I10 ESSENTIAL HYPERTENSION: Chronic | ICD-10-CM

## 2022-11-02 PROCEDURE — 90662 IIV NO PRSV INCREASED AG IM: CPT

## 2022-11-02 PROCEDURE — G0008 ADMIN INFLUENZA VIRUS VAC: HCPCS

## 2022-11-02 PROCEDURE — 99214 OFFICE O/P EST MOD 30 MIN: CPT | Mod: 25

## 2022-11-02 ASSESSMENT — FIBROSIS 4 INDEX: FIB4 SCORE: 1.21

## 2022-11-02 NOTE — PROGRESS NOTES
CC:   Chief Complaint   Patient presents with    [facility]        HISTORY OF PRESENT ILLNESS: Patient is a 72 y.o. female established patient who p is resents today to discuss the following problems below:     Essential hypertension  Patient is currently on lisinopril 30 mg daily, blood pressure in office today is 122/68.  She reports that her blood pressure is well controlled at home.  Overall she feels well, no dizziness, headaches or shortness of breath.      Low vitamin D level  Patient is currently on a prescription replacement for vitamin D.  She continues to take this.     VSD (ventricular septal defect)  Patient is currently established with a cardiologist.  Blood pressure well managed.     TOMI (obstructive sleep apnea)  Patient reports that she snores, but sleeps well and does not feel tired upon waking.  She has not worn a CPAP device and declines this today.      Pure hypercholesterolemia  Patient is currently on atorvastatin 10 mg daily.    Lab Results   Component Value Date/Time    CHOLSTRLTOT 137 04/04/2022 08:08 AM    TRIGLYCERIDE 85 04/04/2022 08:08 AM    HDL 50 04/04/2022 08:08 AM    LDL 70 04/04/2022 08:08 AM   ]  Lipids are well controlled at this time.      Past Medical History:   Diagnosis Date    Cancer ([facility]) 2012    skin    Dental disorder 09/11/2019    upper denture    Diverticulitis     Hypertension     Morbid obesity ([facility])     Obesity     TOMI (obstructive sleep apnea)     PE (pulmonary thromboembolism) ([facility])     Urinary bladder disorder     Urinary incontinence 09/11/2019    Venous insufficiency of both lower extremities     VSD (ventricular septal defect and aortic arch hypoplasia        Allergies:Mustard [allyl isothiocyanate], Prochlorperazine edisylate [compazine], and Doxycycline    Review of Systems: Otherwise negative except for as stated above.      Exam: /68 (BP Location: Right arm, Patient Position: Sitting, BP Cuff Size: Large adult)   Pulse 68   Temp 37.1 °C (98.7  "°F) (Temporal)   Resp 16   Ht 1.575 m (5' 2\")   Wt 113 kg (250 lb)   SpO2 97%  Body mass index is 45.73 kg/m².    Gen: Alert and oriented x4. Well developed, well-nourished female in no apparent distress.  Skin: Warm, dry, good turgor, no rashes in visible areas or lacerations appreciated.   Eye: EOM intact, pupils equal, round and reactive, conjunctiva clear, lids normal.  Neck: Trachea midline, no masses, no thyromegaly  Lungs: Normal effort, CTA bilaterally, no wheezes, rhonchi, or rales. No stridor or audible wheezing. Equal chest expansion.   CV: Regular rate and rhythm. No murmurs, rubs, or gallops.  GI:  Soft, non-tender abdomen with no distention.   MSK: Normal gait, moves all extremities. Walker present  Neuro: Alert and oriented x 4, non-focal exam with motor and sensory grossly intact.  Ext: No clubbing, cyanosis, edema.  Psych: Normal behavior, affect and mood.      Assessment/Plan:  72 y.o. female with the following -    1. Pure hypercholesterolemia  Chronic condition, stable.  Continue atorvastatin 10 mg daily.  Repeat labs in April 2022    2. Essential hypertension  Chronic condition, stable.  Continue lisinopril 30 mg daily.  Blood pressure well controlled in office today.  No adverse side effects.  No refills needed today    3. Low vitamin D level  Chronic condition stable.  Currently on vitamin D replacement    4. TOMI (obstructive sleep apnea)  Chronic condition.  Not currently on a CPAP device.  Discussed with patient risks of untreated sleep apnea, pulmonary hypertension, right atrial strain.  Patient declines    5. Need for vaccination  - INFLUENZA VACCINE, HIGH DOSE (65+ ONLY)        Follow-up: Return in about 4 months (around 3/2/2023) for annual wellness .    Health Maintenance: Completed.  Given phone number for mammogram scheduling today      Please note that this dictation was created using voice recognition software. I have made every reasonable attempt to correct obvious errors, but " I expect that there are errors of grammar and possibly content that I did not discover before finalizing the note.    Electronically signed by JANETT Beach on November 2, 2022

## 2022-11-02 NOTE — ASSESSMENT & PLAN NOTE
Patient is currently on lisinopril 30 mg daily, blood pressure in office today is 122/68.  She reports that her blood pressure is well controlled at home.  Overall she feels well, no dizziness, headaches or shortness of breath.

## 2022-11-02 NOTE — ASSESSMENT & PLAN NOTE
Patient is currently on atorvastatin 10 mg daily.    Lab Results   Component Value Date/Time    CHOLSTRLTOT 137 04/04/2022 08:08 AM    TRIGLYCERIDE 85 04/04/2022 08:08 AM    HDL 50 04/04/2022 08:08 AM    LDL 70 04/04/2022 08:08 AM   ]  Lipids are well controlled at this time.

## 2022-11-02 NOTE — ASSESSMENT & PLAN NOTE
Patient reports that she snores, but sleeps well and does not feel tired upon waking.  She has not worn a CPAP device and declines this today.

## 2023-03-08 ENCOUNTER — OFFICE VISIT (OUTPATIENT)
Dept: MEDICAL GROUP | Facility: PHYSICIAN GROUP | Age: 73
End: 2023-03-08
Payer: MEDICARE

## 2023-03-08 VITALS
HEART RATE: 71 BPM | HEIGHT: 62 IN | SYSTOLIC BLOOD PRESSURE: 118 MMHG | RESPIRATION RATE: 18 BRPM | TEMPERATURE: 98.5 F | WEIGHT: 259 LBS | OXYGEN SATURATION: 94 % | DIASTOLIC BLOOD PRESSURE: 60 MMHG | BODY MASS INDEX: 47.66 KG/M2

## 2023-03-08 DIAGNOSIS — M81.0 AGE-RELATED OSTEOPOROSIS WITHOUT CURRENT PATHOLOGICAL FRACTURE: ICD-10-CM

## 2023-03-08 DIAGNOSIS — I10 ESSENTIAL HYPERTENSION: Chronic | ICD-10-CM

## 2023-03-08 DIAGNOSIS — E66.01 MORBID OBESITY (HCC): Chronic | ICD-10-CM

## 2023-03-08 DIAGNOSIS — Z12.31 ENCOUNTER FOR SCREENING MAMMOGRAM FOR MALIGNANT NEOPLASM OF BREAST: ICD-10-CM

## 2023-03-08 DIAGNOSIS — I70.0 AORTIC ATHEROSCLEROSIS (HCC): Chronic | ICD-10-CM

## 2023-03-08 DIAGNOSIS — Z78.0 POST-MENOPAUSAL: ICD-10-CM

## 2023-03-08 DIAGNOSIS — Z00.00 ENCOUNTER FOR MEDICARE ANNUAL WELLNESS EXAM: ICD-10-CM

## 2023-03-08 PROBLEM — Z95.828 PRESENCE OF INFERIOR VENA CAVA FILTER: Chronic | Status: ACTIVE | Noted: 2019-01-21

## 2023-03-08 PROCEDURE — G0439 PPPS, SUBSEQ VISIT: HCPCS

## 2023-03-08 ASSESSMENT — ENCOUNTER SYMPTOMS: GENERAL WELL-BEING: GOOD

## 2023-03-08 ASSESSMENT — ACTIVITIES OF DAILY LIVING (ADL): BATHING_REQUIRES_ASSISTANCE: 0

## 2023-03-08 ASSESSMENT — FIBROSIS 4 INDEX: FIB4 SCORE: 1.21

## 2023-03-08 ASSESSMENT — PATIENT HEALTH QUESTIONNAIRE - PHQ9: CLINICAL INTERPRETATION OF PHQ2 SCORE: 0

## 2023-03-08 NOTE — ASSESSMENT & PLAN NOTE
Patient continues to work on diet and exercise.  Her weight has remained the same since last year. She admits to not exercising as much, particularly due to cold. She denies any falls.

## 2023-03-08 NOTE — PROGRESS NOTES
Chief Complaint   Patient presents with    Annual Wellness Visit       HPI:  Fior Ray is a 72 y.o. here for Medicare Annual Wellness Visit     Patient Active Problem List    Diagnosis Date Noted    BMI 40.0-44.9, adult (HCC) 03/10/2023    Irregular heart rate 03/10/2023    Low vitamin D level 09/22/2022    Acute non-recurrent frontal sinusitis 09/22/2022    Aortic atherosclerosis (HCC) 03/29/2022    Abnormal sensation in right ear 03/29/2022    Left knee DJD 03/28/2022    Pure hypercholesterolemia 05/10/2019    Venous insufficiency of both lower extremities     Prediabetes 01/23/2019    History of diverticulitis 01/21/2019    History of recurrent deep vein thrombosis (DVT) 01/21/2019    Presence of inferior vena cava filter 01/21/2019    History of basal cell cancer 01/21/2019    TOMI (obstructive sleep apnea) 10/14/2018    History of pulmonary embolism 10/12/2018    Essential hypertension 10/12/2018    VSD (ventricular septal defect) 10/12/2018    Morbid obesity (HCC)        Current Outpatient Medications   Medication Sig Dispense Refill    atorvastatin (LIPITOR) 10 MG Tab Take 1 Tablet by mouth every day. 100 Tablet 3    lisinopril (PRINIVIL) 30 MG tablet Take 1 Tablet by mouth every day. 100 Tablet 3    vitamin D2, Ergocalciferol, (DRISDOL) 1.25 MG (44722 UT) Cap capsule Take 1 Capsule by mouth every 7 days. 12 Capsule 0    aspirin 81 MG EC tablet Take 81 mg by mouth every morning.       No current facility-administered medications for this visit.          Current supplements as per medication list.     Allergies: Mustard [allyl isothiocyanate], Prochlorperazine edisylate [compazine], and Doxycycline    Current social contact/activities: Goes out to dinner and sees friends, very close to family      She  reports that she has never smoked. She has never used smokeless tobacco. She reports that she does not drink alcohol and does not use drugs.  Counseling given: Not Answered      ROS:    Gait: Uses no  assistive device  Ostomy: No  Other tubes: No  Amputations: No  Chronic oxygen use: No  Last eye exam: 2020  Wears hearing aids: No   : Reports urinary leakage during the last 6 months that has not interfered at all with their daily activities or sleep.    Screening:    Depression Screening  Little interest or pleasure in doing things?  0 - not at all  Feeling down, depressed , or hopeless? 0 - not at all  Patient Health Questionnaire Score: 0     If depressive symptoms identified deferred to follow up visit unless specifically addressed in assessment and plan.    Interpretation of PHQ-9 Total Score   Score Severity   1-4 No Depression   5-9 Mild Depression   10-14 Moderate Depression   15-19 Moderately Severe Depression   20-27 Severe Depression    Screening for Cognitive Impairment  Three Minute Recall (daughter, heaven, mountain) 3/3    [place] clock face with all 12 numbers and set the hands to show 10 past 11.  Yes    Cognitive concerns identified deferred for follow up unless specifically addressed in assessment and plan.    Fall Risk Assessment  Has the patient had two or more falls in the last year or any fall with injury in the last year?  No    Safety Assessment  Throw rugs on floor.  No  Handrails on all stairs.  Yes  Good lighting in all hallways.  Yes  Difficulty hearing.  No  Patient counseled about all safety risks that were identified.    Functional Assessment ADLs  Are there any barriers preventing you from cooking for yourself or meeting nutritional needs?  No.    Are there any barriers preventing you from driving safely or obtaining transportation?  No.    Are there any barriers preventing you from using a telephone or calling for help?  No.    Are there any barriers preventing you from shopping?  No.    Are there any barriers preventing you from taking care of your own finances?  No.    Are there any barriers preventing you from managing your medications?  No.    Are there any barriers preventing  you from showering, bathing or dressing yourself?  No.    Are you currently engaging in any exercise or physical activity?  Yes.  Pt walk everyday   What is your perception of your health?  Good    Advance Care Planning  Do you have an Advance Directive, Living Will, Durable Power of , or POLST? No                 Health Maintenance Summary            Ordered - BONE DENSITY (Every 5 Years) Ordered on 3/8/2023      09/22/2005  DS-BONE DENSITY STUDY (DEXA)              Overdue - COVID-19 Vaccine (3 - Booster for Pfizer series) Overdue since 4/9/2021 02/12/2021  Imm Admin: PFIZER PURPLE CAP SARS-COV-2 VACCINATION (12+)    01/22/2021  Imm Admin: PFIZER PURPLE CAP SARS-COV-2 VACCINATION (12+)              Scheduled - MAMMOGRAM (Every 6 Months) Scheduled for 3/14/2023      01/25/2021  MA-DIAGNOSTIC MAMMO RIGHT W/TOMOSYNTHESIS W/CAD    06/15/2020  MA-DIAGNOSTIC MAMMO BILAT W/TOMOSYNTHESIS W/O CAD    09/03/2019  MA-MAMMO DIAGNOSTIC UNILAT W/CELENA W/CAD RIGHT    02/05/2019  MA-DIAGNOSTIC DIGITAL MAMMO-UNILAT RIGHT    01/23/2019  MA-SCREENING MAMMO BILAT W/TOMOSYNTHESIS W/CAD    Only the first 5 history entries have been loaded, but more history exists.              Ordered - COLORECTAL CANCER SCREENING (COLOGUARD STOOL DNA - Every 3 Years) Ordered on 3/28/2022      09/20/2020  COLOGUARD STOOL DNA (Done)              Annual Wellness Visit (Every 366 Days) Next due on 3/10/2024      03/10/2023  Level of Service: ANNUAL WELLNESS VISIT-INCLUDES PPPS SUBSEQUE*    03/08/2023  Visit Dx: Encounter for Medicare annual wellness exam    08/26/2021  Level of Service: MD ANNUAL WELLNESS VISIT-INCLUDES PPPS SUBSEQUE*    03/08/2021  Level of Service: MD ANNUAL WELLNESS VISIT-INCLUDES PPPS SUBSEQUE*    03/08/2021  Visit Dx: Medicare annual wellness visit, subsequent    Only the first 5 history entries have been loaded, but more history exists.              IMM DTaP/Tdap/Td Vaccine (2 - Td or Tdap) Next due on 3/28/2032       2022  Imm Admin: Tdap Vaccine              IMM PNEUMOCOCCAL VACCINE: 65+ Years (Series Information) Completed      2018  Imm Admin: Pneumococcal polysaccharide vaccine (PPSV-23)    2017  Imm Admin: Pneumococcal Conjugate Vaccine (Prevnar/PCV-13)              HEPATITIS C SCREENING  Completed      2020  HCV Scrn ( [id number] 1xLife)              IMM ZOSTER VACCINES (Series Information) Completed      2021  Imm Admin: Zoster Vaccine Recombinant (RZV) (SHINGRIX)    10/28/2020  Imm Admin: Zoster Vaccine Recombinant (RZV) (SHINGRIX)              IMM INFLUENZA (Series Information) Completed      2022  Imm Admin: Influenza Vaccine Adult HD    10/12/2021  Imm Admin: Influenza Vaccine Adult HD    10/06/2020  Imm Admin: Influenza Vaccine Adult HD    10/07/2019  Imm Admin: Influenza Vaccine Adult HD    2019  Imm Admin: Influenza Vaccine Adult HD    Only the first 5 history entries have been loaded, but more history exists.              IMM MENINGOCOCCAL ACWY VACCINE (Series Information) Aged Out      No completion history exists for this topic.              Discontinued - IMM HEP B VACCINE  Discontinued      No completion history exists for this topic.                    Patient Care Team:  ANY Nguyen as PCP - General (Nurse Practitioner Family)  ANY Nguyen as PCP - H Paneled (Nurse Practitioner Family)  Vein [place]  as Consulting Physician (Vascular Surgery)  Mame Velasco C.N.A. (Inactive) as [facility]     Social History     Tobacco Use    Smoking status: Never    Smokeless tobacco: Never   Vaping Use    Vaping Use: Never used   Substance Use Topics    Alcohol use: No    Drug use: No     Family History   Problem Relation Age of Onset    Lung Disease Mother         COPD     Heart Attack Father     No Known Problems Sister     No Known Problems Brother     No Known Problems Maternal Grandmother     No Known Problems  "Maternal Grandfather     No Known Problems Paternal Grandmother     Heart Attack Paternal Grandfather     Heart Disease Daughter     No Known Problems Daughter     Heart Disease Son      She  has a past medical history of Cancer (HCC) (2012), Dental disorder (09/11/2019), Diverticulitis, Hypertension, Morbid obesity (HCC), Obesity, TOMI (obstructive sleep apnea), PE (pulmonary thromboembolism) (HCC), Urinary bladder disorder, Urinary incontinence (09/11/2019), Venous insufficiency of both lower extremities, and VSD (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    She has no past medical history of Diabetes (HCC) or Renal disorder.   Past Surgical History:   Procedure Laterality Date    UMBILICAL HERNIA REPAIR  9/17/2019    Procedure: REPAIR, HERNIA, UMBILICAL- INCARCERATED;  Surgeon: Truong Daniels M.D.;  Location: SURGERY SAME DAY [facility];  Service: General    KNEE ARTHROPLASTY TOTAL Right 2014    HYSTERECTOMY, VAGINAL      VEIN STRIPPING         Exam:   /60   Pulse 71   Temp 36.9 °C (98.5 °F) (Temporal)   Resp 18   Ht 1.575 m (5' 2\")   Wt 117 kg (259 lb)   SpO2 94%  Body mass index is 47.37 kg/m².    Hearing good.    Dentition upper dentures  Alert, oriented in no acute distress.  Eye contact is good, speech goal directed, affect calm    Assessment and Plan. The following treatment and monitoring plan is recommended:      1. Encounter for Medicare annual wellness exam      2. Morbid obesity (HCC)  Chronic, stable.  Encouraged increased activity and management of diet for weight loss.    3. Aortic atherosclerosis (HCC)  Chronic, stable.  Continue atorvastatin 10 mg daily.  Due for updated lipids  - Lipid Profile; Future    4. Essential hypertension  Chronic, stable.  Continue lisinopril 30 mg daily.  Blood pressure well controlled at 118/60 today.  Due for updated labs  - Comp Metabolic Panel; Future  - CBC WITHOUT DIFFERENTIAL; Future  - TSH WITH REFLEX TO FT4; Future    5. Age-related " osteoporosis without current pathological fracture  6. Post-menopausal  - DS-BONE DENSITY STUDY (DEXA); Future    7. Encounter for screening mammogram for malignant neoplasm of breast  - MA-SCREENING MAMMO BILAT W/TOMOSYNTHESIS W/O CAD; Future        Services suggested:   Health Care Screening: No services needed at this timeropriate preventive services recommended by USPTF and ACIP covered by Medicare were discussed today. Services ordered if indicated and agreed upon by the patient.  Referrals offered: Community-based lifestyle interventions to reduce health risks and promote self-management and wellness, fall prevention, nutrition, physical activity, tobacco-use cessation, weight loss, and mental health services as per orders if indicated.    Discussion today about general wellness and lifestyle habits:    Prevent falls and reduce trip hazards; Cautioned about securing or removing rugs.  Have a working fire alarm and carbon monoxide detector;   Engage in regular physical activity and social activities     Follow-up: No follow-ups on file.

## 2023-03-08 NOTE — ASSESSMENT & PLAN NOTE
Found in March 2021 on CT of the abdomen.  Blood pressure is well controlled.  She is currently taking atorvastatin 10 mg daily.  Monitored annually.  She is also established with cardiology

## 2023-03-09 ENCOUNTER — TELEPHONE (OUTPATIENT)
Dept: HEALTH INFORMATION MANAGEMENT | Facility: OTHER | Age: 73
End: 2023-03-09
Payer: MEDICARE

## 2023-03-10 PROBLEM — I49.9 IRREGULAR HEART RATE: Status: ACTIVE | Noted: 2023-03-10

## 2023-03-14 ENCOUNTER — HOSPITAL ENCOUNTER (OUTPATIENT)
Dept: RADIOLOGY | Facility: MEDICAL CENTER | Age: 73
End: 2023-03-14
Attending: NURSE PRACTITIONER
Payer: MEDICARE

## 2023-03-14 DIAGNOSIS — Z12.31 ENCOUNTER FOR SCREENING MAMMOGRAM FOR BREAST CANCER: ICD-10-CM

## 2023-03-14 PROCEDURE — 77063 BREAST TOMOSYNTHESIS BI: CPT

## 2023-04-04 ENCOUNTER — HOSPITAL ENCOUNTER (OUTPATIENT)
Dept: LAB | Facility: MEDICAL CENTER | Age: 73
End: 2023-04-04
Payer: MEDICARE

## 2023-04-04 DIAGNOSIS — I70.0 AORTIC ATHEROSCLEROSIS (HCC): Chronic | ICD-10-CM

## 2023-04-04 DIAGNOSIS — I10 ESSENTIAL HYPERTENSION: Chronic | ICD-10-CM

## 2023-04-04 LAB
ALBUMIN SERPL BCP-MCNC: 3.9 G/DL (ref 3.2–4.9)
ALBUMIN/GLOB SERPL: 1.3 G/DL
ALP SERPL-CCNC: 89 U/L (ref 30–99)
ALT SERPL-CCNC: 9 U/L (ref 2–50)
ANION GAP SERPL CALC-SCNC: 12 MMOL/L (ref 7–16)
AST SERPL-CCNC: 16 U/L (ref 12–45)
BILIRUB SERPL-MCNC: 0.5 MG/DL (ref 0.1–1.5)
BUN SERPL-MCNC: 18 MG/DL (ref 8–22)
CALCIUM ALBUM COR SERPL-MCNC: 9.2 MG/DL (ref 8.5–10.5)
CALCIUM SERPL-MCNC: 9.1 MG/DL (ref 8.5–10.5)
CHLORIDE SERPL-SCNC: 106 MMOL/L (ref 96–112)
CHOLEST SERPL-MCNC: 156 MG/DL (ref 100–199)
CO2 SERPL-SCNC: 23 MMOL/L (ref 20–33)
CREAT SERPL-MCNC: 0.88 MG/DL (ref 0.5–1.4)
ERYTHROCYTE [DISTWIDTH] IN BLOOD BY AUTOMATED COUNT: 46.7 FL (ref 35.9–50)
FASTING STATUS PATIENT QL REPORTED: NORMAL
GFR SERPLBLD CREATININE-BSD FMLA CKD-EPI: 69 ML/MIN/1.73 M 2
GLOBULIN SER CALC-MCNC: 3 G/DL (ref 1.9–3.5)
GLUCOSE SERPL-MCNC: 101 MG/DL (ref 65–99)
HCT VFR BLD AUTO: 39.8 % (ref 37–47)
HDLC SERPL-MCNC: 51 MG/DL
HGB BLD-MCNC: 12.8 G/DL (ref 12–16)
LDLC SERPL CALC-MCNC: 77 MG/DL
MCH RBC QN AUTO: 29.7 PG (ref 27–33)
MCHC RBC AUTO-ENTMCNC: 32.2 G/DL (ref 33.6–35)
MCV RBC AUTO: 92.3 FL (ref 81.4–97.8)
PLATELET # BLD AUTO: 215 K/UL (ref 164–446)
PMV BLD AUTO: 9.9 FL (ref 9–12.9)
POTASSIUM SERPL-SCNC: 4.7 MMOL/L (ref 3.6–5.5)
PROT SERPL-MCNC: 6.9 G/DL (ref 6–8.2)
RBC # BLD AUTO: 4.31 M/UL (ref 4.2–5.4)
SODIUM SERPL-SCNC: 141 MMOL/L (ref 135–145)
TRIGL SERPL-MCNC: 140 MG/DL (ref 0–149)
TSH SERPL DL<=0.005 MIU/L-ACNC: 2.51 UIU/ML (ref 0.38–5.33)
WBC # BLD AUTO: 7.2 K/UL (ref 4.8–10.8)

## 2023-04-04 PROCEDURE — 80053 COMPREHEN METABOLIC PANEL: CPT

## 2023-04-04 PROCEDURE — 80061 LIPID PANEL: CPT

## 2023-04-04 PROCEDURE — 36415 COLL VENOUS BLD VENIPUNCTURE: CPT

## 2023-04-04 PROCEDURE — 84443 ASSAY THYROID STIM HORMONE: CPT

## 2023-04-04 PROCEDURE — 85027 COMPLETE CBC AUTOMATED: CPT

## 2023-04-17 ENCOUNTER — TELEPHONE (OUTPATIENT)
Dept: MEDICAL GROUP | Facility: PHYSICIAN GROUP | Age: 73
End: 2023-04-17
Payer: MEDICARE

## 2023-04-17 NOTE — TELEPHONE ENCOUNTER
----- Message from ANY Nguyen sent at 4/17/2023 10:00 AM PDT -----  Westley Childress,    I have reviewed your labs. Everything looks to be at baseline with some minor fluctuations. If you would like to discuss further, please schedule an appointment with me at your convenience.     Best,  Ortiz

## 2023-04-17 NOTE — TELEPHONE ENCOUNTER
Phone Number Called: [phone] (home) [phone] (work)      Call outcome: Did not leave a detailed message. Requested patient to call back.    Message: 1st attempt

## 2023-11-16 ENCOUNTER — OFFICE VISIT (OUTPATIENT)
Dept: MEDICAL GROUP | Facility: PHYSICIAN GROUP | Age: 73
End: 2023-11-16
Payer: MEDICARE

## 2023-11-16 VITALS
HEART RATE: 66 BPM | DIASTOLIC BLOOD PRESSURE: 74 MMHG | SYSTOLIC BLOOD PRESSURE: 114 MMHG | BODY MASS INDEX: 42.3 KG/M2 | HEIGHT: 65 IN | RESPIRATION RATE: 16 BRPM | TEMPERATURE: 97.2 F | WEIGHT: 253.9 LBS | OXYGEN SATURATION: 95 %

## 2023-11-16 DIAGNOSIS — Z12.11 COLON CANCER SCREENING: ICD-10-CM

## 2023-11-16 DIAGNOSIS — I10 ESSENTIAL HYPERTENSION: Chronic | ICD-10-CM

## 2023-11-16 DIAGNOSIS — D68.9 COAGULATION DEFECT, UNSPECIFIED (HCC): ICD-10-CM

## 2023-11-16 DIAGNOSIS — Z23 NEED FOR VACCINATION: ICD-10-CM

## 2023-11-16 DIAGNOSIS — E78.00 PURE HYPERCHOLESTEROLEMIA: Chronic | ICD-10-CM

## 2023-11-16 PROCEDURE — G0008 ADMIN INFLUENZA VIRUS VAC: HCPCS

## 2023-11-16 PROCEDURE — 99214 OFFICE O/P EST MOD 30 MIN: CPT | Mod: 25

## 2023-11-16 PROCEDURE — 3074F SYST BP LT 130 MM HG: CPT

## 2023-11-16 PROCEDURE — 90662 IIV NO PRSV INCREASED AG IM: CPT

## 2023-11-16 PROCEDURE — 3078F DIAST BP <80 MM HG: CPT

## 2023-11-16 PROCEDURE — RXMED WILLOW AMBULATORY MEDICATION CHARGE

## 2023-11-16 RX ORDER — ATORVASTATIN CALCIUM 10 MG/1
10 TABLET, FILM COATED ORAL DAILY
Qty: 100 TABLET | Refills: 3 | Status: SHIPPED | OUTPATIENT
Start: 2023-11-16 | End: 2024-02-20 | Stop reason: SDUPTHER

## 2023-11-16 RX ORDER — LISINOPRIL 30 MG/1
30 TABLET ORAL DAILY
Qty: 100 TABLET | Refills: 3 | Status: SHIPPED | OUTPATIENT
Start: 2023-11-16 | End: 2024-02-20 | Stop reason: SDUPTHER

## 2023-11-16 ASSESSMENT — FIBROSIS 4 INDEX: FIB4 SCORE: 1.81

## 2023-11-16 NOTE — PROGRESS NOTES
"CC:   Chief Complaint   Patient presents with    Medication Refill        HISTORY OF PRESENT ILLNESS: Patient is a 73 y.o. female established patient who presents today to discuss the following problems below:     Pure hypercholesterolemia  Patient presents for follow-up.  She continues on atorvastatin 10 mg daily as well as a daily baby aspirin.  Lab Results   Component Value Date/Time    CHOLSTRLTOT 156 04/04/2023 09:08 AM    TRIGLYCERIDE 140 04/04/2023 09:08 AM    HDL 51 04/04/2023 09:08 AM    LDL 77 04/04/2023 09:08 AM   ]  The 10-year ASCVD risk score (Isaac RITTER, et al., 2019) is: 13.4%      Essential hypertension  Patient continues on lisinopril 30 mg daily.  She needs this medication refilled today.  She denies headaches, chest pain or shortness of breath.    Review of Systems: Otherwise negative except for as stated above.      Exam: /74 (BP Location: Left arm, Patient Position: Sitting, BP Cuff Size: Large adult)   Pulse 66   Temp 36.2 °C (97.2 °F) (Temporal)   Resp 16   Ht 1.651 m (5' 5\")   Wt 115 kg (253 lb 14.4 oz)   SpO2 95%  Body mass index is 42.25 kg/m².    Physical Exam  Constitutional:       Appearance: Normal appearance.   Eyes:      Extraocular Movements: Extraocular movements intact.   Pulmonary:      Effort: Pulmonary effort is normal.   Musculoskeletal:      Cervical back: Normal range of motion.   Neurological:      General: No focal deficit present.      Mental Status: She is alert and oriented to person, place, and time.   Psychiatric:         Mood and Affect: Mood normal.         Behavior: Behavior normal.         Assessment/Plan:  73 y.o. female with the following -    1. Need for vaccination  - INFLUENZA VACCINE, HIGH DOSE (65+ ONLY)    2. Colon cancer screening  - COLOGUARD (FIT DNA)    3. Coagulation defect, unspecified (HCC)  Chronic, stable. Due to daily aspirin 81mg dosing. Monitor for excessive bruising or bleeding.     4. Pure hypercholesterolemia  Chronic, stable. " Continue medications as below. Labs due as below.   - atorvastatin (LIPITOR) 10 MG Tab; Take 1 Tablet by mouth every day.  Dispense: 100 Tablet; Refill: 3  - Lipid Profile; Future    5. Essential hypertension  Chronic, stable. Continue medications as below. Labs due as below.   - lisinopril (PRINIVIL) 30 MG tablet; Take 1 Tablet by mouth every day.  Dispense: 100 Tablet; Refill: 3  - Comp Metabolic Panel; Future  - CBC WITHOUT DIFFERENTIAL; Future      Follow-up: Return in about 3 months (around 2/16/2024) for lab follow up.    Health Maintenance: Completed      Please note that this dictation was created using voice recognition software. I have made every reasonable attempt to correct obvious errors, but I expect that there are errors of grammar and possibly content that I did not discover before finalizing the note.    Electronically signed by JANETT Beach on November 16, 2023

## 2023-11-16 NOTE — ASSESSMENT & PLAN NOTE
Patient continues on lisinopril 30 mg daily.  She needs this medication refilled today.  She denies headaches, chest pain or shortness of breath.

## 2023-11-16 NOTE — ASSESSMENT & PLAN NOTE
Patient presents for follow-up.  She continues on atorvastatin 10 mg daily as well as a daily baby aspirin.  Lab Results   Component Value Date/Time    CHOLSTRLTOT 156 04/04/2023 09:08 AM    TRIGLYCERIDE 140 04/04/2023 09:08 AM    HDL 51 04/04/2023 09:08 AM    LDL 77 04/04/2023 09:08 AM   ]  The 10-year ASCVD risk score (Isaac RITTER, et al., 2019) is: 13.4%

## 2023-11-20 ENCOUNTER — PHARMACY VISIT (OUTPATIENT)
Dept: PHARMACY | Facility: MEDICAL CENTER | Age: 73
End: 2023-11-20
Payer: COMMERCIAL

## 2024-02-20 ENCOUNTER — OFFICE VISIT (OUTPATIENT)
Dept: MEDICAL GROUP | Facility: PHYSICIAN GROUP | Age: 74
End: 2024-02-20
Payer: MEDICARE

## 2024-02-20 VITALS
TEMPERATURE: 98.9 F | OXYGEN SATURATION: 95 % | RESPIRATION RATE: 16 BRPM | HEART RATE: 74 BPM | DIASTOLIC BLOOD PRESSURE: 64 MMHG | SYSTOLIC BLOOD PRESSURE: 114 MMHG | HEIGHT: 65 IN | BODY MASS INDEX: 43.17 KG/M2 | WEIGHT: 259.1 LBS

## 2024-02-20 DIAGNOSIS — E78.00 PURE HYPERCHOLESTEROLEMIA: Chronic | ICD-10-CM

## 2024-02-20 DIAGNOSIS — R79.89 LOW VITAMIN D LEVEL: Chronic | ICD-10-CM

## 2024-02-20 DIAGNOSIS — Q21.0 VENTRICULAR SEPTAL DEFECT: ICD-10-CM

## 2024-02-20 DIAGNOSIS — I70.0 AORTIC ATHEROSCLEROSIS (HCC): Chronic | ICD-10-CM

## 2024-02-20 DIAGNOSIS — R73.03 PREDIABETES: Chronic | ICD-10-CM

## 2024-02-20 DIAGNOSIS — Z12.31 ENCOUNTER FOR SCREENING MAMMOGRAM FOR MALIGNANT NEOPLASM OF BREAST: ICD-10-CM

## 2024-02-20 DIAGNOSIS — E66.01 MORBID OBESITY (HCC): Chronic | ICD-10-CM

## 2024-02-20 DIAGNOSIS — I87.2 VENOUS INSUFFICIENCY OF BOTH LOWER EXTREMITIES: Chronic | ICD-10-CM

## 2024-02-20 DIAGNOSIS — I10 ESSENTIAL HYPERTENSION: Chronic | ICD-10-CM

## 2024-02-20 PROCEDURE — 3078F DIAST BP <80 MM HG: CPT | Performed by: PHYSICIAN ASSISTANT

## 2024-02-20 PROCEDURE — 3074F SYST BP LT 130 MM HG: CPT | Performed by: PHYSICIAN ASSISTANT

## 2024-02-20 PROCEDURE — 99214 OFFICE O/P EST MOD 30 MIN: CPT | Performed by: PHYSICIAN ASSISTANT

## 2024-02-20 RX ORDER — ATORVASTATIN CALCIUM 10 MG/1
10 TABLET, FILM COATED ORAL DAILY
Qty: 100 TABLET | Refills: 1 | Status: SHIPPED | OUTPATIENT
Start: 2024-02-20 | End: 2024-03-26

## 2024-02-20 RX ORDER — LISINOPRIL 30 MG/1
30 TABLET ORAL DAILY
Qty: 100 TABLET | Refills: 1 | Status: SHIPPED | OUTPATIENT
Start: 2024-02-20

## 2024-02-20 ASSESSMENT — ENCOUNTER SYMPTOMS
CHILLS: 0
FEVER: 0
SHORTNESS OF BREATH: 0

## 2024-02-20 ASSESSMENT — FIBROSIS 4 INDEX: FIB4 SCORE: 1.81

## 2024-02-20 NOTE — PROGRESS NOTES
Subjective:     CC: Medication refill    HPI:   Fior, patient of SAROJ Beach, presents today with the following:      Assessment & Plan by Problem:       Problem List Items Addressed This Visit       Aortic atherosclerosis (HCC) (Chronic)     Chronic, controlled.  Tolerating/continue atorvastatin 10 mg daily.  Referring back to cardiology.         Relevant Medications    lisinopril (PRINIVIL) 30 MG tablet    atorvastatin (LIPITOR) 10 MG Tab    BMI 40.0-44.9, adult (HCC)     Chronic, uncontrolled.  Increase plant-based foods, decrease animal-based foods.  Increase daily activity, aiming for 30-45 minutes brisk exercise daily.          Relevant Orders    CBC WITH DIFFERENTIAL    Comp Metabolic Panel    TSH WITH REFLEX TO FT4    Essential hypertension (Chronic)     Chronic, stable.  Tolerating/continue lisinopril 30 mg daily.         Relevant Medications    lisinopril (PRINIVIL) 30 MG tablet    atorvastatin (LIPITOR) 10 MG Tab    Other Relevant Orders    Comp Metabolic Panel    Low vitamin D level (Chronic)    Relevant Orders    VITAMIN D,25 HYDROXY (DEFICIENCY)    Morbid obesity (HCC) (Chronic)     Chronic, uncontrolled.  Increase plant-based foods, decrease animal-based foods.  Increase daily activity, aiming for 30-45 minutes brisk exercise daily.         Prediabetes (Chronic)    Relevant Orders    HEMOGLOBIN A1C    Pure hypercholesterolemia (Chronic)     Chronic, controlled.  Tolerating/continue atorvastatin 10 mg daily.  The 10-year ASCVD risk score (Isaac DK, et al., 2019) is: 13.4%         Relevant Medications    lisinopril (PRINIVIL) 30 MG tablet    atorvastatin (LIPITOR) 10 MG Tab    Other Relevant Orders    Lipid Profile    Venous insufficiency of both lower extremities (Chronic)    Relevant Medications    lisinopril (PRINIVIL) 30 MG tablet    atorvastatin (LIPITOR) 10 MG Tab    Other Relevant Orders    REFERRAL TO CARDIOLOGY    VSD (ventricular septal defect)    Relevant Medications     "lisinopril (PRINIVIL) 30 MG tablet    atorvastatin (LIPITOR) 10 MG Tab    Other Relevant Orders    REFERRAL TO CARDIOLOGY     Other Visit Diagnoses       Encounter for screening mammogram for malignant neoplasm of breast        Relevant Orders    MA-SCREENING MAMMO BILAT W/TOMOSYNTHESIS W/CAD          Repeat labs and follow-up with SAROJ Beach to discuss results and any potential medication changes    Return for lab discussion.    HCC Gap Form    Last edited 02/20/24 14:05 PST by Mima Handy P.A.-C.         HPI:     Problem   Bmi 40.0-44.9, Adult (Hcc)    Chronic, uncontrolled.  Increase plant-based foods, decrease animal-based foods.  Increase daily activity, aiming for 30-45 minutes brisk exercise daily.        Aortic Atherosclerosis (Hcc)    Chronic, controlled.  Tolerating/continue atorvastatin 10 mg daily.  Referring back to cardiology.     Pure Hypercholesterolemia    Chronic, controlled.  Tolerating/continue atorvastatin 10 mg daily.  The 10-year ASCVD risk score (Isaac DK, et al., 2019) is: 13.4%       Essential Hypertension    Chronic, stable.  Tolerating/continue lisinopril 30 mg daily.     Morbid Obesity (Hcc)    Chronic, uncontrolled.  Increase plant-based foods, decrease animal-based foods.  Increase daily activity, aiming for 30-45 minutes brisk exercise daily.                  ROS:  Review of Systems   Constitutional:  Negative for chills and fever.   Respiratory:  Negative for shortness of breath.    Cardiovascular:  Negative for chest pain.       Objective:     Exam:  /64 (BP Location: Left arm, Patient Position: Sitting, BP Cuff Size: Large adult)   Pulse 74   Temp 37.2 °C (98.9 °F) (Temporal)   Resp 16   Ht 1.651 m (5' 5\")   Wt 118 kg (259 lb 1.6 oz)   SpO2 95%   BMI 43.12 kg/m²  Body mass index is 43.12 kg/m².    Physical Exam  Vitals reviewed.   Constitutional:       General: She is not in acute distress.     Appearance: Normal appearance.   Pulmonary:      Effort: " Pulmonary effort is normal.   Neurological:      General: No focal deficit present.      Mental Status: She is alert.   Psychiatric:         Mood and Affect: Mood normal.         Behavior: Behavior normal.         Judgment: Judgment normal.                Labs:                       Please note that this dictation was created using voice recognition software. I have made every reasonable attempt to correct obvious errors, but I expect that there are errors of grammar and possibly content that I did not discover before finalizing the note.

## 2024-02-20 NOTE — ASSESSMENT & PLAN NOTE
Chronic, uncontrolled.  Increase plant-based foods, decrease animal-based foods.  Increase daily activity, aiming for 30-45 minutes brisk exercise daily.

## 2024-02-20 NOTE — ASSESSMENT & PLAN NOTE
Chronic, controlled.  Tolerating/continue atorvastatin 10 mg daily.  Referring back to cardiology.

## 2024-02-20 NOTE — ASSESSMENT & PLAN NOTE
Chronic, controlled.  Tolerating/continue atorvastatin 10 mg daily.  The 10-year ASCVD risk score (Isaac RITTER, et al., 2019) is: 13.4%

## 2024-02-27 ENCOUNTER — APPOINTMENT (OUTPATIENT)
Dept: CARDIOLOGY | Facility: MEDICAL CENTER | Age: 74
End: 2024-02-27
Attending: PHYSICIAN ASSISTANT
Payer: MEDICARE

## 2024-03-04 ENCOUNTER — TELEPHONE (OUTPATIENT)
Dept: HEALTH INFORMATION MANAGEMENT | Facility: OTHER | Age: 74
End: 2024-03-04
Payer: MEDICARE

## 2024-03-05 ENCOUNTER — TELEPHONE (OUTPATIENT)
Dept: CARDIOLOGY | Facility: MEDICAL CENTER | Age: 74
End: 2024-03-05
Payer: MEDICARE

## 2024-03-06 ENCOUNTER — OFFICE VISIT (OUTPATIENT)
Dept: CARDIOLOGY | Facility: MEDICAL CENTER | Age: 74
End: 2024-03-06
Attending: PHYSICIAN ASSISTANT
Payer: MEDICARE

## 2024-03-06 VITALS
HEIGHT: 65 IN | BODY MASS INDEX: 42.65 KG/M2 | DIASTOLIC BLOOD PRESSURE: 60 MMHG | SYSTOLIC BLOOD PRESSURE: 134 MMHG | RESPIRATION RATE: 16 BRPM | WEIGHT: 256 LBS | HEART RATE: 74 BPM | OXYGEN SATURATION: 96 %

## 2024-03-06 DIAGNOSIS — Q21.0 VENTRICULAR SEPTAL DEFECT: ICD-10-CM

## 2024-03-06 DIAGNOSIS — Z86.711 HISTORY OF PULMONARY EMBOLISM: ICD-10-CM

## 2024-03-06 DIAGNOSIS — I70.0 AORTIC ATHEROSCLEROSIS (HCC): Chronic | ICD-10-CM

## 2024-03-06 DIAGNOSIS — G47.33 OSA (OBSTRUCTIVE SLEEP APNEA): ICD-10-CM

## 2024-03-06 PROCEDURE — 3078F DIAST BP <80 MM HG: CPT | Performed by: INTERNAL MEDICINE

## 2024-03-06 PROCEDURE — 99211 OFF/OP EST MAY X REQ PHY/QHP: CPT | Performed by: INTERNAL MEDICINE

## 2024-03-06 PROCEDURE — 3075F SYST BP GE 130 - 139MM HG: CPT | Performed by: INTERNAL MEDICINE

## 2024-03-06 PROCEDURE — 99203 OFFICE O/P NEW LOW 30 MIN: CPT | Performed by: INTERNAL MEDICINE

## 2024-03-06 PROCEDURE — 93005 ELECTROCARDIOGRAM TRACING: CPT | Performed by: INTERNAL MEDICINE

## 2024-03-06 ASSESSMENT — FIBROSIS 4 INDEX: FIB4 SCORE: 1.81

## 2024-03-06 NOTE — PROGRESS NOTES
Chief Complaint   Patient presents with    Irregular Heart Beat     New patient        Subjective     Fior Ray is a 73 y.o. female who presents today for initial consultation regarding a VSD.  She has congenital VSD and undergoing monitoring but has not been seen back in cardiology in several years.  Chronic dyspnea on exertion unchanged for many years, morbid obesity and hyperlipidemia with well-controlled hypertension.  She has no cardiovascular complaints.  Indicates she does not smoke drink or use drugs and has no family history of precocious CAD but did have CAD in the father at age of 74.    Past Medical History:   Diagnosis Date    Cancer (HCC) 2012    skin    Dental disorder 09/11/2019    upper denture    Diverticulitis     Hypertension     Morbid obesity ([facility])     Obesity     TOMI (obstructive sleep apnea)     PE (pulmonary thromboembolism) ([facility])     Urinary bladder disorder     Urinary incontinence 09/11/2019    Venous insufficiency of both lower extremities     VSD (ventricular septal defect and aortic arch hypoplasia      Past Surgical History:   Procedure Laterality Date    UMBILICAL HERNIA REPAIR  9/17/2019    Procedure: REPAIR, HERNIA, UMBILICAL- INCARCERATED;  Surgeon: Truong Daniels M.D.;  Location: SURGERY SAME DAY [facility];  Service: General    KNEE ARTHROPLASTY TOTAL Right 2014    HYSTERECTOMY, VAGINAL      VEIN STRIPPING       Family History   Problem Relation Age of Onset    Lung Disease Mother         COPD     Heart Attack Father     No Known Problems Sister     No Known Problems Brother     No Known Problems Maternal Grandmother     No Known Problems Maternal Grandfather     No Known Problems Paternal Grandmother     Heart Attack Paternal Grandfather     Heart Disease Daughter     No Known Problems Daughter     Heart Disease Son      Social History     Socioeconomic History    Marital status:      Spouse name: Not on file    Number of children: Not on file    Years  Total Number Of Fractions Rx 2: 15 "of education: Not on file    Highest education level: Not on file   Occupational History    Not on file   Tobacco Use    Smoking status: Never    Smokeless tobacco: Never   Vaping Use    Vaping Use: Never used   Substance and Sexual Activity    Alcohol use: No    Drug use: No    Sexual activity: Not on file   Other Topics Concern    Not on file   Social History Narrative    Not on file     Social Determinants of Health     Financial Resource Strain: Not on file   Food Insecurity: No Food Insecurity (3/25/2020)    Hunger Vital Sign     Worried About Running Out of Food in the Last Year: Never true     Ran Out of Food in the Last Year: Never true   Transportation Needs: No Transportation Needs (3/25/2020)    PRAPARE - Transportation     Lack of Transportation (Medical): No     Lack of Transportation (Non-Medical): No   Physical Activity: Not on file   Stress: Not on file   Social Connections: Not on file   Intimate Partner Violence: Not on file   Housing Stability: Not on file     Allergies   Allergen Reactions    Mustard [Allyl Isothiocyanate] Anaphylaxis    Prochlorperazine Edisylate [Compazine] Anaphylaxis    Doxycycline Nausea and Unspecified     Causes headache and nausea     Outpatient Encounter Medications as of 3/6/2024   Medication Sig Dispense Refill    lisinopril (PRINIVIL) 30 MG tablet Take 1 Tablet by mouth every day. 100 Tablet 1    atorvastatin (LIPITOR) 10 MG Tab Take 1 Tablet by mouth every day. 100 Tablet 1    vitamin D2, Ergocalciferol, (DRISDOL) 1.25 MG (91656 UT) Cap capsule Take 1 Capsule by mouth every 7 days. 12 Capsule 0    aspirin 81 MG EC tablet Take 81 mg by mouth every morning.       No facility-administered encounter medications on file as of 3/6/2024.     Review of Systems   All other systems reviewed and are negative.             Objective     /60 (BP Location: Left arm, Patient Position: Sitting)   Pulse 74   Resp 16   Ht 1.651 m (5' 5\")   Wt 116 kg (256 lb)   SpO2 96%   " BMI 42.60 kg/m²     Physical Exam  Vitals and nursing note reviewed.   Constitutional:       General: She is not in acute distress.     Appearance: Normal appearance.   HENT:      Head: Normocephalic and atraumatic.      Right Ear: External ear normal.      Left Ear: External ear normal.      Nose: Nose normal.   Eyes:      Conjunctiva/sclera: Conjunctivae normal.   Cardiovascular:      Rate and Rhythm: Normal rate and regular rhythm.      Pulses: Normal pulses.      Heart sounds: No murmur heard.  Pulmonary:      Effort: Pulmonary effort is normal. No respiratory distress.      Breath sounds: Normal breath sounds.   Abdominal:      General: There is no distension.      Palpations: Abdomen is soft.   Musculoskeletal:      Cervical back: No rigidity or tenderness.      Right lower leg: Edema present.      Left lower leg: Edema present.   Skin:     General: Skin is warm and dry.      Capillary Refill: Capillary refill takes 2 to 3 seconds.   Neurological:      General: No focal deficit present.      Mental Status: She is alert and oriented to person, place, and time.   Psychiatric:         Mood and Affect: Mood normal.         Behavior: Behavior normal.         Thought Content: Thought content normal.       LABS:  Lab Results   Component Value Date/Time    CHOLSTRLTOT 156 04/04/2023 09:08 AM    LDL 77 04/04/2023 09:08 AM    HDL 51 04/04/2023 09:08 AM    TRIGLYCERIDE 140 04/04/2023 09:08 AM       Lab Results   Component Value Date/Time    WBC 7.2 04/04/2023 09:08 AM    RBC 4.31 04/04/2023 09:08 AM    HEMOGLOBIN 12.8 04/04/2023 09:08 AM    HEMATOCRIT 39.8 04/04/2023 09:08 AM    MCV 92.3 04/04/2023 09:08 AM    NEUTSPOLYS 47.90 04/04/2022 08:08 AM    LYMPHOCYTES 37.70 04/04/2022 08:08 AM    MONOCYTES 9.50 04/04/2022 08:08 AM    EOSINOPHILS 3.90 04/04/2022 08:08 AM    BASOPHILS 0.80 04/04/2022 08:08 AM     Lab Results   Component Value Date/Time    SODIUM 141 04/04/2023 09:08 AM    POTASSIUM 4.7 04/04/2023 09:08 AM     "CHLORIDE 106 04/04/2023 09:08 AM    CO2 23 04/04/2023 09:08 AM    GLUCOSE 101 (H) 04/04/2023 09:08 AM    BUN 18 04/04/2023 09:08 AM    CREATININE 0.88 04/04/2023 09:08 AM    CREATININE 0.7 05/17/2006 08:23 AM     Lab Results   Component Value Date    HBA1C 5.6 04/04/2022      Lab Results   Component Value Date/Time    ALKPHOSPHAT 89 04/04/2023 09:08 AM    ASTSGOT 16 04/04/2023 09:08 AM    ALTSGPT 9 04/04/2023 09:08 AM    TBILIRUBIN 0.5 04/04/2023 09:08 AM      Lab Results   Component Value Date/Time    BNPBTYPENAT 214 (H) 04/24/2006 05:45 AM      No results found for: \"TSH\"  Lab Results   Component Value Date/Time    PROTHROMBTM 13.1 09/22/2019 12:10 PM    INR 0.97 09/22/2019 12:10 PM        Imaging reviewed           Assessment & Plan     1. VSD (ventricular septal defect)  EKG    EC-ECHOCARDIOGRAM COMPLETE W/O CONT      2. History of pulmonary embolism        3. TOMI (obstructive sleep apnea)        4. Aortic atherosclerosis (HCC)            Medical Decision Making: Today's Assessment/Status/Plan:        Small muscular VSD previously noted without sequela.  Multifactorial dyspnea unchanged for many years.  Some lower extremity edema.  She has no specific cardiovascular complaints and presents for follow-up of her VSD.  Recommend echocardiographic surveillance and primary prevention measures under the care of her PCP including a goal LDL less than 70 given presence of aortic atherosclerosis.  She has to follow-up with her PCP as labs are pending with them regarding this particular issue as well as primary prevention.    Thank you for this interesting consultation. It was my pleasure to see Fior Ray today.    Keith Marcelino MD, FACC, [facility]  Division of Interventional Cardiology  [facility]                  "

## 2024-03-07 ENCOUNTER — HOSPITAL ENCOUNTER (OUTPATIENT)
Dept: LAB | Facility: MEDICAL CENTER | Age: 74
End: 2024-03-07
Attending: PHYSICIAN ASSISTANT
Payer: MEDICARE

## 2024-03-07 DIAGNOSIS — E78.00 PURE HYPERCHOLESTEROLEMIA: Chronic | ICD-10-CM

## 2024-03-07 DIAGNOSIS — I10 ESSENTIAL HYPERTENSION: Chronic | ICD-10-CM

## 2024-03-07 DIAGNOSIS — R73.03 PREDIABETES: Chronic | ICD-10-CM

## 2024-03-07 DIAGNOSIS — R79.89 LOW VITAMIN D LEVEL: Chronic | ICD-10-CM

## 2024-03-07 LAB
25(OH)D3 SERPL-MCNC: 21 NG/ML (ref 30–100)
ALBUMIN SERPL BCP-MCNC: 4.2 G/DL (ref 3.2–4.9)
ALBUMIN/GLOB SERPL: 1.3 G/DL
ALP SERPL-CCNC: 88 U/L (ref 30–99)
ALT SERPL-CCNC: 8 U/L (ref 2–50)
ANION GAP SERPL CALC-SCNC: 14 MMOL/L (ref 7–16)
AST SERPL-CCNC: 15 U/L (ref 12–45)
BASOPHILS # BLD AUTO: 1.1 % (ref 0–1.8)
BASOPHILS # BLD: 0.07 K/UL (ref 0–0.12)
BILIRUB SERPL-MCNC: 0.4 MG/DL (ref 0.1–1.5)
BUN SERPL-MCNC: 23 MG/DL (ref 8–22)
CALCIUM ALBUM COR SERPL-MCNC: 9.2 MG/DL (ref 8.5–10.5)
CALCIUM SERPL-MCNC: 9.4 MG/DL (ref 8.5–10.5)
CHLORIDE SERPL-SCNC: 108 MMOL/L (ref 96–112)
CHOLEST SERPL-MCNC: 207 MG/DL (ref 100–199)
CO2 SERPL-SCNC: 21 MMOL/L (ref 20–33)
CREAT SERPL-MCNC: 0.82 MG/DL (ref 0.5–1.4)
EKG IMPRESSION: NORMAL
EOSINOPHIL # BLD AUTO: 0.22 K/UL (ref 0–0.51)
EOSINOPHIL NFR BLD: 3.5 % (ref 0–6.9)
ERYTHROCYTE [DISTWIDTH] IN BLOOD BY AUTOMATED COUNT: 44.2 FL (ref 35.9–50)
EST. AVERAGE GLUCOSE BLD GHB EST-MCNC: 111 MG/DL
GFR SERPLBLD CREATININE-BSD FMLA CKD-EPI: 75 ML/MIN/1.73 M 2
GLOBULIN SER CALC-MCNC: 3.3 G/DL (ref 1.9–3.5)
GLUCOSE SERPL-MCNC: 95 MG/DL (ref 65–99)
HBA1C MFR BLD: 5.5 % (ref 4–5.6)
HCT VFR BLD AUTO: 41.2 % (ref 37–47)
HDLC SERPL-MCNC: 48 MG/DL
HGB BLD-MCNC: 13.7 G/DL (ref 12–16)
IMM GRANULOCYTES # BLD AUTO: 0.01 K/UL (ref 0–0.11)
IMM GRANULOCYTES NFR BLD AUTO: 0.2 % (ref 0–0.9)
LDLC SERPL CALC-MCNC: 137 MG/DL
LYMPHOCYTES # BLD AUTO: 2.16 K/UL (ref 1–4.8)
LYMPHOCYTES NFR BLD: 34 % (ref 22–41)
MCH RBC QN AUTO: 30.6 PG (ref 27–33)
MCHC RBC AUTO-ENTMCNC: 33.3 G/DL (ref 32.2–35.5)
MCV RBC AUTO: 92 FL (ref 81.4–97.8)
MONOCYTES # BLD AUTO: 0.61 K/UL (ref 0–0.85)
MONOCYTES NFR BLD AUTO: 9.6 % (ref 0–13.4)
NEUTROPHILS # BLD AUTO: 3.29 K/UL (ref 1.82–7.42)
NEUTROPHILS NFR BLD: 51.6 % (ref 44–72)
NRBC # BLD AUTO: 0 K/UL
NRBC BLD-RTO: 0 /100 WBC (ref 0–0.2)
PLATELET # BLD AUTO: 246 K/UL (ref 164–446)
PMV BLD AUTO: 9.7 FL (ref 9–12.9)
POTASSIUM SERPL-SCNC: 4.6 MMOL/L (ref 3.6–5.5)
PROT SERPL-MCNC: 7.5 G/DL (ref 6–8.2)
RBC # BLD AUTO: 4.48 M/UL (ref 4.2–5.4)
SODIUM SERPL-SCNC: 143 MMOL/L (ref 135–145)
TRIGL SERPL-MCNC: 109 MG/DL (ref 0–149)
TSH SERPL DL<=0.005 MIU/L-ACNC: 2.66 UIU/ML (ref 0.38–5.33)
WBC # BLD AUTO: 6.4 K/UL (ref 4.8–10.8)

## 2024-03-07 PROCEDURE — 83036 HEMOGLOBIN GLYCOSYLATED A1C: CPT

## 2024-03-07 PROCEDURE — 85025 COMPLETE CBC W/AUTO DIFF WBC: CPT

## 2024-03-07 PROCEDURE — 93010 ELECTROCARDIOGRAM REPORT: CPT | Performed by: INTERNAL MEDICINE

## 2024-03-07 PROCEDURE — 84443 ASSAY THYROID STIM HORMONE: CPT

## 2024-03-07 PROCEDURE — 82306 VITAMIN D 25 HYDROXY: CPT

## 2024-03-07 PROCEDURE — 80053 COMPREHEN METABOLIC PANEL: CPT

## 2024-03-07 PROCEDURE — 36415 COLL VENOUS BLD VENIPUNCTURE: CPT

## 2024-03-07 PROCEDURE — 80061 LIPID PANEL: CPT

## 2024-03-15 ENCOUNTER — HOSPITAL ENCOUNTER (OUTPATIENT)
Dept: RADIOLOGY | Facility: MEDICAL CENTER | Age: 74
End: 2024-03-15
Attending: PHYSICIAN ASSISTANT
Payer: MEDICARE

## 2024-03-15 DIAGNOSIS — Z12.31 ENCOUNTER FOR SCREENING MAMMOGRAM FOR MALIGNANT NEOPLASM OF BREAST: ICD-10-CM

## 2024-03-15 PROCEDURE — 77067 SCR MAMMO BI INCL CAD: CPT

## 2024-03-26 ENCOUNTER — OFFICE VISIT (OUTPATIENT)
Dept: MEDICAL GROUP | Facility: PHYSICIAN GROUP | Age: 74
End: 2024-03-26
Payer: MEDICARE

## 2024-03-26 VITALS
SYSTOLIC BLOOD PRESSURE: 122 MMHG | TEMPERATURE: 98.2 F | HEIGHT: 65 IN | DIASTOLIC BLOOD PRESSURE: 72 MMHG | RESPIRATION RATE: 18 BRPM | WEIGHT: 256 LBS | BODY MASS INDEX: 42.65 KG/M2 | OXYGEN SATURATION: 91 % | HEART RATE: 73 BPM

## 2024-03-26 DIAGNOSIS — Z78.0 POST-MENOPAUSAL: ICD-10-CM

## 2024-03-26 DIAGNOSIS — Q21.0 VENTRICULAR SEPTAL DEFECT: ICD-10-CM

## 2024-03-26 DIAGNOSIS — R79.89 LOW VITAMIN D LEVEL: Chronic | ICD-10-CM

## 2024-03-26 DIAGNOSIS — I70.0 AORTIC ATHEROSCLEROSIS (HCC): Chronic | ICD-10-CM

## 2024-03-26 PROCEDURE — 99214 OFFICE O/P EST MOD 30 MIN: CPT

## 2024-03-26 PROCEDURE — 3074F SYST BP LT 130 MM HG: CPT

## 2024-03-26 PROCEDURE — 3078F DIAST BP <80 MM HG: CPT

## 2024-03-26 RX ORDER — ATORVASTATIN CALCIUM 20 MG/1
20 TABLET, FILM COATED ORAL NIGHTLY
Qty: 100 TABLET | Refills: 3 | Status: CANCELLED | OUTPATIENT
Start: 2024-03-26

## 2024-03-26 RX ORDER — ATORVASTATIN CALCIUM 10 MG/1
10 TABLET, FILM COATED ORAL NIGHTLY
Qty: 100 TABLET | Refills: 3 | Status: SHIPPED | OUTPATIENT
Start: 2024-03-26

## 2024-03-26 ASSESSMENT — PATIENT HEALTH QUESTIONNAIRE - PHQ9: CLINICAL INTERPRETATION OF PHQ2 SCORE: 0

## 2024-03-26 ASSESSMENT — FIBROSIS 4 INDEX: FIB4 SCORE: 1.6

## 2024-03-26 NOTE — PROGRESS NOTES
CC:   Chief Complaint   Patient presents with    Lab Follow-up        HPI: Patient is a 74 y.o. female presenting to discuss the following:    Subjective & Assessment/Plan:    Problem List Items Addressed This Visit       VSD (ventricular septal defect)     This is a chronic, stable medical condition.   Saw cardiology, plan for updated echo, continue BP and atherosclerosis management. Continue lisinopril 30mg QHS.          Relevant Medications    atorvastatin (LIPITOR) 10 MG Tab    Aortic atherosclerosis (HCC) (Chronic)     Chronic, not at goal.    Patient is currently on atorvastatin 10 mg nightly.  She also takes a daily baby aspirin.  Previously LDL was within goal, however LDL has recently increased. She reports she was not able to get a refill of her atorvastatin and has been off of the medication for several weeks.   Lab Results   Component Value Date/Time    CHOLSTRLTOT 207 (H) 03/07/2024 08:55 AM    TRIGLYCERIDE 109 03/07/2024 08:55 AM    HDL 48 03/07/2024 08:55 AM     (H) 03/07/2024 08:55 AM   ]           Relevant Medications    atorvastatin (LIPITOR) 10 MG Tab    Other Relevant Orders    Lipid Profile    Comp Metabolic Panel    Low vitamin D level (Chronic)     Chronic. Recent vitamin D levels low at 21.   Continue replacement.   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    Pure hypercholesterolemia 05/10/2019    Venous insufficiency of both lower extremities     Prediabetes 01/23/2019    History of diverticulitis 01/21/2019    History of recurrent deep vein thrombosis (DVT)  "01/21/2019    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 100 Tablet 3    lisinopril (PRINIVIL) 30 MG tablet Take 1 Tablet by mouth every day. 100 Tablet 1    vitamin D2, Ergocalciferol, (DRISDOL) 1.25 MG (30569 UT) Cap capsule Take 1 Capsule by mouth every 7 days. 12 Capsule 0    aspirin 81 MG EC tablet Take 81 mg by mouth every morning.       No current facility-administered medications for this visit.       Allergies as of 03/26/2024 - Reviewed 03/26/2024   Allergen Reaction Noted    Mustard [allyl isothiocyanate] Anaphylaxis 10/12/2018    Prochlorperazine edisylate [compazine] Anaphylaxis 03/26/2015    Doxycycline Nausea and Unspecified 07/27/2020       Health Maintenance: Outstanding health maintenance items were ordered if applicable to patient including screening and preventative measures.     Review of Systems: Otherwise negative except for as stated above.      Objective:   /72   Pulse 73   Temp 36.8 °C (98.2 °F) (Temporal)   Resp 18   Ht 1.651 m (5' 5\")   Wt 116 kg (256 lb)   SpO2 91%  Body mass index is 42.6 kg/m².  Vital signs reviewed  Physical Exam  Physical Exam  Constitutional:       Appearance: Normal appearance.   Eyes:      Extraocular Movements: Extraocular movements intact.   Pulmonary:      Effort: Pulmonary effort is normal.   Neurological:      General: No focal deficit present.      Mental Status: She is alert and oriented to person, place, and time.   Psychiatric:         Mood and Affect: Mood normal.         Behavior: Behavior normal.       Follow-up: Return in about 3 months (around 6/26/2024) " for lab follow up.    Please note that this dictation was created using voice recognition software. I have made every reasonable attempt to correct obvious errors, but I expect that there are errors of grammar and possibly content that I did not discover before finalizing the note.    Electronically signed by JANETT Beach on March 26, 2024

## 2024-03-26 NOTE — ASSESSMENT & PLAN NOTE
Chronic, not at goal.    Patient is currently on atorvastatin 10 mg nightly.  She also takes a daily baby aspirin.  Previously LDL was within goal, however LDL has recently increased. She reports she was not able to get a refill of her atorvastatin and has been off of the medication for several weeks.   Lab Results   Component Value Date/Time    CHOLSTRLTOT 207 (H) 03/07/2024 08:55 AM    TRIGLYCERIDE 109 03/07/2024 08:55 AM    HDL 48 03/07/2024 08:55 AM     (H) 03/07/2024 08:55 AM   ]     14-Feb-2022 15-Feb-2022

## 2024-03-26 NOTE — ASSESSMENT & PLAN NOTE
This is a chronic, stable medical condition.   Saw cardiology, plan for updated echo, continue BP and atherosclerosis management. Continue lisinopril 30mg QHS.

## 2024-05-13 ENCOUNTER — TELEPHONE (OUTPATIENT)
Dept: HEALTH INFORMATION MANAGEMENT | Facility: OTHER | Age: 74
End: 2024-05-13
Payer: MEDICARE

## 2024-05-28 NOTE — ASSESSMENT & PLAN NOTE
Provided education on heart healthy diet with adequate intake of fruits, vegetables, and whole grains. Encourage 30 minutes of moderate exercise 3-4 times a week.

## 2024-05-28 NOTE — ASSESSMENT & PLAN NOTE
BMI is 42.74 kg/m2. Provided education on heart healthy diet with adequate intake of fruits, vegetables, and whole grains. Encourage 30 minutes of moderate exercise 3-4 times a week.

## 2024-05-28 NOTE — ASSESSMENT & PLAN NOTE
Chronic, ongoing. Reviewed lipid profile from March 2024. Currently taking atorvastatin 10 mg daily. Reports that she is not exercising regularly, but stays active around the house. Denies chest pain and claudication.

## 2024-05-28 NOTE — ASSESSMENT & PLAN NOTE
Chronic, stable. Reviewed CT of the abdomen and pelvis from 2018. Continues on aspirin and statin therapy daily. Denies chest pain and weakness of extremities.

## 2024-05-28 NOTE — ASSESSMENT & PLAN NOTE
Chronic, stable. Currently taking lisinopril 30 mg daily. In-office blood pressure is 126/76. Denies chest pain, lightheadedness, and lower extremity edema. Followed by cardiology, Dr. Marcelino.

## 2024-05-29 ENCOUNTER — OFFICE VISIT (OUTPATIENT)
Dept: FAMILY PLANNING/WOMEN'S HEALTH CLINIC | Facility: PHYSICIAN GROUP | Age: 74
End: 2024-05-29
Payer: MEDICARE

## 2024-05-29 VITALS
HEIGHT: 64 IN | SYSTOLIC BLOOD PRESSURE: 126 MMHG | WEIGHT: 249 LBS | BODY MASS INDEX: 42.51 KG/M2 | DIASTOLIC BLOOD PRESSURE: 76 MMHG

## 2024-05-29 DIAGNOSIS — I10 ESSENTIAL HYPERTENSION: Chronic | ICD-10-CM

## 2024-05-29 DIAGNOSIS — E66.01 MORBID OBESITY WITH BMI OF 40.0-44.9, ADULT (HCC): ICD-10-CM

## 2024-05-29 DIAGNOSIS — E78.00 PURE HYPERCHOLESTEROLEMIA: Chronic | ICD-10-CM

## 2024-05-29 DIAGNOSIS — I70.0 AORTIC ATHEROSCLEROSIS (HCC): Chronic | ICD-10-CM

## 2024-05-29 DIAGNOSIS — Z86.711 HISTORY OF PULMONARY EMBOLISM: ICD-10-CM

## 2024-05-29 DIAGNOSIS — R79.89 LOW VITAMIN D LEVEL: Chronic | ICD-10-CM

## 2024-05-29 DIAGNOSIS — Q21.0 VENTRICULAR SEPTAL DEFECT: ICD-10-CM

## 2024-05-29 PROBLEM — J01.10 ACUTE NON-RECURRENT FRONTAL SINUSITIS: Chronic | Status: RESOLVED | Noted: 2022-09-22 | Resolved: 2024-05-29

## 2024-05-29 SDOH — ECONOMIC STABILITY: FOOD INSECURITY: WITHIN THE PAST 12 MONTHS, THE FOOD YOU BOUGHT JUST DIDN'T LAST AND YOU DIDN'T HAVE MONEY TO GET MORE.: NEVER TRUE

## 2024-05-29 SDOH — ECONOMIC STABILITY: FOOD INSECURITY: WITHIN THE PAST 12 MONTHS, YOU WORRIED THAT YOUR FOOD WOULD RUN OUT BEFORE YOU GOT MONEY TO BUY MORE.: NEVER TRUE

## 2024-05-29 SDOH — ECONOMIC STABILITY: HOUSING INSECURITY
IN THE LAST 12 MONTHS, WAS THERE A TIME WHEN YOU DID NOT HAVE A STEADY PLACE TO SLEEP OR SLEPT IN A SHELTER (INCLUDING NOW)?: NO

## 2024-05-29 SDOH — ECONOMIC STABILITY: INCOME INSECURITY: IN THE LAST 12 MONTHS, WAS THERE A TIME WHEN YOU WERE NOT ABLE TO PAY THE MORTGAGE OR RENT ON TIME?: NO

## 2024-05-29 SDOH — ECONOMIC STABILITY: INCOME INSECURITY: HOW HARD IS IT FOR YOU TO PAY FOR THE VERY BASICS LIKE FOOD, HOUSING, MEDICAL CARE, AND HEATING?: NOT HARD AT ALL

## 2024-05-29 SDOH — ECONOMIC STABILITY: HOUSING INSECURITY: IN THE LAST 12 MONTHS, HOW MANY PLACES HAVE YOU LIVED?: 1

## 2024-05-29 ASSESSMENT — ENCOUNTER SYMPTOMS: GENERAL WELL-BEING: GOOD

## 2024-05-29 ASSESSMENT — FIBROSIS 4 INDEX: FIB4 SCORE: 1.6

## 2024-05-29 ASSESSMENT — PAIN SCALES - GENERAL: PAINLEVEL: NO PAIN

## 2024-05-29 ASSESSMENT — PATIENT HEALTH QUESTIONNAIRE - PHQ9: CLINICAL INTERPRETATION OF PHQ2 SCORE: 0

## 2024-05-29 ASSESSMENT — ACTIVITIES OF DAILY LIVING (ADL): BATHING_REQUIRES_ASSISTANCE: 0

## 2024-05-29 NOTE — ASSESSMENT & PLAN NOTE
Stable. Reports history of pulmonary embolism following right knee surgery in 2012. States that she was previously on warfarin, but is currently taking aspirin 81 mg daily.

## 2024-05-29 NOTE — PROGRESS NOTES
Comprehensive Health Assessment Program     Fior Ray is a 74 y.o. here for her comprehensive health assessment.    Patient Active Problem List    Diagnosis Date Noted    Morbid obesity with BMI of 40.0-44.9, adult (HCC) 03/10/2023    Irregular heart rate 03/10/2023    Low vitamin D level 09/22/2022    Aortic atherosclerosis (HCC) 03/29/2022    Abnormal sensation in right ear 03/29/2022    Left knee DJD 03/28/2022    Pure hypercholesterolemia 05/10/2019    Venous insufficiency of both lower extremities     Prediabetes 01/23/2019    History of diverticulitis 01/21/2019    History of recurrent deep vein thrombosis (DVT) 01/21/2019    Presence of inferior vena cava filter 01/21/2019    History of basal cell cancer 01/21/2019    TOMI (obstructive sleep apnea) 10/14/2018    History of pulmonary embolism 10/12/2018    Essential hypertension 10/12/2018    VSD (ventricular septal defect) 10/12/2018       Current Outpatient Medications   Medication Sig Dispense Refill    atorvastatin (LIPITOR) 10 MG Tab Take 1 Tablet by mouth every evening. 100 Tablet 3    lisinopril (PRINIVIL) 30 MG tablet Take 1 Tablet by mouth every day. 100 Tablet 1    vitamin D2, Ergocalciferol, (DRISDOL) 1.25 MG (87594 UT) Cap capsule Take 1 Capsule by mouth every 7 days. 12 Capsule 0    aspirin 81 MG EC tablet Take 81 mg by mouth every morning.       No current facility-administered medications for this visit.          Current supplements as per medication list.     Allergies:   Mustard [allyl isothiocyanate], Prochlorperazine edisylate [compazine], and Doxycycline  Social History     Tobacco Use    Smoking status: Never    Smokeless tobacco: Never   Vaping Use    Vaping status: Never Used   Substance Use Topics    Alcohol use: No    Drug use: No     Family History   Problem Relation Age of Onset    Lung Disease Mother         COPD     Heart Attack Father     No Known Problems Sister     No Known Problems Brother     No Known Problems  Maternal Grandmother     No Known Problems Maternal Grandfather     No Known Problems Paternal Grandmother     Heart Attack Paternal Grandfather     Heart Disease Daughter     No Known Problems Daughter     Heart Disease Son      Fior  has a past medical history of Acute non-recurrent frontal sinusitis (09/22/2022), Cancer ([facility]) (2012), Dental disorder (09/11/2019), Diverticulitis, Hypertension, Morbid obesity ([facility]), Obesity, TOMI (obstructive sleep apnea), PE (pulmonary thromboembolism) ([facility]), Urinary bladder disorder, Urinary incontinence (09/11/2019), Venous insufficiency of both lower extremities, and VSD (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    She has no past medical history of Diabetes ([facility]) or Renal disorder.   Past Surgical History:   Procedure Laterality Date    UMBILICAL HERNIA REPAIR  9/17/2019    Procedure: REPAIR, HERNIA, UMBILICAL- INCARCERATED;  Surgeon: Truong Daniels M.D.;  Location: SURGERY SAME DAY [facility];  Service: General    KNEE ARTHROPLASTY TOTAL Right 2014    HYSTERECTOMY, VAGINAL      VEIN STRIPPING         Screening:  In the last six months have you experienced any leakage of urine? Yes, reports occasional leakage.     Depression Screening  Little interest or pleasure in doing things?  0 - not at all  Feeling down, depressed , or hopeless? 0 - not at all  Patient Health Questionnaire Score: 0     If depressive symptoms identified deferred to follow up visit unless specifically addressed in assessment and plan.    Interpretation of PHQ-9 Total Score   Score Severity   1-4 No Depression   5-9 Mild Depression   10-14 Moderate Depression   15-19 Moderately Severe Depression   20-27 Severe Depression    Screening for Cognitive Impairment  Do you or any of your friends or family members have any concern about your memory? No  Three Minute Recall (Leader, Season, Table) 3/3    [place] clock face with all 12 numbers and set the hands to show 10 minutes after 11.  Yes  5/5  Cognitive concerns identified deferred for follow up unless specifically addressed in assessment and plan.    Fall Risk Assessment  Has the patient had two or more falls in the last year or any fall with injury in the last year?  No    Safety Assessment  Do you always wear your seatbelt?  Yes  Any changes to home needed to function safely? No  Difficulty hearing.  No  Patient counseled about all safety risks that were identified.    Functional Assessment ADLs  Are there any barriers preventing you from cooking for yourself or meeting nutritional needs?  No.    Are there any barriers preventing you from driving safely or obtaining transportation?  No.    Are there any barriers preventing you from using a telephone or calling for help?  No    Are there any barriers preventing you from shopping?  No.    Are there any barriers preventing you from taking care of your own finances?  No    Are there any barriers preventing you from managing your medications?  No    Are there any barriers preventing you from showering, bathing or dressing yourself? No    Are there any barriers preventing you from doing housework or laundry? No  Are there any barriers preventing you from using the toilet?No  Are you currently engaging in any exercise or physical activity?  No.      Self-Assessment of Health  What is your perception of your health? Good  Do you sleep more than six hours a night? Yes  In the past 7 days, how much did pain keep you from doing your normal work? None  Do you spend quality time with family or friends (virtually or in person)? Yes  Do you usually eat a heart healthy diet that constists of a variety of fruits, vegetables, whole grains and fiber? Yes  Do you eat foods high in fat and/or Fast Food more than three times per week? No    Advance Care Planning  Do you have an Advance Directive, Living Will, Durable Power of , or POLST? No  Provided patient with educational brochure regarding Advance Care  Planning.                  Health Maintenance Summary            Ordered - Bone Density Scan (Every 5 Years) Ordered on 3/26/2024      09/22/2005  DS-BONE DENSITY STUDY (DEXA)              Overdue - COVID-19 Vaccine (3 - 2023-24 season) Overdue since 9/1/2023 02/12/2021  Imm Admin: PFIZER PURPLE CAP SARS-COV-2 VACCINATION (12+)    01/22/2021  Imm Admin: PFIZER PURPLE CAP SARS-COV-2 VACCINATION (12+)              Mammogram (Yearly) Next due on 3/15/2025      03/15/2024  MA-SCREENING MAMMO BILAT W/TOMOSYNTHESIS W/CAD    03/14/2023  MA-SCREENING MAMMO BILAT W/TOMOSYNTHESIS W/CAD    01/25/2021  MA-DIAGNOSTIC MAMMO RIGHT W/TOMOSYNTHESIS W/CAD    06/15/2020  MA-DIAGNOSTIC MAMMO BILAT W/TOMOSYNTHESIS W/O CAD    09/03/2019  MA-MAMMO DIAGNOSTIC UNILAT W/CELENA W/CAD RIGHT    Only the first 5 history entries have been loaded, but more history exists.              Annual Wellness Visit (Yearly) Next due on 5/29/2025 05/29/2024  Done - Comprehensive Health Assessment    05/29/2024  Level of Service: CT ANNUAL WELLNESS VISIT-INCLUDES PPPS SUBSEQUE*    03/08/2023  Visit Dx: Encounter for Medicare annual wellness exam    03/08/2023  Level of Service: CT ANNUAL WELLNESS VISIT-INCLUDES PPPS SUBSEQUE*    08/26/2021  Level of Service: CT ANNUAL WELLNESS VISIT-INCLUDES PPPS SUBSEQUE*    Only the first 5 history entries have been loaded, but more history exists.              Colorectal Cancer Screening (Colon Cancer Screening Cologuard Stool (FIT DNA) - Preferred) Next due on 11/16/2026 11/16/2023  COLOGUARD COLON CANCER SCREENING    11/16/2023  COLOGUARD COLON CANCER SCREENING    09/20/2020  Colon Cancer Screening Cologuard Stool (FIT DNA) (Done)              IMM DTaP/Tdap/Td Vaccine (2 - Td or Tdap) Next due on 3/28/2032      03/28/2022  Imm Admin: Tdap Vaccine              Pneumococcal Vaccine: 65+ Years (Series Information) Completed      04/16/2018  Imm Admin: Pneumococcal polysaccharide vaccine (PPSV-23)     2017  Imm Admin: Pneumococcal Conjugate Vaccine (Prevnar/PCV-13)              Hepatitis C Screening  Completed      2020  Hepatitis C Antibody component of HCV Scrn ( [id number] 1xLife)              Zoster (Shingles) Vaccines (Series Information) Completed      2021  Imm Admin: Zoster Vaccine Recombinant (RZV) (SHINGRIX)    10/28/2020  Imm Admin: Zoster Vaccine Recombinant (RZV) (SHINGRIX)              Influenza Vaccine (Series Information) Completed      2023  Imm Admin: Influenza Vaccine Adult HD    2022  Imm Admin: Influenza Vaccine Adult HD    10/12/2021  Imm Admin: Influenza Vaccine Adult HD    10/06/2020  Imm Admin: Influenza Vaccine Adult HD    10/07/2019  Imm Admin: Influenza Vaccine Adult HD    Only the first 5 history entries have been loaded, but more history exists.              Hepatitis A Vaccine (Hep A) (Series Information) Aged Out      No completion history exists for this topic.              HPV Vaccines (Series Information) Aged Out      No completion history exists for this topic.              Polio Vaccine (Inactivated Polio) (Series Information) Aged Out      No completion history exists for this topic.              Meningococcal Immunization (Series Information) Aged Out      No completion history exists for this topic.              Discontinued - Hepatitis B Vaccine (Hep B)  Discontinued      No completion history exists for this topic.                    Patient Care Team:  ANY Nguyen as PCP - General (Nurse Practitioner Family)  ANY Nguyen as PCP - H Paneled (Nurse Practitioner Family)  Vein [place]  as Consulting Physician (Vascular Surgery)  GIA ColeyNNEAL (Inactive) as [facility]       Financial Resource Strain: Low Risk  (2024)    Overall Financial Resource Strain (CARDIA)     Difficulty of Paying Living Expenses: Not hard at all      Transportation Needs: No Transportation Needs  "(5/29/2024)    PRAPARE - Transportation     Lack of Transportation (Medical): No     Lack of Transportation (Non-Medical): No      Food Insecurity: No Food Insecurity (5/29/2024)    Hunger Vital Sign     Worried About Running Out of Food in the Last Year: Never true     Ran Out of Food in the Last Year: Never true        Encounter Vitals  Blood Pressure : 126/76  Weight: 113 kg (249 lb)  Height: 162.6 cm (5' 4\")  BMI (Calculated): 42.74  Pain Score: No pain     Alert, oriented in no acute distress.  Eye contact is good, speech goal directed, affect calm.    Assessment and Plan. The following treatment and monitoring plan is recommended:    Aortic atherosclerosis (HCC)  Chronic, stable. Reviewed CT of the abdomen and pelvis from 2018. Continues on aspirin and statin therapy daily. Denies chest pain and weakness of extremities.    Essential hypertension  Chronic, stable. Currently taking lisinopril 30 mg daily. In-office blood pressure is 126/76. Denies chest pain, lightheadedness, and lower extremity edema. Followed by cardiology, Dr. Marcelino.    History of pulmonary embolism  Stable. Reports history of pulmonary embolism following right knee surgery in 2012. States that she was previously on warfarin. Currently taking aspirin 81 mg daily.     Low vitamin D level  Chronic, stable. Currently supplementing with cholecalciferol daily. Followed by primary care provider.     Morbid obesity with BMI of 40.0-44.9, adult (HCC)  BMI is 42.74 kg/m2. Provided education on heart healthy diet with adequate intake of fruits, vegetables, and whole grains. Encourage 30 minutes of moderate exercise 3-4 times a week.    Pure hypercholesterolemia  Chronic, ongoing. Reviewed lipid profile from March 2024. Currently taking atorvastatin 10 mg daily. Reports that she is not exercising regularly, but stays active around the house. Offered [facility] gym resources, declined at this time. Denies chest pain and claudication.    VSD " (ventricular septal defect)  Chronic, stable. Currently taking lisinopril 30 mg daily. Denies lightheadedness and shortness of breath. Followed by cardiology, Dr. Marcelino.      Services suggested: No services needed at this time  Health Care Screening: Age-appropriate preventive services recommended by USPTF and ACIP covered by Medicare were discussed today. Services ordered if indicated and agreed upon by the patient.  Referrals offered: Community-based lifestyle interventions to reduce health risks and promote self-management and wellness, fall prevention, nutrition, physical activity, tobacco-use cessation, weight loss, and mental health services as per orders if indicated.    Discussion today about general wellness and lifestyle habits:    Prevent falls and reduce trip hazards; Cautioned about securing or removing rugs.  Have a working fire alarm and carbon monoxide detector.  Engage in regular physical activity and social activities.    Follow-up: Return for appointment with Primary Care Provider as needed.

## 2024-05-29 NOTE — ASSESSMENT & PLAN NOTE
Chronic, stable. Currently taking lisinopril 30 mg daily. Denies lightheadedness and shortness of breath. Followed by cardiology, Dr. Marcelino.   Initial Anesthesia Post-op Note    Patient: Pallavi Rahman  Procedure(s) Performed: ENDOSCOPIC RETROGRADE CHOLANGIOPANCREATOGRAPHY [[id number]]  Anesthesia type: General    Vital Last Value   Temperature 36.7 °C (98 °F) (08/27/18 1600)   Pulse 54 (08/27/18 2021)   Respiratory Rate 16 (08/27/18 2021)   Non-Invasive   Blood Pressure 131/59 (08/27/18 2021)   Arterial  Blood Pressure     Pulse Oximetry 98 % (08/27/18 2021)     Last 24 I/O:   Intake/Output Summary (Last 24 hours) at 08/27/18 2021  Last data filed at 08/27/18 1700   Gross per 24 hour   Intake             2274 ml   Output                0 ml   Net             2274 ml       PATIENT LOCATION: ICU  POST-OP VITAL SIGNS: stable  LEVEL OF CONSCIOUSNESS: participates in exam, awake, answers questions appropriately and confused  RESPIRATORY STATUS: spontaneous ventilation and nasal cannula  CARDIOVASCULAR: blood pressure returned to baseline  HYDRATION: euvolemic    PAIN MANAGEMENT: well controlled  NAUSEA: None  POST-OP ASSESSMENT: no complications, patient tolerated procedure well with no complications and no evidence of recall  COMPLICATIONS: none  Comments: Denies SOB, HA better. Denies pain  HANDOFF:  Handoff to receiving nurse was performed and questions were answered

## 2024-08-22 ENCOUNTER — HOSPITAL ENCOUNTER (OUTPATIENT)
Dept: LAB | Facility: MEDICAL CENTER | Age: 74
End: 2024-08-22
Payer: MEDICARE

## 2024-08-22 DIAGNOSIS — I70.0 AORTIC ATHEROSCLEROSIS (HCC): Chronic | ICD-10-CM

## 2024-08-22 LAB
ALBUMIN SERPL BCP-MCNC: 3.8 G/DL (ref 3.2–4.9)
ALBUMIN/GLOB SERPL: 1.2 G/DL
ALP SERPL-CCNC: 89 U/L (ref 30–99)
ALT SERPL-CCNC: 10 U/L (ref 2–50)
ANION GAP SERPL CALC-SCNC: 15 MMOL/L (ref 7–16)
AST SERPL-CCNC: 19 U/L (ref 12–45)
BILIRUB SERPL-MCNC: 0.6 MG/DL (ref 0.1–1.5)
BUN SERPL-MCNC: 25 MG/DL (ref 8–22)
CALCIUM ALBUM COR SERPL-MCNC: 9.8 MG/DL (ref 8.5–10.5)
CALCIUM SERPL-MCNC: 9.6 MG/DL (ref 8.5–10.5)
CHLORIDE SERPL-SCNC: 105 MMOL/L (ref 96–112)
CHOLEST SERPL-MCNC: 144 MG/DL (ref 100–199)
CO2 SERPL-SCNC: 19 MMOL/L (ref 20–33)
CREAT SERPL-MCNC: 1.21 MG/DL (ref 0.5–1.4)
GFR SERPLBLD CREATININE-BSD FMLA CKD-EPI: 47 ML/MIN/1.73 M 2
GLOBULIN SER CALC-MCNC: 3.3 G/DL (ref 1.9–3.5)
GLUCOSE SERPL-MCNC: 100 MG/DL (ref 65–99)
HDLC SERPL-MCNC: 48 MG/DL
LDLC SERPL CALC-MCNC: 76 MG/DL
POTASSIUM SERPL-SCNC: 5 MMOL/L (ref 3.6–5.5)
PROT SERPL-MCNC: 7.1 G/DL (ref 6–8.2)
SODIUM SERPL-SCNC: 139 MMOL/L (ref 135–145)
TRIGL SERPL-MCNC: 101 MG/DL (ref 0–149)

## 2024-08-22 PROCEDURE — 36415 COLL VENOUS BLD VENIPUNCTURE: CPT

## 2024-08-22 PROCEDURE — 80061 LIPID PANEL: CPT

## 2024-08-22 PROCEDURE — 80053 COMPREHEN METABOLIC PANEL: CPT

## 2024-08-30 ENCOUNTER — HOSPITAL ENCOUNTER (OUTPATIENT)
Dept: RADIOLOGY | Facility: MEDICAL CENTER | Age: 74
End: 2024-08-30
Payer: MEDICARE

## 2024-08-30 DIAGNOSIS — Z78.0 POST-MENOPAUSAL: ICD-10-CM

## 2024-08-30 PROCEDURE — 77080 DXA BONE DENSITY AXIAL: CPT

## 2024-09-04 DIAGNOSIS — L98.9 SKIN LESION: ICD-10-CM

## 2024-09-12 ENCOUNTER — OFFICE VISIT (OUTPATIENT)
Dept: DERMATOLOGY | Facility: IMAGING CENTER | Age: 74
End: 2024-09-12
Payer: MEDICARE

## 2024-09-12 DIAGNOSIS — L57.0 ACTINIC KERATOSIS: ICD-10-CM

## 2024-09-12 DIAGNOSIS — L82.1 SK (SEBORRHEIC KERATOSIS): ICD-10-CM

## 2024-09-12 PROCEDURE — 17003 DESTRUCT PREMALG LES 2-14: CPT | Performed by: NURSE PRACTITIONER

## 2024-09-12 PROCEDURE — 17000 DESTRUCT PREMALG LESION: CPT | Performed by: NURSE PRACTITIONER

## 2024-09-12 NOTE — PROGRESS NOTES
DERMATOLOGY NOTE  NEW VISIT       Chief complaint: Skin Lesion     HPI/location: crusty lesions on face   Time present: unkn   Painful lesion: Yes  Itching lesion: No  Enlarging lesion: Yes  Anything make it better or worse?      History of skin cancer: Yes, Details: nose unkn type 2016   History of precancers/actinic keratoses: No  History of biopsies:Yes, Details: above   History of blistering/severe sunburns:Yes, Details: multiple   Family history of skin cancer:Yes, Details: mom and brother unkn type  Family history of atypical moles:No    Allergies   Allergen Reactions    Mustard [Allyl Isothiocyanate] Anaphylaxis    Prochlorperazine Edisylate [Compazine] Anaphylaxis    Doxycycline Nausea and Unspecified     Causes headache and nausea        MEDICATIONS:  Medications relevant to specialty reviewed.     REVIEW OF SYSTEMS:   Positive for skin (see HPI)  Negative for fevers and chills       EXAM:  There were no vitals taken for this visit.  Constitutional: Well-developed, well-nourished, and in no distress.     A focused skin exam was performed including the affected areas of the face. Notable findings on exam today listed below and/or in assessment/plan.     Ill-defined erythematous gritty/scaly papules over the nose, L infraorbital region and L lateral cheek  Skin colored waxy stuck on appearing papule to L zygoma      IMPRESSION / PLAN:    1. Actinic keratosis  CRYOTHERAPY:  Risks (including, but not limited to: skin discoloration, redness, blister, blood blister, recurrence, need for further treatment, infection, scar) and benefits of cryotherapy discussed. Patient verbally agreed to proceed with treatment. 1 cryotherapy freeze thaw cycles of 10 seconds were applied to 3 lesions on face as noted on exam with cryac. Patient tolerated procedure well. Aftercare instructions given--no specific care needed unless irritated during healing process, can apply Vaseline with small band-aid if needed.      2. SK  (seborrheic keratosis)  - Benign-appearing nature of lesions discussed during exam.   - Courtesy LN2 applied to SK on L zygoma for cosmesis  - Advised to continue to monitor for any return to clinic for new or concerning changes.        Discussed risks associated with LN2, Patient verbalized understanding and agrees with plan regarding the above            Please note that this dictation was created using voice recognition software. I have made every reasonable attempt to correct obvious errors, but I expect that there are errors of grammar and possibly content that I did not discover before finalizing the note.      Return to clinic in: Return for PRN. and as needed for any new or changing skin lesions.

## 2024-10-01 ENCOUNTER — HOSPITAL ENCOUNTER (OUTPATIENT)
Dept: LAB | Facility: MEDICAL CENTER | Age: 74
End: 2024-10-01
Payer: MEDICARE

## 2024-10-01 ENCOUNTER — APPOINTMENT (OUTPATIENT)
Dept: MEDICAL GROUP | Facility: PHYSICIAN GROUP | Age: 74
End: 2024-10-01
Payer: MEDICARE

## 2024-10-01 VITALS
HEART RATE: 97 BPM | BODY MASS INDEX: 42.93 KG/M2 | SYSTOLIC BLOOD PRESSURE: 106 MMHG | DIASTOLIC BLOOD PRESSURE: 68 MMHG | TEMPERATURE: 97.8 F | HEIGHT: 65 IN | RESPIRATION RATE: 22 BRPM | WEIGHT: 257.7 LBS | OXYGEN SATURATION: 97 %

## 2024-10-01 DIAGNOSIS — R94.4 DECREASED GLOMERULAR FILTRATION RATE: ICD-10-CM

## 2024-10-01 DIAGNOSIS — I70.0 AORTIC ATHEROSCLEROSIS (HCC): Chronic | ICD-10-CM

## 2024-10-01 DIAGNOSIS — R79.89 LOW VITAMIN D LEVEL: ICD-10-CM

## 2024-10-01 DIAGNOSIS — Z23 NEED FOR VACCINATION: ICD-10-CM

## 2024-10-01 DIAGNOSIS — I10 ESSENTIAL HYPERTENSION: Chronic | ICD-10-CM

## 2024-10-01 LAB
APPEARANCE UR: CLEAR
BACTERIA #/AREA URNS HPF: ABNORMAL /HPF
BILIRUB UR QL STRIP.AUTO: NEGATIVE
COLOR UR: YELLOW
EPI CELLS #/AREA URNS HPF: ABNORMAL /HPF
GLUCOSE UR STRIP.AUTO-MCNC: NEGATIVE MG/DL
HYALINE CASTS #/AREA URNS LPF: ABNORMAL /LPF
KETONES UR STRIP.AUTO-MCNC: NEGATIVE MG/DL
LEUKOCYTE ESTERASE UR QL STRIP.AUTO: ABNORMAL
MICRO URNS: ABNORMAL
NITRITE UR QL STRIP.AUTO: NEGATIVE
PH UR STRIP.AUTO: 6 [PH] (ref 5–8)
PROT UR QL STRIP: NEGATIVE MG/DL
RBC # URNS HPF: ABNORMAL /HPF
RBC UR QL AUTO: NEGATIVE
SP GR UR STRIP.AUTO: 1.01
UROBILINOGEN UR STRIP.AUTO-MCNC: 0.2 MG/DL
WBC #/AREA URNS HPF: ABNORMAL /HPF
YEAST BUDDING URNS QL: PRESENT /HPF

## 2024-10-01 PROCEDURE — 99214 OFFICE O/P EST MOD 30 MIN: CPT | Mod: 25

## 2024-10-01 PROCEDURE — 36415 COLL VENOUS BLD VENIPUNCTURE: CPT

## 2024-10-01 PROCEDURE — 81001 URINALYSIS AUTO W/SCOPE: CPT

## 2024-10-01 PROCEDURE — 3078F DIAST BP <80 MM HG: CPT

## 2024-10-01 PROCEDURE — 90662 IIV NO PRSV INCREASED AG IM: CPT

## 2024-10-01 PROCEDURE — G0008 ADMIN INFLUENZA VIRUS VAC: HCPCS

## 2024-10-01 PROCEDURE — 3074F SYST BP LT 130 MM HG: CPT

## 2024-10-01 PROCEDURE — 80053 COMPREHEN METABOLIC PANEL: CPT

## 2024-10-01 RX ORDER — LISINOPRIL 30 MG/1
30 TABLET ORAL DAILY
Qty: 100 TABLET | Refills: 6 | Status: SHIPPED | OUTPATIENT
Start: 2024-10-01

## 2024-10-01 RX ORDER — ATORVASTATIN CALCIUM 10 MG/1
10 TABLET, FILM COATED ORAL NIGHTLY
Qty: 100 TABLET | Refills: 6 | Status: SHIPPED | OUTPATIENT
Start: 2024-10-01

## 2024-10-01 ASSESSMENT — FIBROSIS 4 INDEX: FIB4 SCORE: 1.81

## 2024-10-02 LAB
ALBUMIN SERPL BCP-MCNC: 4.3 G/DL (ref 3.2–4.9)
ALBUMIN/GLOB SERPL: 1.3 G/DL
ALP SERPL-CCNC: 96 U/L (ref 30–99)
ALT SERPL-CCNC: 11 U/L (ref 2–50)
ANION GAP SERPL CALC-SCNC: 16 MMOL/L (ref 7–16)
AST SERPL-CCNC: 19 U/L (ref 12–45)
BILIRUB SERPL-MCNC: 0.6 MG/DL (ref 0.1–1.5)
BUN SERPL-MCNC: 18 MG/DL (ref 8–22)
CALCIUM ALBUM COR SERPL-MCNC: 9.7 MG/DL (ref 8.5–10.5)
CALCIUM SERPL-MCNC: 9.9 MG/DL (ref 8.5–10.5)
CHLORIDE SERPL-SCNC: 100 MMOL/L (ref 96–112)
CO2 SERPL-SCNC: 23 MMOL/L (ref 20–33)
CREAT SERPL-MCNC: 1.04 MG/DL (ref 0.5–1.4)
GFR SERPLBLD CREATININE-BSD FMLA CKD-EPI: 56 ML/MIN/1.73 M 2
GLOBULIN SER CALC-MCNC: 3.3 G/DL (ref 1.9–3.5)
GLUCOSE SERPL-MCNC: 99 MG/DL (ref 65–99)
POTASSIUM SERPL-SCNC: 4.4 MMOL/L (ref 3.6–5.5)
PROT SERPL-MCNC: 7.6 G/DL (ref 6–8.2)
SODIUM SERPL-SCNC: 139 MMOL/L (ref 135–145)

## 2025-03-27 ENCOUNTER — OFFICE VISIT (OUTPATIENT)
Dept: MEDICAL GROUP | Facility: PHYSICIAN GROUP | Age: 75
End: 2025-03-27
Payer: MEDICARE

## 2025-03-27 VITALS
TEMPERATURE: 100.3 F | WEIGHT: 235 LBS | RESPIRATION RATE: 14 BRPM | HEIGHT: 65 IN | HEART RATE: 86 BPM | OXYGEN SATURATION: 95 % | BODY MASS INDEX: 39.15 KG/M2

## 2025-03-27 DIAGNOSIS — E66.9 OBESITY (BMI 30-39.9): ICD-10-CM

## 2025-03-27 DIAGNOSIS — B37.31 YEAST VAGINITIS: ICD-10-CM

## 2025-03-27 DIAGNOSIS — F41.9 ANXIETY: ICD-10-CM

## 2025-03-27 DIAGNOSIS — I10 ESSENTIAL HYPERTENSION: Chronic | ICD-10-CM

## 2025-03-27 DIAGNOSIS — R63.4 UNINTENTIONAL WEIGHT LOSS: ICD-10-CM

## 2025-03-27 DIAGNOSIS — I70.0 AORTIC ATHEROSCLEROSIS (HCC): Chronic | ICD-10-CM

## 2025-03-27 PROCEDURE — 99214 OFFICE O/P EST MOD 30 MIN: CPT

## 2025-03-27 RX ORDER — LISINOPRIL 30 MG/1
30 TABLET ORAL DAILY
Qty: 100 TABLET | Refills: 6 | Status: SHIPPED | OUTPATIENT
Start: 2025-03-27

## 2025-03-27 RX ORDER — DIAZEPAM 2 MG/1
TABLET ORAL
Qty: 2 TABLET | Refills: 0 | Status: SHIPPED | OUTPATIENT
Start: 2025-03-27 | End: 2025-03-30

## 2025-03-27 RX ORDER — FLUCONAZOLE 150 MG/1
150 TABLET ORAL DAILY
Qty: 3 TABLET | Refills: 0 | Status: SHIPPED | OUTPATIENT
Start: 2025-03-27 | End: 2025-03-31

## 2025-03-27 RX ORDER — ATORVASTATIN CALCIUM 10 MG/1
10 TABLET, FILM COATED ORAL NIGHTLY
Qty: 100 TABLET | Refills: 6 | Status: SHIPPED | OUTPATIENT
Start: 2025-03-27

## 2025-03-27 ASSESSMENT — PATIENT HEALTH QUESTIONNAIRE - PHQ9: CLINICAL INTERPRETATION OF PHQ2 SCORE: 0

## 2025-03-27 ASSESSMENT — FIBROSIS 4 INDEX: FIB4 SCORE: 1.75

## 2025-03-31 ENCOUNTER — HOSPITAL ENCOUNTER (OUTPATIENT)
Dept: LAB | Facility: MEDICAL CENTER | Age: 75
End: 2025-03-31
Payer: MEDICARE

## 2025-03-31 DIAGNOSIS — R63.4 UNINTENTIONAL WEIGHT LOSS: ICD-10-CM

## 2025-03-31 LAB
ALBUMIN SERPL BCP-MCNC: 3.4 G/DL (ref 3.2–4.9)
ALBUMIN/GLOB SERPL: 0.9 G/DL
ALP SERPL-CCNC: 89 U/L (ref 30–99)
ALT SERPL-CCNC: <5 U/L (ref 2–50)
ANION GAP SERPL CALC-SCNC: 13 MMOL/L (ref 7–16)
APPEARANCE UR: ABNORMAL
AST SERPL-CCNC: 20 U/L (ref 12–45)
BACTERIA #/AREA URNS HPF: ABNORMAL /HPF
BASOPHILS # BLD AUTO: 0.5 % (ref 0–1.8)
BASOPHILS # BLD: 0.05 K/UL (ref 0–0.12)
BILIRUB SERPL-MCNC: 0.3 MG/DL (ref 0.1–1.5)
BILIRUB UR QL STRIP.AUTO: NEGATIVE
BUN SERPL-MCNC: 19 MG/DL (ref 8–22)
CALCIUM ALBUM COR SERPL-MCNC: 10 MG/DL (ref 8.5–10.5)
CALCIUM SERPL-MCNC: 9.5 MG/DL (ref 8.5–10.5)
CASTS URNS QL MICRO: ABNORMAL /LPF (ref 0–2)
CHLORIDE SERPL-SCNC: 102 MMOL/L (ref 96–112)
CO2 SERPL-SCNC: 22 MMOL/L (ref 20–33)
COLOR UR: YELLOW
CREAT SERPL-MCNC: 1.15 MG/DL (ref 0.5–1.4)
CRP SERPL HS-MCNC: 1.45 MG/DL (ref 0–0.75)
EOSINOPHIL # BLD AUTO: 0.23 K/UL (ref 0–0.51)
EOSINOPHIL NFR BLD: 2.5 % (ref 0–6.9)
EPITHELIAL CELLS 1715: ABNORMAL /HPF (ref 0–5)
ERYTHROCYTE [DISTWIDTH] IN BLOOD BY AUTOMATED COUNT: 45.7 FL (ref 35.9–50)
ERYTHROCYTE [SEDIMENTATION RATE] IN BLOOD BY WESTERGREN METHOD: 40 MM/HOUR (ref 0–25)
GFR SERPLBLD CREATININE-BSD FMLA CKD-EPI: 50 ML/MIN/1.73 M 2
GLOBULIN SER CALC-MCNC: 3.9 G/DL (ref 1.9–3.5)
GLUCOSE SERPL-MCNC: 95 MG/DL (ref 65–99)
GLUCOSE UR STRIP.AUTO-MCNC: NEGATIVE MG/DL
HCT VFR BLD AUTO: 39.2 % (ref 37–47)
HGB BLD-MCNC: 12.4 G/DL (ref 12–16)
IMM GRANULOCYTES # BLD AUTO: 0.06 K/UL (ref 0–0.11)
IMM GRANULOCYTES NFR BLD AUTO: 0.6 % (ref 0–0.9)
KETONES UR STRIP.AUTO-MCNC: ABNORMAL MG/DL
LDH SERPL L TO P-CCNC: 168 U/L (ref 107–266)
LEUKOCYTE ESTERASE UR QL STRIP.AUTO: ABNORMAL
LYMPHOCYTES # BLD AUTO: 3.7 K/UL (ref 1–4.8)
LYMPHOCYTES NFR BLD: 39.7 % (ref 22–41)
MCH RBC QN AUTO: 28.4 PG (ref 27–33)
MCHC RBC AUTO-ENTMCNC: 31.6 G/DL (ref 32.2–35.5)
MCV RBC AUTO: 89.9 FL (ref 81.4–97.8)
MICRO URNS: ABNORMAL
MONOCYTES # BLD AUTO: 0.9 K/UL (ref 0–0.85)
MONOCYTES NFR BLD AUTO: 9.6 % (ref 0–13.4)
NEUTROPHILS # BLD AUTO: 4.39 K/UL (ref 1.82–7.42)
NEUTROPHILS NFR BLD: 47.1 % (ref 44–72)
NITRITE UR QL STRIP.AUTO: POSITIVE
NRBC # BLD AUTO: 0 K/UL
NRBC BLD-RTO: 0 /100 WBC (ref 0–0.2)
PH UR STRIP.AUTO: 5.5 [PH] (ref 5–8)
PLATELET # BLD AUTO: 329 K/UL (ref 164–446)
PMV BLD AUTO: 9 FL (ref 9–12.9)
POTASSIUM SERPL-SCNC: 4.4 MMOL/L (ref 3.6–5.5)
PROT SERPL-MCNC: 7.3 G/DL (ref 6–8.2)
PROT UR QL STRIP: 100 MG/DL
RBC # BLD AUTO: 4.36 M/UL (ref 4.2–5.4)
RBC # URNS HPF: ABNORMAL /HPF (ref 0–2)
RBC UR QL AUTO: ABNORMAL
SODIUM SERPL-SCNC: 137 MMOL/L (ref 135–145)
SP GR UR STRIP.AUTO: 1.02
T4 FREE SERPL-MCNC: 1.45 NG/DL (ref 0.93–1.7)
TSH SERPL-ACNC: 1.35 UIU/ML (ref 0.35–5.5)
UROBILINOGEN UR STRIP.AUTO-MCNC: 1 EU/DL
WBC # BLD AUTO: 9.3 K/UL (ref 4.8–10.8)
WBC #/AREA URNS HPF: >100 /HPF

## 2025-03-31 PROCEDURE — 80053 COMPREHEN METABOLIC PANEL: CPT

## 2025-03-31 PROCEDURE — 81001 URINALYSIS AUTO W/SCOPE: CPT

## 2025-03-31 PROCEDURE — 85025 COMPLETE CBC W/AUTO DIFF WBC: CPT

## 2025-03-31 PROCEDURE — 85652 RBC SED RATE AUTOMATED: CPT

## 2025-03-31 PROCEDURE — 86140 C-REACTIVE PROTEIN: CPT

## 2025-03-31 PROCEDURE — 36415 COLL VENOUS BLD VENIPUNCTURE: CPT

## 2025-03-31 PROCEDURE — 84439 ASSAY OF FREE THYROXINE: CPT

## 2025-03-31 PROCEDURE — 84443 ASSAY THYROID STIM HORMONE: CPT

## 2025-03-31 PROCEDURE — 83615 LACTATE (LD) (LDH) ENZYME: CPT

## 2025-03-31 RX ORDER — FLUCONAZOLE 150 MG/1
150 TABLET ORAL
Qty: 2 TABLET | Refills: 0 | Status: SHIPPED | OUTPATIENT
Start: 2025-03-31

## 2025-03-31 NOTE — PROGRESS NOTES
Verbal consent was acquired by the patient to use [employer] ambient listening note generation during this visit     Subjective:     HPI:   History of Present Illness  The patient is a 75-year-old female with a medical history significant for essential hypertension and aortic atherosclerosis, currently managed with atorvastatin 10 mg nightly, lisinopril 30 mg daily, and daily low-dose aspirin. She reports a weight loss of 20 pounds over the past 6 months, with her current weight being 235 pounds.    The patient describes symptoms of dyspepsia, reduced food intake, and constipation without evidence of melena. She remains vigilant regarding stool color due to a history of gastrointestinal bleeding. She has experienced new-onset nocturnal diaphoresis for the past month and attributes her fatigue to the recent weight loss. Her sleep is frequently interrupted, with periods of wakefulness lasting 1-2 hours. She denies generalized pruritus but notes localized itching upon scratching. She reports pain associated with arm elevation and is uncertain about the presence of fever. Additionally, she has been prescribed anxiolytic medication to manage anxiety during computed tomography (CT) scans.    MEDICATIONS  Atorvastatin, lisinopril, baby aspirin    HCC Gap Form    Last edited 03/31/25 16:44 PDT by ANY Nguyen         Assessment & Plan:     Problem List Items Addressed This Visit       Essential hypertension (Chronic)    Relevant Medications    atorvastatin (LIPITOR) 10 MG Tab    lisinopril (PRINIVIL) 30 MG tablet    Aortic atherosclerosis (HCC) (Chronic)    Relevant Medications    atorvastatin (LIPITOR) 10 MG Tab    lisinopril (PRINIVIL) 30 MG tablet    Obesity (BMI 30-39.9)     Other Visit Diagnoses         Unintentional weight loss        Relevant Orders    OCCULT BLOOD FECES IMMUNOASSAY    LDH    CRP QUANTITIVE (NON-CARDIAC)    Sed Rate (Completed)    TSH+FREE T4    Comp Metabolic Panel    CBC WITH  DIFFERENTIAL (Completed)    CT-CHEST (THORAX) W/O    URINALYSIS (Completed)      Anxiety          Yeast vaginitis                  Assessment & Plan  1. Unintentional weight loss:  Undiagnosed concern with uncertain prognosis.   20 pounds over 6 months, likely contributing to feeling cold. Having some hot flashes and night sweats with low grade fever, concerning for B symptoms.   - Order labs  - Order imaging  - Prescribed Xanax for CT scan anxiety    2. Essential HTN  This is a chronic, stable medical condition.   Lisinopril 30mg daily   BP well controlled, no side effects   - Order labs    3. Aortic atherosclerosis   This is a chronic, stable medical condition.   Atorvastatin 10mg QHS, aspirin 81mg daily     4. Vaginitis  This is an acute, uncomplicated problem.   Results for orders placed or performed during the hospital encounter of 10/01/24   URINALYSIS    Collection Time: 10/01/24  1:43 PM    Specimen: Urine   Result Value Ref Range    Color Yellow     Character Clear     Specific Gravity 1.009 <1.035    Ph 6.0 5.0 - 8.0    Glucose Negative Negative mg/dL    Ketones Negative Negative mg/dL    Protein Negative Negative mg/dL    Bilirubin Negative Negative    Urobilinogen, Urine 0.2 Negative    Nitrite Negative Negative    Leukocyte Esterase Moderate (A) Negative    Occult Blood Negative Negative    Micro Urine Req Microscopic    Comp Metabolic Panel    Collection Time: 10/01/24  1:43 PM   Result Value Ref Range    Sodium 139 135 - 145 mmol/L    Potassium 4.4 3.6 - 5.5 mmol/L    Chloride 100 96 - 112 mmol/L    Co2 23 20 - 33 mmol/L    Anion Gap 16.0 7.0 - 16.0    Glucose 99 65 - 99 mg/dL    Bun 18 8 - 22 mg/dL    Creatinine 1.04 0.50 - 1.40 mg/dL    Calcium 9.9 8.5 - 10.5 mg/dL    Correct Calcium 9.7 8.5 - 10.5 mg/dL    AST(SGOT) 19 12 - 45 U/L    ALT(SGPT) 11 2 - 50 U/L    Alkaline Phosphatase 96 30 - 99 U/L    Total Bilirubin 0.6 0.1 - 1.5 mg/dL    Albumin 4.3 3.2 - 4.9 g/dL    Total Protein 7.6 6.0 - 8.2 g/dL     Globulin 3.3 1.9 - 3.5 g/dL    A-G Ratio 1.3 g/dL   URINE MICROSCOPIC (W/UA)    Collection Time: 10/01/24  1:43 PM   Result Value Ref Range    WBC 20-50 (A) /hpf    RBC 0-2 /hpf    Bacteria Few (A) None /hpf    Epithelial Cells Few /hpf    Hyaline Cast 0-2 /lpf    Budding Yeast Present (A) Absent /hpf   ESTIMATED GFR    Collection Time: 10/01/24  1:43 PM   Result Value Ref Range    GFR (CKD-EPI) 56 (A) >60 mL/min/1.73 m 2     Treat yeast infection. Follow for culture results.         Follow-up  - Schedule CT scan      Health Maintenance: Orders placed as applicable to patient     Objective:     Exam:  Objective:  Vitals:    03/27/25 1332   Pulse: 86   Resp: 14   Temp: 37.9 °C (100.3 °F)   SpO2: 95%     Physical Exam  Physical Exam    Constitutional:       Appearance: Normal appearance.   Eyes:      Extraocular Movements: Extraocular movements intact.   Pulmonary:      Effort: Pulmonary effort is normal.   Neurological:      General: No focal deficit present.      Mental Status: She is alert and oriented to person, place, and time.   Psychiatric:         Mood and Affect: Mood normal.         Behavior: Behavior normal.       Return if symptoms worsen or fail to improve.    Please note that this dictation was created using voice recognition software. I have made every reasonable attempt to correct obvious errors, but I expect that there are errors of grammar and possibly content that I did not discover before finalizing the note.

## 2025-04-01 ENCOUNTER — RESULTS FOLLOW-UP (OUTPATIENT)
Dept: MEDICAL GROUP | Facility: PHYSICIAN GROUP | Age: 75
End: 2025-04-01
Payer: MEDICARE

## 2025-04-01 DIAGNOSIS — N30.90 CYSTITIS: ICD-10-CM

## 2025-04-01 RX ORDER — NITROFURANTOIN 25; 75 MG/1; MG/1
100 CAPSULE ORAL 2 TIMES DAILY
Qty: 10 CAPSULE | Refills: 0 | Status: SHIPPED | OUTPATIENT
Start: 2025-04-01 | End: 2025-04-06

## 2025-04-02 NOTE — TELEPHONE ENCOUNTER
----- Message from Nurse Practitioner ANY Beach sent at 4/1/2025  9:09 PM PDT -----  Please call patient and advise that she does have a UTI, I have sent in antibiotics for her     Thank you  Ortiz

## 2025-04-09 ENCOUNTER — HOSPITAL ENCOUNTER (OUTPATIENT)
Dept: RADIOLOGY | Facility: MEDICAL CENTER | Age: 75
End: 2025-04-09
Payer: MEDICARE

## 2025-04-09 DIAGNOSIS — R63.4 UNINTENTIONAL WEIGHT LOSS: ICD-10-CM

## 2025-04-09 PROCEDURE — 71250 CT THORAX DX C-: CPT

## 2025-04-10 ENCOUNTER — RESULTS FOLLOW-UP (OUTPATIENT)
Dept: MEDICAL GROUP | Facility: PHYSICIAN GROUP | Age: 75
End: 2025-04-10
Payer: MEDICARE

## 2025-04-10 NOTE — TELEPHONE ENCOUNTER
----- Message from Nurse Practitioner ANY Beach sent at 4/10/2025 11:40 AM PDT -----  Please call patient and advise that her chest CT showed some small lung nodules but nothing overly concerning, I would like to have this repeated in 3 months. I will order this at her follow up     Thank you  Ortiz

## 2025-04-14 ENCOUNTER — TELEPHONE (OUTPATIENT)
Dept: MEDICAL GROUP | Facility: PHYSICIAN GROUP | Age: 75
End: 2025-04-14
Payer: MEDICARE

## 2025-04-14 NOTE — TELEPHONE ENCOUNTER
VOICEMAIL  1. Caller Name: Daughter of Fior Ray                      Call Back Number: [phone]    2. Message: Patient's daughter would like a call from Ortiz to go over the recent imaging for her mother on the chest abnormalities that were found. Patient's daughter also states that her mother had a severe allergic reaction to the medication that was recently given for her UTI symptoms.     3. Patient approves office to leave a detailed voicemail/MyChart message: N\A

## 2025-06-10 ENCOUNTER — OFFICE VISIT (OUTPATIENT)
Dept: MEDICAL GROUP | Facility: PHYSICIAN GROUP | Age: 75
End: 2025-06-10
Payer: MEDICARE

## 2025-06-10 ENCOUNTER — HOSPITAL ENCOUNTER (OUTPATIENT)
Facility: MEDICAL CENTER | Age: 75
End: 2025-06-10
Payer: MEDICARE

## 2025-06-10 ENCOUNTER — HOSPITAL ENCOUNTER (OUTPATIENT)
Dept: LAB | Facility: MEDICAL CENTER | Age: 75
End: 2025-06-10
Payer: MEDICARE

## 2025-06-10 VITALS
RESPIRATION RATE: 15 BRPM | SYSTOLIC BLOOD PRESSURE: 102 MMHG | HEIGHT: 64 IN | BODY MASS INDEX: 39.67 KG/M2 | TEMPERATURE: 99 F | WEIGHT: 232.4 LBS | OXYGEN SATURATION: 94 % | HEART RATE: 83 BPM | DIASTOLIC BLOOD PRESSURE: 64 MMHG

## 2025-06-10 DIAGNOSIS — R91.1 LUNG NODULE: ICD-10-CM

## 2025-06-10 DIAGNOSIS — R77.1 ELEVATED SERUM GLOBULIN LEVEL: ICD-10-CM

## 2025-06-10 DIAGNOSIS — R35.0 URINARY FREQUENCY: ICD-10-CM

## 2025-06-10 DIAGNOSIS — R60.9 EDEMA, UNSPECIFIED TYPE: ICD-10-CM

## 2025-06-10 DIAGNOSIS — R35.0 URINARY FREQUENCY: Primary | ICD-10-CM

## 2025-06-10 PROCEDURE — 82784 ASSAY IGA/IGD/IGG/IGM EACH: CPT

## 2025-06-10 PROCEDURE — 99214 OFFICE O/P EST MOD 30 MIN: CPT

## 2025-06-10 PROCEDURE — 84155 ASSAY OF PROTEIN SERUM: CPT

## 2025-06-10 PROCEDURE — 3078F DIAST BP <80 MM HG: CPT

## 2025-06-10 PROCEDURE — 87086 URINE CULTURE/COLONY COUNT: CPT

## 2025-06-10 PROCEDURE — 82785 ASSAY OF IGE: CPT

## 2025-06-10 PROCEDURE — 87186 SC STD MICRODIL/AGAR DIL: CPT

## 2025-06-10 PROCEDURE — 36415 COLL VENOUS BLD VENIPUNCTURE: CPT

## 2025-06-10 PROCEDURE — 86334 IMMUNOFIX E-PHORESIS SERUM: CPT

## 2025-06-10 PROCEDURE — 87077 CULTURE AEROBIC IDENTIFY: CPT

## 2025-06-10 PROCEDURE — 80053 COMPREHEN METABOLIC PANEL: CPT

## 2025-06-10 PROCEDURE — 84165 PROTEIN E-PHORESIS SERUM: CPT

## 2025-06-10 PROCEDURE — 3074F SYST BP LT 130 MM HG: CPT

## 2025-06-10 RX ORDER — DIAZEPAM 2 MG/1
TABLET ORAL
Qty: 2 TABLET | Refills: 0 | Status: SHIPPED | OUTPATIENT
Start: 2025-06-12 | End: 2025-06-14

## 2025-06-10 RX ORDER — FUROSEMIDE 20 MG/1
20 TABLET ORAL DAILY
Qty: 14 TABLET | Refills: 0 | Status: SHIPPED | OUTPATIENT
Start: 2025-06-10 | End: 2025-06-24

## 2025-06-10 RX ORDER — POTASSIUM CHLORIDE 1500 MG/1
20 TABLET, EXTENDED RELEASE ORAL DAILY
Qty: 14 TABLET | Refills: 0 | Status: SHIPPED | OUTPATIENT
Start: 2025-06-10

## 2025-06-10 ASSESSMENT — FIBROSIS 4 INDEX: FIB4 SCORE: 2.15

## 2025-06-11 LAB
ALBUMIN SERPL BCP-MCNC: 4 G/DL (ref 3.2–4.9)
ALBUMIN/GLOB SERPL: 1.1 G/DL
ALP SERPL-CCNC: 95 U/L (ref 30–99)
ALT SERPL-CCNC: 7 U/L (ref 2–50)
ANION GAP SERPL CALC-SCNC: 13 MMOL/L (ref 7–16)
AST SERPL-CCNC: 17 U/L (ref 12–45)
BILIRUB SERPL-MCNC: 0.3 MG/DL (ref 0.1–1.5)
BUN SERPL-MCNC: 26 MG/DL (ref 8–22)
CALCIUM ALBUM COR SERPL-MCNC: 9.4 MG/DL (ref 8.5–10.5)
CALCIUM SERPL-MCNC: 9.4 MG/DL (ref 8.5–10.5)
CHLORIDE SERPL-SCNC: 106 MMOL/L (ref 96–112)
CO2 SERPL-SCNC: 21 MMOL/L (ref 20–33)
CREAT SERPL-MCNC: 1.36 MG/DL (ref 0.5–1.4)
GFR SERPLBLD CREATININE-BSD FMLA CKD-EPI: 41 ML/MIN/1.73 M 2
GLOBULIN SER CALC-MCNC: 3.6 G/DL (ref 1.9–3.5)
GLUCOSE SERPL-MCNC: 103 MG/DL (ref 65–99)
POTASSIUM SERPL-SCNC: 4.5 MMOL/L (ref 3.6–5.5)
PROT SERPL-MCNC: 7.6 G/DL (ref 6–8.2)
SODIUM SERPL-SCNC: 140 MMOL/L (ref 135–145)

## 2025-06-11 NOTE — PROGRESS NOTES
"Verbal consent was acquired by the patient to use [employer] ambient listening note generation during this visit     Subjective:     HPI:   History of Present Illness  The patient is a 75-year-old female presenting for a follow-up appointment. She was treated for a suspected urinary tract infection (UTI) in March 2025 with Nitrofurantoin (Macrobid) and Fluconazole (Diflucan). She took macrobid for 3 days before developing a reaction and discontinuing the medication.     The patient reports experiencing severe nocturnal hyperhidrosis, which initially led to the bed being soaked and was mistaken for urinary incontinence. She has lost 3 pounds since her last visit, although her appetite remains unchanged. She has been exposed to secondhand smoke. She had a positive response to diazepam for sedation during a computed tomography (CT) scan.    The patient experiences urinary incontinence, for which she uses pads. She reports no associated pain or malodor, and the severity of the leakage varies. She expresses concern about the adverse effects of medication prescribed for overactive bladder and believes that the UTI persists. She completed 3 days of a 5-day antibiotic course and took 2 antifungal tablets. The urinary incontinence began following her hysterectomy.    Patient complains of right lower extremity lower extremity edema localized laterally that improves with compression and elevation. Hx of \"vein stripping\" to the RLE and has had prior knee surgery     PAST SURGICAL HISTORY:  - Hysterectomy  - Vein stripping  - Knee surgery    FAMILY HISTORY  Mother and brother had facial cancer.    HCC Gap Form    Last edited 06/11/25 17:00 PDT by ANY Nguyen         Assessment & Plan:     Problem List Items Addressed This Visit    None  Visit Diagnoses         Urinary frequency    -  Primary    Relevant Orders    POCT Urinalysis    URINE CULTURE(NEW)    Referral to Urogynecology      Lung nodule        Relevant " Medications    diazePAM (VALIUM) 2 MG Tab (Start on 6/12/2025)    Other Relevant Orders    CT-CHEST (THORAX) W/O      Elevated serum globulin level        Relevant Orders    IGA SERUM QUANT    IGE SERUM    IGG SERUM QUANT    IGM SERUM QUANT    PROTEIN ELECTROPHORESIS 24 HR URINE    SPEP W/REFLEX TO DAVID, A, G, M    Comp Metabolic Panel (Completed)      Edema, unspecified type        Relevant Medications    furosemide (LASIX) 20 MG Tab    potassium chloride SA (KDUR) 20 MEQ Tab CR              Assessment & Plan  1. Urinary incontinence: Likely overactive bladder post-hysterectomy.  - Discuss infection risk due to leakage.  - Referral to urogynecology for further evaluation.  - Collect urine sample for reculture.    2. Lung nodules: Six nodules, 2-6 mm.  - Weight loss of 3 pounds since last visit.  - Schedule repeat CT scan in 07/2025.  - Prescribe Valium for sedation.  - Order non-fasting labs for weight loss and night sweats.  - Monitor nodules for 2 years if unchanged; refer to lung nodule clinic if growth observed.    3. Edema: Increased swelling during day.  - Recommend compression stockings   - Prescribe diuretic for one week, potassium replacement tablets.  - Send prescription to pharmacy.    4. History of cancer to the nose (BCC): Concerns about recurrence due to family history.  - Referral to dermatologist for swelling and potential recurrence.  - Blood work prior to appointment.    5. CKD 3a: This is a chronic, stable medical condition. Continue to trend CMP, GFR last noted to be 50. Avoid nephrotoxins.     6. Body mass index is 39.89 kg/m². Patient does report loss of appetite, has lost an additional 3 lbs in the last several weeks. Continue to monitor.     Follow-up  - Follow up in 1 week.    Return in about 1 week (around 6/17/2025).    Health Maintenance: Orders placed as applicable to patient     Objective:     Exam:  Objective:  Vitals:    06/10/25 1423   BP: 102/64   Pulse: 83   Resp: 15   Temp: 37.2  °C (99 °F)   SpO2: 94%     Physical Exam  Physical Exam    Constitutional:       Appearance: Normal appearance.   Eyes:      Extraocular Movements: Extraocular movements intact.   Pulmonary:      Effort: Pulmonary effort is normal.   Neurological:      General: No focal deficit present.      Mental Status: She is alert and oriented to person, place, and time.   Psychiatric:         Mood and Affect: Mood normal.         Behavior: Behavior normal.       Please note that this dictation was created using voice recognition software. I have made every reasonable attempt to correct obvious errors, but I expect that there are errors of grammar and possibly content that I did not discover before finalizing the note.

## 2025-06-12 LAB
BACTERIA UR CULT: ABNORMAL
BACTERIA UR CULT: ABNORMAL
IGA SERPL-MCNC: 372 MG/DL (ref 68–408)
IGE SERPL-ACNC: 26 KU/L
IGG SERPL-MCNC: 1372 MG/DL (ref 768–1632)
IGM SERPL-MCNC: 114 MG/DL (ref 35–263)
SIGNIFICANT IND 70042: ABNORMAL
SITE SITE: ABNORMAL
SOURCE SOURCE: ABNORMAL

## 2025-06-15 LAB
ALBUMIN SERPL ELPH-MCNC: 3.53 G/DL (ref 3.75–5.01)
ALPHA1 GLOB SERPL ELPH-MCNC: 0.45 G/DL (ref 0.19–0.46)
ALPHA2 GLOB SERPL ELPH-MCNC: 0.99 G/DL (ref 0.48–1.05)
B-GLOBULIN SERPL ELPH-MCNC: 0.99 G/DL (ref 0.48–1.1)
GAMMA GLOB SERPL ELPH-MCNC: 1.35 G/DL (ref 0.62–1.51)
INTERPRETATION SERPL IFE-IMP: ABNORMAL
MONOCLON BAND OBS SERPL: ABNORMAL
MONOCLONAL PROTEIN NL11656: ABNORMAL G/DL
PATHOLOGY STUDY: ABNORMAL
PROT SERPL-MCNC: 7.3 G/DL (ref 6.3–8.2)

## 2025-06-16 ENCOUNTER — TELEPHONE (OUTPATIENT)
Dept: MEDICAL GROUP | Facility: PHYSICIAN GROUP | Age: 75
End: 2025-06-16
Payer: MEDICARE

## 2025-06-16 ENCOUNTER — RESULTS FOLLOW-UP (OUTPATIENT)
Dept: MEDICAL GROUP | Facility: PHYSICIAN GROUP | Age: 75
End: 2025-06-16
Payer: MEDICARE

## 2025-06-16 RX ORDER — SULFAMETHOXAZOLE AND TRIMETHOPRIM 800; 160 MG/1; MG/1
1 TABLET ORAL 2 TIMES DAILY
Qty: 28 TABLET | Refills: 0 | Status: CANCELLED | OUTPATIENT
Start: 2025-06-16 | End: 2025-06-30

## 2025-06-16 NOTE — TELEPHONE ENCOUNTER
Spoke to patient and gave her the results of her urnine culture per Ortiz. Confirmed pharmacy and instructed patient to take rx w/ food and to wear extra sunscreen while taking the medication. Patient verbalized understood

## 2025-06-17 ENCOUNTER — OFFICE VISIT (OUTPATIENT)
Dept: MEDICAL GROUP | Facility: PHYSICIAN GROUP | Age: 75
End: 2025-06-17
Payer: MEDICARE

## 2025-06-17 VITALS
OXYGEN SATURATION: 95 % | RESPIRATION RATE: 18 BRPM | HEIGHT: 64 IN | BODY MASS INDEX: 39.91 KG/M2 | DIASTOLIC BLOOD PRESSURE: 58 MMHG | WEIGHT: 233.8 LBS | HEART RATE: 74 BPM | SYSTOLIC BLOOD PRESSURE: 100 MMHG | TEMPERATURE: 98.3 F

## 2025-06-17 DIAGNOSIS — R77.1 ELEVATED SERUM GLOBULIN LEVEL: Primary | ICD-10-CM

## 2025-06-17 DIAGNOSIS — N30.90 CYSTITIS: Primary | ICD-10-CM

## 2025-06-17 PROCEDURE — 3074F SYST BP LT 130 MM HG: CPT

## 2025-06-17 PROCEDURE — 99214 OFFICE O/P EST MOD 30 MIN: CPT

## 2025-06-17 PROCEDURE — 3078F DIAST BP <80 MM HG: CPT

## 2025-06-17 RX ORDER — SULFAMETHOXAZOLE AND TRIMETHOPRIM 800; 160 MG/1; MG/1
1 TABLET ORAL 2 TIMES DAILY
Qty: 28 TABLET | Refills: 0 | Status: SHIPPED | OUTPATIENT
Start: 2025-06-17 | End: 2025-07-01

## 2025-06-17 ASSESSMENT — FIBROSIS 4 INDEX: FIB4 SCORE: 1.46

## 2025-06-17 NOTE — PROGRESS NOTES
Verbal consent was acquired by the patient to use [employer] ambient listening note generation during this visit     Subjective:     HPI:   History of Present Illness  The patient is a 75-year-old female presenting for follow-up on recent laboratory results. The laboratory findings indicated elevated globulin levels, restricted protein migration in the gamma region, and a faint IgG kappa band on immunofixation electrophoresis (DAVID) gel, potentially suggestive of early monoclonal gammopathy however potentially complicated by active urinary tract infection. A computed tomography (CT) scan of the chest revealed multiple small pulmonary nodules, the largest measuring 6 mm; a follow-up CT scan is scheduled for 07/07/2025.    The patient was diagnosed with a urinary tract infection (UTI), with the culture showing sensitivity to trimethoprim-sulfamethoxazole (Bactrim), which was prescribed on 06/16/2025 but has not yet been initiated. The patient reports diuretic-induced urinary urgency upon waking, with the diuretic course expected to conclude within a week as her lower extremity edema has improved.     The patient notes mild improvement in leg edema, which recurs by the afternoon, with the left leg remaining unaffected. The swelling is not bothersome, and the patient is accustomed to it, having a history of leg wrapping during pregnancies.    The patient carries diphenhydramine (Benadryl) for a mustard allergy that causes throat constriction, though no episodes of anaphylaxis have been reported. The patient does not possess an epinephrine auto-injector (EpiPen).    HCC Gap Form    Last edited 06/17/25 13:02 PDT by ANY Nguyen         Assessment & Plan:     Problem List Items Addressed This Visit    None  Visit Diagnoses         Elevated serum globulin level    -  Primary    Relevant Orders    PROTEIN ELECTROPHORESIS, UR RANDOM    Comp Metabolic Panel              Assessment & Plan  1. Urinary Tract  Infection: Acute, complicated. Failed macrobid after 3 days due to allergic reaction. Culture sensitive to Bactrim. Start Bactrim today; monitor for adverse reactions. Perform urine test post-antibiotic course.  - Start Bactrim today, rx sent to pharmacy yesterday.   - Monitor for adverse reactions  - Perform urine test post-antibiotic course    2. Pulmonary Nodules: Undiagnosed concern with uncertain prognosis.  CT chest shows multiple nodules, largest 6 mm. Follow-up CT scheduled for 07/07/2025.  - Follow-up CT scheduled for 07/07/2025  - Refer to Dr. Greenwood if follow-up CT raises concerns    3. Edema: Mild improvement with diuretic.  - Continue diuretic  - Monitor for further improvement  - Diuretic course ends in one week    4. Elevated serum globulin level. Chronic and ongoing   Obtain urine protein electrophoresis after antibiotics completed.     5. CKD 3a. Chronic and ongoing   GFR slightly worsened from prior, recheck in 2 weeks.     Follow-up  - Schedule appointment after CT scan on 07/07/2025    Return in about 3 weeks (around 7/8/2025).    Health Maintenance: Orders placed as applicable to patient     Objective:     Exam:  Objective:  Vitals:    06/17/25 1048   BP: 100/58   Pulse: 74   Resp: 18   Temp: 36.8 °C (98.3 °F)   SpO2: 95%     Physical Exam  Physical Exam    Constitutional:       Appearance: Normal appearance.   Eyes:      Extraocular Movements: Extraocular movements intact.   Pulmonary:      Effort: Pulmonary effort is normal.   Neurological:      General: No focal deficit present.      Mental Status: She is alert and oriented to person, place, and time.   Psychiatric:         Mood and Affect: Mood normal.         Behavior: Behavior normal.       Please note that this dictation was created using voice recognition software. I have made every reasonable attempt to correct obvious errors, but I expect that there are errors of grammar and possibly content that I did not discover before finalizing  the note.

## 2025-06-26 ENCOUNTER — TELEPHONE (OUTPATIENT)
Dept: MEDICAL GROUP | Facility: PHYSICIAN GROUP | Age: 75
End: 2025-06-26
Payer: MEDICARE

## 2025-06-26 NOTE — TELEPHONE ENCOUNTER
Patient called stating that after starting the new antibiotic that Ortiz prescribed has given her a rash that is itchy and caused problems swallowing. I notified patient that Ortiz is not in office on Thursdays and Fridays but that because of her swallowing troubles it would be fair for her to be seen right away either at Urgent Care or the Emergency Room. Patient verbalized understood

## 2025-06-30 ENCOUNTER — HOSPITAL ENCOUNTER (OUTPATIENT)
Dept: LAB | Facility: MEDICAL CENTER | Age: 75
DRG: 683 | End: 2025-06-30
Payer: MEDICARE

## 2025-06-30 DIAGNOSIS — R77.1 ELEVATED SERUM GLOBULIN LEVEL: ICD-10-CM

## 2025-06-30 LAB
ALBUMIN SERPL BCP-MCNC: 3.8 G/DL (ref 3.2–4.9)
ALBUMIN SERPL BCP-MCNC: 4 G/DL (ref 3.2–4.9)
ALBUMIN/GLOB SERPL: 1.1 G/DL
ALBUMIN/GLOB SERPL: 1.2 G/DL
ALP SERPL-CCNC: 78 U/L (ref 30–99)
ALP SERPL-CCNC: 78 U/L (ref 30–99)
ALT SERPL-CCNC: 8 U/L (ref 2–50)
ALT SERPL-CCNC: <5 U/L (ref 2–50)
ANION GAP SERPL CALC-SCNC: 13 MMOL/L (ref 7–16)
ANION GAP SERPL CALC-SCNC: 13 MMOL/L (ref 7–16)
AST SERPL-CCNC: 15 U/L (ref 12–45)
AST SERPL-CCNC: 15 U/L (ref 12–45)
BASOPHILS # BLD AUTO: 0.6 % (ref 0–1.8)
BASOPHILS # BLD: 0.07 K/UL (ref 0–0.12)
BILIRUB SERPL-MCNC: 0.2 MG/DL (ref 0.1–1.5)
BILIRUB SERPL-MCNC: 0.2 MG/DL (ref 0.1–1.5)
BUN SERPL-MCNC: 60 MG/DL (ref 8–22)
BUN SERPL-MCNC: 62 MG/DL (ref 8–22)
CALCIUM ALBUM COR SERPL-MCNC: 9.2 MG/DL (ref 8.5–10.5)
CALCIUM ALBUM COR SERPL-MCNC: 9.2 MG/DL (ref 8.5–10.5)
CALCIUM SERPL-MCNC: 9 MG/DL (ref 8.5–10.5)
CALCIUM SERPL-MCNC: 9.2 MG/DL (ref 8.5–10.5)
CHLORIDE SERPL-SCNC: 110 MMOL/L (ref 96–112)
CHLORIDE SERPL-SCNC: 111 MMOL/L (ref 96–112)
CO2 SERPL-SCNC: 14 MMOL/L (ref 20–33)
CO2 SERPL-SCNC: 15 MMOL/L (ref 20–33)
CREAT SERPL-MCNC: 5.24 MG/DL (ref 0.5–1.4)
CREAT SERPL-MCNC: 5.72 MG/DL (ref 0.5–1.4)
EOSINOPHIL # BLD AUTO: 0.93 K/UL (ref 0–0.51)
EOSINOPHIL NFR BLD: 8.4 % (ref 0–6.9)
ERYTHROCYTE [DISTWIDTH] IN BLOOD BY AUTOMATED COUNT: 54.3 FL (ref 35.9–50)
GFR SERPLBLD CREATININE-BSD FMLA CKD-EPI: 7 ML/MIN/1.73 M 2
GFR SERPLBLD CREATININE-BSD FMLA CKD-EPI: 8 ML/MIN/1.73 M 2
GLOBULIN SER CALC-MCNC: 3.3 G/DL (ref 1.9–3.5)
GLOBULIN SER CALC-MCNC: 3.4 G/DL (ref 1.9–3.5)
GLUCOSE SERPL-MCNC: 103 MG/DL (ref 65–99)
GLUCOSE SERPL-MCNC: 91 MG/DL (ref 65–99)
HCT VFR BLD AUTO: 35.6 % (ref 37–47)
HGB BLD-MCNC: 11.4 G/DL (ref 12–16)
IMM GRANULOCYTES # BLD AUTO: 0.03 K/UL (ref 0–0.11)
IMM GRANULOCYTES NFR BLD AUTO: 0.3 % (ref 0–0.9)
LYMPHOCYTES # BLD AUTO: 3.03 K/UL (ref 1–4.8)
LYMPHOCYTES NFR BLD: 27.4 % (ref 22–41)
MCH RBC QN AUTO: 30.1 PG (ref 27–33)
MCHC RBC AUTO-ENTMCNC: 32 G/DL (ref 32.2–35.5)
MCV RBC AUTO: 93.9 FL (ref 81.4–97.8)
MONOCYTES # BLD AUTO: 0.85 K/UL (ref 0–0.85)
MONOCYTES NFR BLD AUTO: 7.7 % (ref 0–13.4)
NEUTROPHILS # BLD AUTO: 6.14 K/UL (ref 1.82–7.42)
NEUTROPHILS NFR BLD: 55.6 % (ref 44–72)
NRBC # BLD AUTO: 0 K/UL
NRBC BLD-RTO: 0 /100 WBC (ref 0–0.2)
PLATELET # BLD AUTO: 194 K/UL (ref 164–446)
PMV BLD AUTO: 8.9 FL (ref 9–12.9)
POTASSIUM SERPL-SCNC: 6.5 MMOL/L (ref 3.6–5.5)
POTASSIUM SERPL-SCNC: 6.8 MMOL/L (ref 3.6–5.5)
PROT SERPL-MCNC: 7.2 G/DL (ref 6–8.2)
PROT SERPL-MCNC: 7.3 G/DL (ref 6–8.2)
RBC # BLD AUTO: 3.79 M/UL (ref 4.2–5.4)
SODIUM SERPL-SCNC: 137 MMOL/L (ref 135–145)
SODIUM SERPL-SCNC: 139 MMOL/L (ref 135–145)
WBC # BLD AUTO: 11.1 K/UL (ref 4.8–10.8)

## 2025-06-30 PROCEDURE — 85025 COMPLETE CBC W/AUTO DIFF WBC: CPT

## 2025-06-30 PROCEDURE — 93005 ELECTROCARDIOGRAM TRACING: CPT | Mod: TC | Performed by: STUDENT IN AN ORGANIZED HEALTH CARE EDUCATION/TRAINING PROGRAM

## 2025-06-30 PROCEDURE — 36415 COLL VENOUS BLD VENIPUNCTURE: CPT

## 2025-06-30 PROCEDURE — 99285 EMERGENCY DEPT VISIT HI MDM: CPT

## 2025-06-30 PROCEDURE — 80053 COMPREHEN METABOLIC PANEL: CPT

## 2025-06-30 PROCEDURE — 80053 COMPREHEN METABOLIC PANEL: CPT | Mod: 91

## 2025-06-30 PROCEDURE — 84156 ASSAY OF PROTEIN URINE: CPT

## 2025-06-30 ASSESSMENT — PAIN DESCRIPTION - PAIN TYPE: TYPE: ACUTE PAIN

## 2025-06-30 ASSESSMENT — FIBROSIS 4 INDEX: FIB4 SCORE: 1.21

## 2025-07-01 ENCOUNTER — APPOINTMENT (OUTPATIENT)
Dept: RADIOLOGY | Facility: MEDICAL CENTER | Age: 75
DRG: 683 | End: 2025-07-01
Attending: STUDENT IN AN ORGANIZED HEALTH CARE EDUCATION/TRAINING PROGRAM
Payer: MEDICARE

## 2025-07-01 ENCOUNTER — HOSPITAL ENCOUNTER (INPATIENT)
Facility: MEDICAL CENTER | Age: 75
LOS: 1 days | DRG: 683 | End: 2025-07-02
Attending: STUDENT IN AN ORGANIZED HEALTH CARE EDUCATION/TRAINING PROGRAM
Payer: MEDICARE

## 2025-07-01 DIAGNOSIS — N17.9 AKI (ACUTE KIDNEY INJURY) (HCC): ICD-10-CM

## 2025-07-01 DIAGNOSIS — E87.5 HYPERKALEMIA: Primary | ICD-10-CM

## 2025-07-01 DIAGNOSIS — N39.0 ACUTE UTI: ICD-10-CM

## 2025-07-01 PROBLEM — E78.5 HYPERLIPIDEMIA: Status: ACTIVE | Noted: 2025-07-01

## 2025-07-01 PROBLEM — R19.7 WATERY DIARRHEA: Status: ACTIVE | Noted: 2025-07-01

## 2025-07-01 PROBLEM — E87.20 METABOLIC ACIDOSIS: Status: ACTIVE | Noted: 2025-07-01

## 2025-07-01 LAB
ANION GAP SERPL CALC-SCNC: 11 MMOL/L (ref 7–16)
ANION GAP SERPL CALC-SCNC: 12 MMOL/L (ref 7–16)
ANION GAP SERPL CALC-SCNC: 12 MMOL/L (ref 7–16)
ANION GAP SERPL CALC-SCNC: 13 MMOL/L (ref 7–16)
APPEARANCE UR: CLEAR
BACTERIA #/AREA URNS HPF: ABNORMAL /HPF
BASE EXCESS BLDV CALC-SCNC: -10 MMOL/L (ref -2–3)
BILIRUB UR QL STRIP.AUTO: NEGATIVE
BODY TEMPERATURE: 36.6 CENTIGRADE
BUN SERPL-MCNC: 46 MG/DL (ref 8–22)
BUN SERPL-MCNC: 50 MG/DL (ref 8–22)
BUN SERPL-MCNC: 54 MG/DL (ref 8–22)
BUN SERPL-MCNC: 58 MG/DL (ref 8–22)
CALCIUM SERPL-MCNC: 9 MG/DL (ref 8.5–10.5)
CALCIUM SERPL-MCNC: 9 MG/DL (ref 8.5–10.5)
CALCIUM SERPL-MCNC: 9.2 MG/DL (ref 8.5–10.5)
CALCIUM SERPL-MCNC: 9.3 MG/DL (ref 8.5–10.5)
CASTS URNS QL MICRO: ABNORMAL /LPF (ref 0–2)
CHLORIDE SERPL-SCNC: 109 MMOL/L (ref 96–112)
CHLORIDE SERPL-SCNC: 110 MMOL/L (ref 96–112)
CHLORIDE SERPL-SCNC: 110 MMOL/L (ref 96–112)
CHLORIDE SERPL-SCNC: 111 MMOL/L (ref 96–112)
CO2 SERPL-SCNC: 16 MMOL/L (ref 20–33)
CO2 SERPL-SCNC: 18 MMOL/L (ref 20–33)
COLOR UR: YELLOW
CREAT SERPL-MCNC: 3.8 MG/DL (ref 0.5–1.4)
CREAT SERPL-MCNC: 4.02 MG/DL (ref 0.5–1.4)
CREAT SERPL-MCNC: 4.48 MG/DL (ref 0.5–1.4)
CREAT SERPL-MCNC: 5.26 MG/DL (ref 0.5–1.4)
EKG IMPRESSION: NORMAL
EPITHELIAL CELLS 1715: ABNORMAL /HPF (ref 0–5)
GFR SERPLBLD CREATININE-BSD FMLA CKD-EPI: 10 ML/MIN/1.73 M 2
GFR SERPLBLD CREATININE-BSD FMLA CKD-EPI: 11 ML/MIN/1.73 M 2
GFR SERPLBLD CREATININE-BSD FMLA CKD-EPI: 12 ML/MIN/1.73 M 2
GFR SERPLBLD CREATININE-BSD FMLA CKD-EPI: 8 ML/MIN/1.73 M 2
GLUCOSE BLD STRIP.AUTO-MCNC: 111 MG/DL (ref 65–99)
GLUCOSE BLD STRIP.AUTO-MCNC: 87 MG/DL (ref 65–99)
GLUCOSE SERPL-MCNC: 101 MG/DL (ref 65–99)
GLUCOSE SERPL-MCNC: 115 MG/DL (ref 65–99)
GLUCOSE SERPL-MCNC: 117 MG/DL (ref 65–99)
GLUCOSE SERPL-MCNC: 124 MG/DL (ref 65–99)
GLUCOSE UR STRIP.AUTO-MCNC: NEGATIVE MG/DL
HCO3 BLDV-SCNC: 14 MMOL/L (ref 22–29)
KETONES UR STRIP.AUTO-MCNC: NEGATIVE MG/DL
LEUKOCYTE ESTERASE UR QL STRIP.AUTO: ABNORMAL
MICRO URNS: ABNORMAL
NITRITE UR QL STRIP.AUTO: NEGATIVE
PCO2 BLDV: 28.5 MMHG (ref 38–54)
PCO2 TEMP ADJ BLDV: 28 MMHG (ref 38–54)
PH BLDV: 7.3 [PH] (ref 7.31–7.45)
PH TEMP ADJ BLDV: 7.3 [PH] (ref 7.31–7.45)
PH UR STRIP.AUTO: 8 [PH] (ref 5–8)
PO2 BLDV: 42.5 MMHG (ref 23–48)
PO2 TEMP ADJ BLDV: 41.3 MMHG (ref 23–48)
POTASSIUM SERPL-SCNC: 5.3 MMOL/L (ref 3.6–5.5)
POTASSIUM SERPL-SCNC: 5.4 MMOL/L (ref 3.6–5.5)
POTASSIUM SERPL-SCNC: 5.6 MMOL/L (ref 3.6–5.5)
POTASSIUM SERPL-SCNC: 6.1 MMOL/L (ref 3.6–5.5)
PROT UR QL STRIP: NEGATIVE MG/DL
RBC # URNS HPF: ABNORMAL /HPF (ref 0–2)
RBC UR QL AUTO: NEGATIVE
SAO2 % BLDV: 60 % (ref 60–85)
SODIUM SERPL-SCNC: 139 MMOL/L (ref 135–145)
SODIUM SERPL-SCNC: 139 MMOL/L (ref 135–145)
SODIUM SERPL-SCNC: 140 MMOL/L (ref 135–145)
SODIUM SERPL-SCNC: 140 MMOL/L (ref 135–145)
SP GR UR STRIP.AUTO: 1.01
UROBILINOGEN UR STRIP.AUTO-MCNC: 0.2 EU/DL
WBC #/AREA URNS HPF: ABNORMAL /HPF

## 2025-07-01 PROCEDURE — A9270 NON-COVERED ITEM OR SERVICE: HCPCS | Performed by: INTERNAL MEDICINE

## 2025-07-01 PROCEDURE — A9270 NON-COVERED ITEM OR SERVICE: HCPCS | Performed by: STUDENT IN AN ORGANIZED HEALTH CARE EDUCATION/TRAINING PROGRAM

## 2025-07-01 PROCEDURE — 700105 HCHG RX REV CODE 258

## 2025-07-01 PROCEDURE — 71045 X-RAY EXAM CHEST 1 VIEW: CPT

## 2025-07-01 PROCEDURE — 82803 BLOOD GASES ANY COMBINATION: CPT

## 2025-07-01 PROCEDURE — 700105 HCHG RX REV CODE 258: Performed by: INTERNAL MEDICINE

## 2025-07-01 PROCEDURE — 700111 HCHG RX REV CODE 636 W/ 250 OVERRIDE (IP)

## 2025-07-01 PROCEDURE — 700111 HCHG RX REV CODE 636 W/ 250 OVERRIDE (IP): Performed by: INTERNAL MEDICINE

## 2025-07-01 PROCEDURE — 700101 HCHG RX REV CODE 250: Performed by: STUDENT IN AN ORGANIZED HEALTH CARE EDUCATION/TRAINING PROGRAM

## 2025-07-01 PROCEDURE — 94640 AIRWAY INHALATION TREATMENT: CPT

## 2025-07-01 PROCEDURE — 82962 GLUCOSE BLOOD TEST: CPT | Performed by: STUDENT IN AN ORGANIZED HEALTH CARE EDUCATION/TRAINING PROGRAM

## 2025-07-01 PROCEDURE — 99291 CRITICAL CARE FIRST HOUR: CPT | Mod: AI,GC

## 2025-07-01 PROCEDURE — 770020 HCHG ROOM/CARE - TELE (206)

## 2025-07-01 PROCEDURE — 700102 HCHG RX REV CODE 250 W/ 637 OVERRIDE(OP)

## 2025-07-01 PROCEDURE — 700102 HCHG RX REV CODE 250 W/ 637 OVERRIDE(OP): Performed by: INTERNAL MEDICINE

## 2025-07-01 PROCEDURE — A9270 NON-COVERED ITEM OR SERVICE: HCPCS

## 2025-07-01 PROCEDURE — 80048 BASIC METABOLIC PNL TOTAL CA: CPT | Mod: 91

## 2025-07-01 PROCEDURE — 74176 CT ABD & PELVIS W/O CONTRAST: CPT

## 2025-07-01 PROCEDURE — 81001 URINALYSIS AUTO W/SCOPE: CPT

## 2025-07-01 PROCEDURE — 700105 HCHG RX REV CODE 258: Performed by: STUDENT IN AN ORGANIZED HEALTH CARE EDUCATION/TRAINING PROGRAM

## 2025-07-01 PROCEDURE — 700111 HCHG RX REV CODE 636 W/ 250 OVERRIDE (IP): Mod: JZ | Performed by: STUDENT IN AN ORGANIZED HEALTH CARE EDUCATION/TRAINING PROGRAM

## 2025-07-01 PROCEDURE — 82962 GLUCOSE BLOOD TEST: CPT

## 2025-07-01 PROCEDURE — 700102 HCHG RX REV CODE 250 W/ 637 OVERRIDE(OP): Performed by: STUDENT IN AN ORGANIZED HEALTH CARE EDUCATION/TRAINING PROGRAM

## 2025-07-01 RX ORDER — SODIUM CHLORIDE, SODIUM LACTATE, POTASSIUM CHLORIDE, CALCIUM CHLORIDE 600; 310; 30; 20 MG/100ML; MG/100ML; MG/100ML; MG/100ML
1000 INJECTION, SOLUTION INTRAVENOUS ONCE
Status: COMPLETED | OUTPATIENT
Start: 2025-07-01 | End: 2025-07-01

## 2025-07-01 RX ORDER — SODIUM CHLORIDE 9 MG/ML
INJECTION, SOLUTION INTRAVENOUS CONTINUOUS
Status: DISCONTINUED | OUTPATIENT
Start: 2025-07-01 | End: 2025-07-01

## 2025-07-01 RX ORDER — SODIUM BICARBONATE 650 MG/1
650 TABLET ORAL 3 TIMES DAILY
Status: DISCONTINUED | OUTPATIENT
Start: 2025-07-01 | End: 2025-07-02

## 2025-07-01 RX ORDER — LABETALOL HYDROCHLORIDE 5 MG/ML
10 INJECTION, SOLUTION INTRAVENOUS EVERY 4 HOURS PRN
Status: DISCONTINUED | OUTPATIENT
Start: 2025-07-01 | End: 2025-07-02 | Stop reason: HOSPADM

## 2025-07-01 RX ORDER — ALBUTEROL SULFATE 5 MG/ML
10 SOLUTION RESPIRATORY (INHALATION)
Status: COMPLETED | OUTPATIENT
Start: 2025-07-01 | End: 2025-07-01

## 2025-07-01 RX ORDER — SODIUM BICARBONATE IN D5W 150/1000ML
PLASTIC BAG, INJECTION (ML) INTRAVENOUS CONTINUOUS
Status: DISCONTINUED | OUTPATIENT
Start: 2025-07-01 | End: 2025-07-02

## 2025-07-01 RX ORDER — DEXTROSE MONOHYDRATE 25 G/50ML
25 INJECTION, SOLUTION INTRAVENOUS ONCE
Status: COMPLETED | OUTPATIENT
Start: 2025-07-01 | End: 2025-07-01

## 2025-07-01 RX ORDER — ASPIRIN 81 MG/1
81 TABLET ORAL EVERY MORNING
Status: DISCONTINUED | OUTPATIENT
Start: 2025-07-01 | End: 2025-07-02 | Stop reason: HOSPADM

## 2025-07-01 RX ORDER — ACETAMINOPHEN 325 MG/1
650 TABLET ORAL EVERY 6 HOURS PRN
Status: DISCONTINUED | OUTPATIENT
Start: 2025-07-01 | End: 2025-07-02 | Stop reason: HOSPADM

## 2025-07-01 RX ORDER — FUROSEMIDE 20 MG/1
20 TABLET ORAL 2 TIMES DAILY
Status: ON HOLD | COMMUNITY
End: 2025-07-02

## 2025-07-01 RX ORDER — CALCIUM GLUCONATE 20 MG/ML
2 INJECTION, SOLUTION INTRAVENOUS ONCE
Status: COMPLETED | OUTPATIENT
Start: 2025-07-01 | End: 2025-07-01

## 2025-07-01 RX ORDER — POLYETHYLENE GLYCOL 3350 17 G/17G
1 POWDER, FOR SOLUTION ORAL
Status: DISCONTINUED | OUTPATIENT
Start: 2025-07-01 | End: 2025-07-01

## 2025-07-01 RX ORDER — ATORVASTATIN CALCIUM 10 MG/1
10 TABLET, FILM COATED ORAL DAILY
Status: DISCONTINUED | OUTPATIENT
Start: 2025-07-01 | End: 2025-07-02 | Stop reason: HOSPADM

## 2025-07-01 RX ORDER — INDOMETHACIN 25 MG/1
50 CAPSULE ORAL ONCE
Status: COMPLETED | OUTPATIENT
Start: 2025-07-01 | End: 2025-07-01

## 2025-07-01 RX ORDER — HEPARIN SODIUM 5000 [USP'U]/ML
5000 INJECTION, SOLUTION INTRAVENOUS; SUBCUTANEOUS EVERY 8 HOURS
Status: DISCONTINUED | OUTPATIENT
Start: 2025-07-02 | End: 2025-07-02 | Stop reason: HOSPADM

## 2025-07-01 RX ORDER — CEFAZOLIN 2 G/1
2 INJECTION, POWDER, FOR SOLUTION INTRAMUSCULAR; INTRAVENOUS ONCE
Status: COMPLETED | OUTPATIENT
Start: 2025-07-01 | End: 2025-07-01

## 2025-07-01 RX ORDER — ENOXAPARIN SODIUM 100 MG/ML
30 INJECTION SUBCUTANEOUS DAILY
Status: DISCONTINUED | OUTPATIENT
Start: 2025-07-01 | End: 2025-07-01

## 2025-07-01 RX ORDER — SODIUM CHLORIDE, SODIUM LACTATE, POTASSIUM CHLORIDE, CALCIUM CHLORIDE 600; 310; 30; 20 MG/100ML; MG/100ML; MG/100ML; MG/100ML
INJECTION, SOLUTION INTRAVENOUS CONTINUOUS
Status: DISCONTINUED | OUTPATIENT
Start: 2025-07-01 | End: 2025-07-01

## 2025-07-01 RX ADMIN — SODIUM ZIRCONIUM CYCLOSILICATE 10 G: 10 POWDER, FOR SUSPENSION ORAL at 11:00

## 2025-07-01 RX ADMIN — SODIUM BICARBONATE 50 MEQ: 84 INJECTION INTRAVENOUS at 00:52

## 2025-07-01 RX ADMIN — ASPIRIN 81 MG: 81 TABLET, COATED ORAL at 05:17

## 2025-07-01 RX ADMIN — SODIUM BICARBONATE: 84 INJECTION, SOLUTION INTRAVENOUS at 23:54

## 2025-07-01 RX ADMIN — SODIUM BICARBONATE: 84 INJECTION, SOLUTION INTRAVENOUS at 11:59

## 2025-07-01 RX ADMIN — SODIUM BICARBONATE 650 MG: 650 TABLET ORAL at 08:06

## 2025-07-01 RX ADMIN — CALCIUM GLUCONATE 2 G: 20 INJECTION, SOLUTION INTRAVENOUS at 00:57

## 2025-07-01 RX ADMIN — SODIUM BICARBONATE 650 MG: 650 TABLET ORAL at 14:34

## 2025-07-01 RX ADMIN — SODIUM CHLORIDE, POTASSIUM CHLORIDE, SODIUM LACTATE AND CALCIUM CHLORIDE 1000 ML: 600; 310; 30; 20 INJECTION, SOLUTION INTRAVENOUS at 00:58

## 2025-07-01 RX ADMIN — SODIUM BICARBONATE 650 MG: 650 TABLET ORAL at 20:09

## 2025-07-01 RX ADMIN — SODIUM CHLORIDE, POTASSIUM CHLORIDE, SODIUM LACTATE AND CALCIUM CHLORIDE: 600; 310; 30; 20 INJECTION, SOLUTION INTRAVENOUS at 04:37

## 2025-07-01 RX ADMIN — SODIUM BICARBONATE 50 MEQ: 84 INJECTION INTRAVENOUS at 02:01

## 2025-07-01 RX ADMIN — CEFAZOLIN 2 G: 2 INJECTION, POWDER, FOR SOLUTION INTRAVENOUS at 02:01

## 2025-07-01 RX ADMIN — DEXTROSE MONOHYDRATE 25 G: 25 INJECTION, SOLUTION INTRAVENOUS at 00:51

## 2025-07-01 RX ADMIN — SODIUM ZIRCONIUM CYCLOSILICATE 10 G: 10 POWDER, FOR SUSPENSION ORAL at 02:01

## 2025-07-01 RX ADMIN — ATORVASTATIN CALCIUM 10 MG: 10 TABLET, FILM COATED ORAL at 05:17

## 2025-07-01 RX ADMIN — ENOXAPARIN SODIUM 30 MG: 100 INJECTION SUBCUTANEOUS at 04:39

## 2025-07-01 RX ADMIN — ALBUTEROL SULFATE 10 MG/HR: 2.5 SOLUTION RESPIRATORY (INHALATION) at 01:39

## 2025-07-01 RX ADMIN — INSULIN HUMAN 5 UNITS: 100 INJECTION, SOLUTION PARENTERAL at 00:51

## 2025-07-01 SDOH — ECONOMIC STABILITY: TRANSPORTATION INSECURITY
IN THE PAST 12 MONTHS, HAS LACK OF RELIABLE TRANSPORTATION KEPT YOU FROM MEDICAL APPOINTMENTS, MEETINGS, WORK OR FROM GETTING THINGS NEEDED FOR DAILY LIVING?: NO

## 2025-07-01 ASSESSMENT — COGNITIVE AND FUNCTIONAL STATUS - GENERAL
MOVING FROM LYING ON BACK TO SITTING ON SIDE OF FLAT BED: A LITTLE
SUGGESTED CMS G CODE MODIFIER MOBILITY: CJ
MOBILITY SCORE: 21
TURNING FROM BACK TO SIDE WHILE IN FLAT BAD: A LITTLE
MOVING TO AND FROM BED TO CHAIR: A LITTLE
DAILY ACTIVITIY SCORE: 24
SUGGESTED CMS G CODE MODIFIER DAILY ACTIVITY: CH

## 2025-07-01 ASSESSMENT — ENCOUNTER SYMPTOMS
DEPRESSION: 0
BACK PAIN: 1
CONSTIPATION: 0
BLOOD IN STOOL: 0
VOMITING: 1
HEARTBURN: 0
CHILLS: 0
SHORTNESS OF BREATH: 0
NAUSEA: 0
HEADACHES: 0
ORTHOPNEA: 0
COUGH: 0
BLURRED VISION: 0
DIZZINESS: 0
SPUTUM PRODUCTION: 0
ABDOMINAL PAIN: 0
DIARRHEA: 1
FEVER: 0

## 2025-07-01 ASSESSMENT — SOCIAL DETERMINANTS OF HEALTH (SDOH)
WITHIN THE LAST YEAR, HAVE YOU BEEN HUMILIATED OR EMOTIONALLY ABUSED IN OTHER WAYS BY YOUR PARTNER OR EX-PARTNER?: NO
WITHIN THE PAST 12 MONTHS, YOU WORRIED THAT YOUR FOOD WOULD RUN OUT BEFORE YOU GOT THE MONEY TO BUY MORE: NEVER TRUE
WITHIN THE LAST YEAR, HAVE TO BEEN RAPED OR FORCED TO HAVE ANY KIND OF SEXUAL ACTIVITY BY YOUR PARTNER OR EX-PARTNER?: NO
WITHIN THE LAST YEAR, HAVE YOU BEEN KICKED, HIT, SLAPPED, OR OTHERWISE PHYSICALLY HURT BY YOUR PARTNER OR EX-PARTNER?: NO
IN THE PAST 12 MONTHS, HAS THE ELECTRIC, GAS, OIL, OR WATER COMPANY THREATENED TO SHUT OFF SERVICE IN YOUR HOME?: NO
WITHIN THE LAST YEAR, HAVE YOU BEEN AFRAID OF YOUR PARTNER OR EX-PARTNER?: NO
WITHIN THE PAST 12 MONTHS, THE FOOD YOU BOUGHT JUST DIDN'T LAST AND YOU DIDN'T HAVE MONEY TO GET MORE: NEVER TRUE

## 2025-07-01 ASSESSMENT — PAIN DESCRIPTION - PAIN TYPE: TYPE: ACUTE PAIN

## 2025-07-01 ASSESSMENT — FIBROSIS 4 INDEX: FIB4 SCORE: 2.73

## 2025-07-01 NOTE — ED PROVIDER NOTES
"ER Provider Note    Scribed for JEFF Florentino* by El Cazares. 7/1/2025  12:26 AM    Primary Care Provider: ANY Nguyen    CHIEF COMPLAINT  Chief Complaint   Patient presents with    Abnormal Labs     Pt came in by walk in, pt was called in by her PCP regarding abnormal lab taken this morning,   Potassium 6.8     EXTERNAL RECORDS REVIEWED  Reviewed family medicine note form June 17th where the patient was seen and had follow up for labs. She has a medical history of CKD.     HPI/ROS  LIMITATION TO HISTORY   Select: : None    OUTSIDE HISTORIAN(S):  Significant other present at bedside    Fior Ray is a 75 y.o. female who presents to the ED for evaluation of abnormal labs onset yesterday morning. The patient explains that she received routine lab work yesterday morning which later resulted and was significant for a potassium of 6.8 and kidney insufficiency. In response to this the patient's primary care physician called the patient tonight and advised that they present to the ED for evaluation. The patient states an associated vomiting incident yesterday, decreased urination the past few days, left sided lower back pain, and diarrhea 2-3 times a day recently. She denies any blood being present in her vomit or diarrhea. Patient denies taking any over the counter medications prior to arrival. She reports being medicated with daily blood pressure medication and an unknown medication which \"starts with an A\". Furthermore, the patient adds that she was provided with a 5 day course of Lasix last week and an antibiotics last week for a UTI and possible cellulitis. Patient notes her antibiotic course was set for 14 days but she states only taking 7 doses before ceasing the medication. She has a medical history of a heart problem and the patient denies being followed by a nephrologist. Denies any drug or alcohol use.     PAST MEDICAL HISTORY  Past Medical History:   Diagnosis Date    " Acute non-recurrent frontal sinusitis 09/22/2022    Cancer (HCC) 2012    skin    Dental disorder 09/11/2019    upper denture    Diverticulitis     Hypertension     Morbid obesity (HCC)     Obesity     TOMI (obstructive sleep apnea)     PE (pulmonary thromboembolism) ([facility])     Urinary bladder disorder     Urinary incontinence 09/11/2019    Venous insufficiency of both lower extremities     VSD (ventricular septal defect and aortic arch hypoplasia      SURGICAL HISTORY  Past Surgical History:   Procedure Laterality Date    UMBILICAL HERNIA REPAIR  9/17/2019    Procedure: REPAIR, HERNIA, UMBILICAL- INCARCERATED;  Surgeon: Truong Daniels M.D.;  Location: SURGERY SAME DAY [facility];  Service: General    KNEE ARTHROPLASTY TOTAL Right 2014    HYSTERECTOMY, VAGINAL      VEIN STRIPPING       FAMILY HISTORY  Family History   Problem Relation Age of Onset    Lung Disease Mother         COPD     Heart Attack Father     No Known Problems Sister     No Known Problems Brother     No Known Problems Maternal Grandmother     No Known Problems Maternal Grandfather     No Known Problems Paternal Grandmother     Heart Attack Paternal Grandfather     Heart Disease Daughter     No Known Problems Daughter     Heart Disease Son      SOCIAL HISTORY   reports that she has never smoked. She has never used smokeless tobacco. She reports that she does not drink alcohol and does not use drugs.    CURRENT MEDICATIONS  Previous Medications    ASPIRIN 81 MG EC TABLET    Take 81 mg by mouth every morning.    ATORVASTATIN (LIPITOR) 10 MG TAB    Take 1 Tablet by mouth every evening.    CALCIUM CARBONATE (CALCIUM 500 PO)    Take  by mouth.    FLUCONAZOLE (DIFLUCAN) 150 MG TABLET    Take 1 Tablet by mouth every 72 hours.    LISINOPRIL (PRINIVIL) 30 MG TABLET    Take 1 Tablet by mouth every day.    POTASSIUM CHLORIDE SA (KDUR) 20 MEQ TAB CR    Take 1 Tablet by mouth every day.    SULFAMETHOXAZOLE-TRIMETHOPRIM (BACTRIM DS) 800-160 MG TABLET    Take 1  "Tablet by mouth 2 times a day for 14 days.    VITAMIN D2, ERGOCALCIFEROL, (DRISDOL) 1.25 MG (37013 UT) CAP CAPSULE    Take 1 Capsule by mouth every 7 days.     ALLERGIES  Mustard [allyl isothiocyanate], Prochlorperazine edisylate [compazine], Doxycycline, Diflucan [fluconazole], and Macrobid [nitrofurantoin]    PHYSICAL EXAM  /62   Pulse 85   Temp 36.2 °C (97.2 °F) (Temporal)   Resp 16   Ht 1.57 m (5' 1.81\")   Wt 102 kg (224 lb 13.9 oz)   SpO2 92%   BMI 41.38 kg/m²     Constitutional: Alert in no apparent distress.  HENT: No signs of trauma, Bilateral external ears normal, Nose normal. Dry mucous membranes.   Eyes: Pupils are equal and reactive, Conjunctiva normal, Non-icteric.   Neck: Normal range of motion, No tenderness, Supple, No stridor.   Cardiovascular: Regular rate and rhythm, no murmurs.   Thorax & Lungs: Normal breath sounds, No respiratory distress, No wheezing, No chest tenderness.   Abdomen: Bowel sounds normal, Soft, No tenderness, No masses, No pulsatile masses.   Skin: Warm, Dry, No erythema, No rash.   Back: No bony tenderness. Mild left sided CVA tenderness.   Extremities: Intact distal pulses, No edema, No tenderness, No cyanosis  Musculoskeletal: Good range of motion in all major joints. No tenderness to palpation or major deformities noted.   Neurologic: Alert , Normal motor function, Normal sensory function, No focal deficits noted.     DIAGNOSTIC STUDIES & PROCEDURES    Labs:   Results for orders placed or performed during the hospital encounter of 07/01/25   CBC WITH DIFFERENTIAL    Collection Time: 06/30/25 11:26 PM   Result Value Ref Range    WBC 11.1 (H) 4.8 - 10.8 K/uL    RBC 3.79 (L) 4.20 - 5.40 M/uL    Hemoglobin 11.4 (L) 12.0 - 16.0 g/dL    Hematocrit 35.6 (L) 37.0 - 47.0 %    MCV 93.9 81.4 - 97.8 fL    MCH 30.1 27.0 - 33.0 pg    MCHC 32.0 (L) 32.2 - 35.5 g/dL    RDW 54.3 (H) 35.9 - 50.0 fL    Platelet Count 194 164 - 446 K/uL    MPV 8.9 (L) 9.0 - 12.9 fL    " Neutrophils-Polys 55.60 44.00 - 72.00 %    Lymphocytes 27.40 22.00 - 41.00 %    Monocytes 7.70 0.00 - 13.40 %    Eosinophils 8.40 (H) 0.00 - 6.90 %    Basophils 0.60 0.00 - 1.80 %    Immature Granulocytes 0.30 0.00 - 0.90 %    Nucleated RBC 0.00 0.00 - 0.20 /100 WBC    Neutrophils (Absolute) 6.14 1.82 - 7.42 K/uL    Lymphs (Absolute) 3.03 1.00 - 4.80 K/uL    Monos (Absolute) 0.85 0.00 - 0.85 K/uL    Eos (Absolute) 0.93 (H) 0.00 - 0.51 K/uL    Baso (Absolute) 0.07 0.00 - 0.12 K/uL    Immature Granulocytes (abs) 0.03 0.00 - 0.11 K/uL    NRBC (Absolute) 0.00 K/uL   Comp Metabolic Panel    Collection Time: 25 11:26 PM   Result Value Ref Range    Sodium 137 135 - 145 mmol/L    Potassium 6.5 (H) 3.6 - 5.5 mmol/L    Chloride 110 96 - 112 mmol/L    Co2 14 (L) 20 - 33 mmol/L    Anion Gap 13.0 7.0 - 16.0    Glucose 103 (H) 65 - 99 mg/dL    Bun 62 (H) 8 - 22 mg/dL    Creatinine 5.72 (HH) 0.50 - 1.40 mg/dL    Calcium 9.0 8.5 - 10.5 mg/dL    Correct Calcium 9.2 8.5 - 10.5 mg/dL    AST(SGOT) 15 12 - 45 U/L    ALT(SGPT) <5 2 - 50 U/L    Alkaline Phosphatase 78 30 - 99 U/L    Total Bilirubin 0.2 0.1 - 1.5 mg/dL    Albumin 3.8 3.2 - 4.9 g/dL    Total Protein 7.2 6.0 - 8.2 g/dL    Globulin 3.4 1.9 - 3.5 g/dL    A-G Ratio 1.1 g/dL   ESTIMATED GFR    Collection Time: 25 11:26 PM   Result Value Ref Range    GFR (CKD-EPI) 7 (A) >60 mL/min/1.73 m 2   EKG    Collection Time: 25 12:25 AM   Result Value Ref Range    Report       [facility] Emergency [facility].    Test Date:  2025  Pt Name:    EBONY FERNANDEZ            Department: ER  MRN:        [id number]                      Room:  Gender:     Female                       Technician: [id number]  :        [id number]                   Requested By:GWEN CONTRERAS  Order #:    [id number]                    Reading MD: Yo Jimenez    Measurements  Intervals                                Axis  Rate:       77                           P:           34  ND:         163                          QRS:        -52  QRSD:       115                          T:          47  QT:         381  QTc:        432    Interpretive Statements    Interpreted by me: Sinus rhythm, rate 77, stable low voltage, no signs of  acute ischemia when compared to prior, no hyperkalemic changes  Electronically Signed On 07- 00:25:57 PDT by Yo Jimenez     POCT glucose device results    Collection Time: 07/01/25 12:38 AM   Result Value Ref Range    POC Glucose, Blood 111 (H) 65 - 99 mg/dL   VENOUS BLOOD GAS    Collection Time: 07/01/25  1:00 AM   Result Value Ref Range    Venous Bg Ph 7.30 (L) 7.31 - 7.45    Venous Bg Ph Temp Corrected 7.30 (L) 7.31 - 7.45    Venous Bg Pco2 28.5 (L) 38.0 - 54.0 mmHg    Venous Bg Pco2 Temp Corrected 28.0 (L) 38.0 - 54.0 mmHg    Venous Bg Po2 42.5 23.0 - 48.0 mmHg    Venous Bg Po2 Temp Corrected 41.3 23.0 - 48.0 mmHg    Venous Bg O2 Saturation 60.0 60.0 - 85.0 %    Venous Bg Hco3 14 (L) 22 - 29 mmol/L    Venous Bg Base Excess -10 (L) -2 - 3 mmol/L    Body Temp 36.6 Centigrade   all labs reviewed by me.    EKG:   I have independently interpreted this EKG     Radiology:   The attending Emergency Physician has independently interpreted the diagnostic imaging associated with this visit and is awaiting the final reading from the radiologist, which will be displayed below.  Preliminary interpretation is a follows: No hydronephrosis  Radiologist interpretation:  DX-CHEST-PORTABLE (1 VIEW)   Final Result         1.  No acute cardiopulmonary disease.   2.  Cardiomegaly   3.  Atherosclerosis      CT-RENAL COLIC EVALUATION(A/P W/O)   Final Result         1.  Nondependent air within the bladder, consider recent catheterization or instrumentation, otherwise consider changes of cystitis.   2.  Diverticulosis.               COURSE & MEDICAL DECISION MAKING     INITIAL ASSESSMENT AND PLAN  Care Narrative:       12:26 AM - Patient seen and evaluated at bedside.   Patient presenting with abnormal labs.  Patient was seen by primary care provider last week started on Lasix for peripheral edema as well as potassium supplementation and Bactrim for suspected UTI.  Repeat blood work from 6/30 shows ALEN with moderate hyperkalemia, metabolic acidosis.  Fortunately patient does not appear to be symptomatic has no extremity weakness, no EKG changes.  Given patient's acidosis and degree of hyperkalemia we will treat with calcium, bicarb, dextrose, insulin and albuterol.  Patient reportedly has been urinating but less than usual.  Reviewed urine culture from 6/10 patient is growing E. coli sensitive to cefazolin.  Suspect ALEN in the setting of dehydration, recently prescribed Bactrim and potassium supplementation as well as chronic use of ACE inhibitor.  Will obtain CT to assess for obstructive process.  Will closely monitor patient's urination while we resuscitate with  isotonic fluid.    1:06 AM - Paged nephrology.     1:29 AM Spoke with Dr. Hung (Npehrology) about the patient's condition. He recommended starting Lokelma and additional Sodium Bicarb and wants to queue 4 hour BNP. Dr. Hung also believes the patient would be appropriate for hospitalization at telemetry.    Patient ordered for Lokelma and additional bicarb.  Spoke with hospitalist regarding admission.    HYDRATION: Based on the patient's presentation of Acute Diarrhea, Acute Vomiting, and Dehydration the patient was given IV fluids. IV Hydration was used because oral hydration was not adequate alone. Upon recheck following hydration, the patient was improved.               DISPOSITION AND DISCUSSIONS  I have discussed management of the patient with the following physicians and JOSE's: (Hospitalist)    Discussion of management with other QHP or appropriate source(s): None       Barriers to care at this time, including but not limited to: None.       DISPOSITION:  Patient will be hospitalized by (Hospitalist).     FINAL  IMPRESSION   1. Hyperkalemia    2. ALEN (acute kidney injury) (HCC)    3. Acute UTI      CRITICAL CARE  The very real possibility of a deterioration of this patient's condition required the highest level of my preparedness for sudden, emergent intervention.  I provided critical care services, which included medication orders, frequent reevaluations of the patient's condition and response to treatment, ordering and reviewing test results, and discussing the case with various consultants.  The critical care time associated with the care of the patient was 32 minutes. Review chart for interventions. This time is exclusive of any other billable procedures.      El BARKER (Scribe), am scribing for, and in the presence of, TERESA Salazar.    Electronically signed by: lE Cazares ([place]), 7/1/2025    Yo BARKER D* personally performed the services described in this documentation, as scribed by El Cazares in my presence, and it is both accurate and complete.    The note accurately reflects work and decisions made by me.  DARRIAN SalazarO.  7/1/2025  1:37 AM

## 2025-07-01 NOTE — CARE PLAN
The patient is Stable - Low risk of patient condition declining or worsening    Shift Goals  Clinical Goals: monitor labs, c.diff r/o    Progress made toward(s) clinical / shift goals:    Problem: Knowledge Deficit - Standard  Goal: Patient and family/care givers will demonstrate understanding of plan of care, disease process/condition, diagnostic tests and medications  Outcome: Progressing       06:45-19:15  Patient A&Ox4. Denies pain this morning. Stand by assist OOB. R/o c.diff; pending stool sample. Bed locked in lowest position with bed alarm activated. Call light within reach. Safety precautions maintained.

## 2025-07-01 NOTE — PROGRESS NOTES
Brief IM note    Patient was admitted earlier today for ALEN complicated by hyperkalemia, likely secondary to Lasix with Bactrim use and associated hypovolemia  Morning labs with 6.1 K, creatinine 4.48  Repeat K later today improved to 5.6. Crea 4.02, improving.     Continue lokelma daily.   Switch to bicarb drip.   Continue to monitor renal function.   Nephrology following.

## 2025-07-01 NOTE — PROGRESS NOTES
4 Eyes Skin Assessment Completed by RANGEL Yen and RANGEL Thapa.    Skin assessment is primarily focused on high risk bony prominences. Pay special attention to skin beneath and around medical devices, high risk bony prominences, skin to skin areas and areas where the patient lacks sensation to feel pain and areas where the patient previously had breakdown.     Head (Occipital):  WDL   Ears (Under Medical Devices): WDL   Nose (Under Medical Devices): WDL   Mouth:  Dentures upper   Neck: WDL   Breast/Chest:  WDL   Shoulder Blades:  WDL   Spine:   WDL   (R) Arm/Elbow/Hand: WDL   (L) Arm/Elbow/Hand: WDL   Abdomen: WDL   Pannus/Groin:  Red and Blanching   Sacrum/Coccyx:   Red and Blanching   (R) Ischial Tuberosity (Sit Bones):  Red and Blanching   (L) Ischial Tuberosity (Sit Bones):  Red and Blanching   (R) Leg:  Red and Blanching   (L) Leg:  Red and Blanching   (R) Heel:  WDL   (R) Foot/Toe: WDL   (L) Heel: WDL   (L) Foot/Toe:  WDL       DEVICES IN USE:   Respiratory Devices:  NA, patient on room air  Feeding Devices:  N/A   Lines & BP Monitoring Devices:  Peripheral IV and Pulse ox    Orthopedic Devices:  N/A  Miscellaneous Devices:  N/A    PROTOCOL INTERVENTIONS:   Standard/Trauma Bed:  Already in place    WOUND PHOTOS:   N/A no wounds identified    WOUND CONSULT:   N/A, no advanced wound care needs identified

## 2025-07-01 NOTE — ED TRIAGE NOTES
Chief Complaint   Patient presents with    Abnormal Labs     Pt came in by walk in, pt was called in by her PCP regarding abnormal lab taken this morning,   Potassium 6.8     Pt is awake and ambulatory  Denies chest pain or sob    Vitals:    06/30/25 2250   BP: 117/62   Pulse: 85   Resp: 16   Temp: 36.2 °C (97.2 °F)   SpO2: 92%

## 2025-07-01 NOTE — ASSESSMENT & PLAN NOTE
Likely 2/2 dehydration in the setting of diarrhea.   bicarb 14  VBG with metabolic acidosis with resp compensation.  S/p sodium bicarb in the ED  -Monitor BMP   -Continue IV fluids

## 2025-07-01 NOTE — PROGRESS NOTES
Monitor Summary  Rhythm: SR  Rate: 75  Ectopy: N/A  Measurements: .18/.06/.35  ---12 hr Chart Review---

## 2025-07-01 NOTE — ASSESSMENT & PLAN NOTE
Watery diarrhea x 3 days  -F/U on c diff, recent use of antibiotics, contact precautions.  -GI PCR panel pending  -Continue IV fluids

## 2025-07-01 NOTE — H&P
"[facility] Internal Medicine History & Physical Note    Date of Service  7/1/2025    [facility] Team: AYLEEN   Attending: Anthony Ang D.o.  Senior Resident: Dr. Reyes  Contact Number: [phone]    Primary Care Physician  ANY Nguyen    Consultants  nephrology      Code Status  Full Code    Chief Complaint  Chief Complaint   Patient presents with    Abnormal Labs     Pt came in by walk in, pt was called in by her PCP regarding abnormal lab taken this morning,   Potassium 6.8       History of Presenting Illness (HPI):   Fior Ray is a 75 y.o. female with a PMHx of HTN, Hx of DVT/PE s/p IVC filter, obesity, HLD, CKD Stage 3A, very small VSD and TOMI  who presented 7/1/2025 for evaluation of abnormal labs completed yesterday. She received a call from her PCP last night who advised her to present to the ED. Her potassium was 6.8 and had renal insufficiency. The patient has had vomiting since the past 2 days and watery diarrhea since 3 days, denies fever but feels \"cold\". She also reports wearing a pad to urinate which she changes regularly however hasn't had to change as much over the past 2 days. Also has left sided back pain, denies urinary symptoms including dysuria, abdominal pain. Notes oliguria compared to before. She states she was prescribed lasix last week for bilateral lower extremity swelling. She also was given a 14 day course of Bactrim for UTI however discontinued taking the medication 3-4 days ago due to rashes and severe itching. She does not follow nephrology, does not smoke, drink and has not traveled outside the states recently. Denies other systemic symptoms.    In the ED,  Afebrile, VSS on room air.  Labs with WBC 11.1, Hb 11.4, plt 194, K 6.5, bicarb 14, creatinine 5.72 (baseline 1.1), GFR 7, BUN 62, VBG with metabolic acidosis with resp compensation.  EKG with NSR, no hyperkalemic changes.  CXR with no acute cardiopulm disease, cardiomegaly, atherosclerosis.  CT Renal with " nondependent air within the bladder, consider recent catheterization or instrumentation, otherwise consider changes of cystitis. Diverticulosis.    She received albuterol, D50, insulin 5 units, 1 LR LR, sodium bicarb, calcium gluconate in the ED. Nephrology was consulted and recommended lokelma, Q4H BMP and they will see patient in the AM.  Admitted to medicine floors for ALEN on CKD Stage 3A and hyperkalemia.    I discussed the plan of care with patient and family.    Review of Systems  Review of Systems   Constitutional:  Negative for chills and fever.   Eyes:  Negative for blurred vision.   Respiratory:  Negative for cough, sputum production and shortness of breath.    Cardiovascular:  Negative for chest pain, orthopnea and leg swelling.   Gastrointestinal:  Positive for diarrhea and vomiting. Negative for abdominal pain, blood in stool, constipation, heartburn and nausea.   Genitourinary:  Positive for frequency (decreased). Negative for dysuria, hematuria and urgency.   Musculoskeletal:  Positive for back pain (left sided).   Skin:  Positive for rash. Negative for itching.   Neurological:  Negative for dizziness and headaches.   Psychiatric/Behavioral:  Negative for depression.        Past Medical History   has a past medical history of Acute non-recurrent frontal sinusitis (09/22/2022), Cancer ([facility]) (2012), Dental disorder (09/11/2019), Diverticulitis, Hypertension, Morbid obesity ([facility]), Obesity, TOMI (obstructive sleep apnea), PE (pulmonary thromboembolism) ([facility]), Urinary bladder disorder, Urinary incontinence (09/11/2019), Venous insufficiency of both lower extremities, and VSD (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    Surgical History   has a past surgical history that includes knee arthroplasty total (Right, 2014); hysterectomy, vaginal; vein stripping; and umbilical hernia repair (9/17/2019).     Family History  Family History   Problem Relation Age of Onset    Lung Disease Mother          COPD     Heart Attack Father     No Known Problems Sister     No Known Problems Brother     No Known Problems Maternal Grandmother     No Known Problems Maternal Grandfather     No Known Problems Paternal Grandmother     Heart Attack Paternal Grandfather     Heart Disease Daughter     No Known Problems Daughter     Heart Disease Son       Family history reviewed with patient.     Social History  Tobacco: No  Alcohol: No  Recreational drugs (illegal or prescription): No  Employment: -  Living Situation: -  Recent Travel: -  Primary Care Provider: Not Reviewed  Other (stressors, spirituality, exposures): -    Allergies  Allergies[1]    Medications  Prior to Admission Medications   Prescriptions Last Dose Informant Patient Reported? Taking?   Calcium Carbonate (CALCIUM 500 PO)   Yes No   Sig: Take  by mouth.   aspirin 81 MG EC tablet  Patient Yes No   Sig: Take 81 mg by mouth every morning.   atorvastatin (LIPITOR) 10 MG Tab   No No   Sig: Take 1 Tablet by mouth every evening.   lisinopril (PRINIVIL) 30 MG tablet   No No   Sig: Take 1 Tablet by mouth every day.   potassium chloride SA (KDUR) 20 MEQ Tab CR   No No   Sig: Take 1 Tablet by mouth every day.   sulfamethoxazole-trimethoprim (BACTRIM DS) 800-160 MG tablet   No No   Sig: Take 1 Tablet by mouth 2 times a day for 14 days.   vitamin D2, Ergocalciferol, (DRISDOL) 1.25 MG (53625 UT) Cap capsule   No No   Sig: Take 1 Capsule by mouth every 7 days.      Facility-Administered Medications: None       Physical Exam  Temp:  [36.2 °C (97.1 °F)-36.2 °C (97.2 °F)] 36.2 °C (97.1 °F)  Pulse:  [66-85] 77  Resp:  [16-18] 18  BP: ()/(53-65) 134/65  SpO2:  [92 %-98 %] 96 %  Blood Pressure : 106/56   Temperature: 36.2 °C (97.2 °F)   Pulse: 67   Respiration: 16   Pulse Oximetry: 98 %       Physical Exam  Vitals reviewed.   Constitutional:       Appearance: Normal appearance. She is obese.   HENT:      Head: Normocephalic.      Nose: Nose normal.      Mouth/Throat:      Mouth:  "Mucous membranes are moist.   Eyes:      Extraocular Movements: Extraocular movements intact.   Cardiovascular:      Rate and Rhythm: Normal rate and regular rhythm.      Pulses: Normal pulses.      Heart sounds: Normal heart sounds. No murmur heard.  Pulmonary:      Effort: Pulmonary effort is normal. No respiratory distress.      Breath sounds: No wheezing or rales.   Abdominal:      General: Bowel sounds are normal.      Palpations: Abdomen is soft.      Tenderness: There is no abdominal tenderness. There is no guarding or rebound.   Musculoskeletal:         General: Normal range of motion.      Cervical back: Normal range of motion.      Right lower leg: No edema.      Left lower leg: No edema.   Skin:     General: Skin is warm.   Neurological:      General: No focal deficit present.      Mental Status: She is alert and oriented to person, place, and time.   Psychiatric:         Mood and Affect: Mood normal.         Behavior: Behavior normal.         Laboratory:  Recent Labs     06/30/25  2326   WBC 11.1*   RBC 3.79*   HEMOGLOBIN 11.4*   HEMATOCRIT 35.6*   MCV 93.9   MCH 30.1   MCHC 32.0*   RDW 54.3*   PLATELETCT 194   MPV 8.9*     Recent Labs     06/30/25  1101 06/30/25  2326   SODIUM 139 137   POTASSIUM 6.8* 6.5*   CHLORIDE 111 110   CO2 15* 14*   GLUCOSE 91 103*   BUN 60* 62*   CREATININE 5.24* 5.72*   CALCIUM 9.2 9.0     Recent Labs     06/30/25  1101 06/30/25  2326   ALTSGPT 8 <5   ASTSGOT 15 15   ALKPHOSPHAT 78 78   TBILIRUBIN 0.2 0.2   GLUCOSE 91 103*         No results for input(s): \"NTPROBNP\" in the last 72 hours.      No results for input(s): \"TROPONINT\" in the last 72 hours.    Imaging:  DX-CHEST-PORTABLE (1 VIEW)   Final Result         1.  No acute cardiopulmonary disease.   2.  Cardiomegaly   3.  Atherosclerosis      CT-RENAL COLIC EVALUATION(A/P W/O)   Final Result         1.  Nondependent air within the bladder, consider recent catheterization or instrumentation, otherwise consider changes of " cystitis.   2.  Diverticulosis.                X-Ray:  I have personally reviewed the images and compared with prior images.  EKG:  I have personally reviewed the images and compared with prior images.    Assessment/Plan:  Problem Representation:   I anticipate this patient will require at least two midnights for appropriate medical management, necessitating inpatient admission because ALEN on CKD Stage 3A and hyperkalemia.    Patient will need a Telemetry bed on MEDICAL service .  The need is secondary to ALEN on CKD Stage 3A and hyperkalemia.    * Hyperkalemia- (present on admission)  Assessment & Plan  Labs completed per PCP with K 6.8  Potassium in ED 6.5, asymptomatic.  EKG with NSR, no hyperkalemic changes.  S/p D50, insulin 5 units, calcium gluconate in the ED.  -Monitor BMP Q4H  -Received 1 dose of lokelma, continue lokelma per nephrology  -Nephrology consult in the AM    ALEN (acute kidney injury) (HCC)  Assessment & Plan  ALEN on CKD Stage 3A   creatinine 5.72 (baseline 1.1), GFR 7, BUN 62  Oliguric  Likely 2/2 lasix vs bactrim vs dehydration 2/2 vomiting and diarrhea.  -Continuous LR 83 ml/hr  -Avoid nephrotoxins, NSAIDS, IV contrast  -Renal diet   -Dose all meds for eGFR <15  -Heparin for DVT PPx  -Nephrology consult in the AM    Hyperlipidemia  Assessment & Plan  -continue home atorvastatin    Watery diarrhea  Assessment & Plan  Watery diarrhea x 3 days  -F/U on c diff, recent use of antibiotics, contact precautions.  -GI PCR panel pending  -Continue IV fluids    Metabolic acidosis  Assessment & Plan  Likely 2/2 dehydration in the setting of diarrhea.   bicarb 14  VBG with metabolic acidosis with resp compensation.  S/p sodium bicarb in the ED  -Monitor BMP   -Continue IV fluids    Aortic atherosclerosis (HCC)- (present on admission)  Assessment & Plan  -Continue aspirin 81 mg daily        VTE prophylaxis: SCDs/TEDs and heparin ppx         [1]   Allergies  Allergen Reactions    Mustard [Allyl  Isothiocyanate] Anaphylaxis    Prochlorperazine Edisylate [Compazine] Anaphylaxis    Doxycycline Nausea and Unspecified     Causes headache and nausea    Diflucan [Fluconazole] Swelling    Macrobid [Nitrofurantoin] Swelling    Sulfa Drugs Hives and Rash     Itchy rash and hives

## 2025-07-01 NOTE — DIETARY
NUTRITION SERVICES: BMI - Pt with BMI >40 (=Body mass index is 42.35 kg/m².), class III obesity. Weight loss counseling not appropriate in acute care setting.     RECOMMEND - If appropriate at DC please refer to outpatient nutrition services for weight management.

## 2025-07-01 NOTE — ED NOTES
Break RN: pt medicated per mar. Given pillow and warm blanket for comfort. Denies any other needs at this time.

## 2025-07-01 NOTE — PROGRESS NOTES
Notified of critical CMP indicating acute renal failure and hyperkalemia. Patient called and advised to present to ER immediately and verbalized understanding. Called transfer center to notify of incoming arrival.

## 2025-07-01 NOTE — ED NOTES
Med Rec completed per patient      Allergies reviewed: yes     Oral antibiotics in the past 30 days: yes   Bactrim 800-125 mg BID 14 day course not completed. Patient took 7 days and developed itching and a rash so she stopped taking.   Added to allergy list.     Anticoagulant in past 14 days: no  Patient takes Aspirin 81 mg Daily. Last dose: 06/30/2025 am     Dispense history available in [facility]: partial    Pharmacy patient utilizes: [facility]   [phone]

## 2025-07-01 NOTE — PROGRESS NOTES
Tele Strip     Rate: 63-77bpm  Rhythm: SR  Ectopy: rare PVC  Measurements: 0.18/0.10/0.42

## 2025-07-01 NOTE — CONSULTS
"[facility] -  CONSULTATION NOTE    Date & Time:   7/1/2025  10:31 AM    Author:   Marcelo Jordan D.O.      REASON FOR CONSULTATION:   - ALEN/Management of acute and chronic conditions related to Nephrology      CHIEF COMPLAINT:   -  \"abnorma labs  \"      HISTORY OF PRESENT ILLNESS:    75Y  F w hx of HTN, DVT/PE s/p IVC filter, CKD3, TOMI presented after being called by PCM for abnormal labs. ER labs showed Cr of 5.2 and K of 6.8. Pt had been given bactrim for UTI for a 14 day course. She developed a rash and states she stopped about a week of taking it. Of note, pt also on lisinopril 30mg daily and KCL 20meq daily and enjoys eating tomatoes.   She also reporting some diarrhea over last few days.  Cr noted to be 1.36 on 6/10/25 prior to her ALEN  Pt was admitted, and given IVF and insulin and lokelma.     No Fever/Chills, no N/V, CP or SOB.  No melena, hematochezia, hematemesis.  No HA, visual changes, or abdominal pain. + diarrhea    REVIEW OF SYSTEMS:    10 point ROS was performed and is as per HPI or otherwise negative      PAST MEDICAL HISTORY:   Past Medical History:   Diagnosis Date    Acute non-recurrent frontal sinusitis 09/22/2022    Urinary incontinence 09/11/2019    Dental disorder 09/11/2019    upper denture    Cancer (HCC) 2012    skin    Diverticulitis     Hypertension     Morbid obesity (HCC)     Obesity     TOMI (obstructive sleep apnea)     PE (pulmonary thromboembolism) (HCC)     Urinary bladder disorder     Venous insufficiency of both lower extremities     VSD (ventricular septal defect and aortic arch hypoplasia           PAST SURGICAL HISTORY:   Past Surgical History[1]       FAMILY HISTORY:   Family History   Problem Relation Age of Onset    Lung Disease Mother         COPD     Heart Attack Father     No Known Problems Sister     No Known Problems Brother     No Known Problems Maternal Grandmother     No Known Problems Maternal Grandfather     No Known Problems " "Paternal Grandmother     Heart Attack Paternal Grandfather     Heart Disease Daughter     No Known Problems Daughter     Heart Disease Son        SOCIAL HISTORY:   Social History[2]         HOME MEDICATIONS: reviewed  Home Medications   Medication Sig Taking? Last Dose Authorizing Provider   Cholecalciferol (VITAMIN D-3 PO) Take 1 Capsule by mouth every day. Yes 6/30/2025 Morning Physician Outpatient   furosemide (LASIX) 20 MG Tab Take 20 mg by mouth 2 times a day. 14 day course completed Yes 6/24/2025 Physician Outpatient   atorvastatin (LIPITOR) 10 MG Tab Take 1 Tablet by mouth every evening. Yes 6/30/2025 Morning VALORIE NguyenP.RLeonardoN.   lisinopril (PRINIVIL) 30 MG tablet Take 1 Tablet by mouth every day. Yes 6/30/2025 Morning VALORIE NguyenP.RTAMARA.   aspirin 81 MG EC tablet Take 81 mg by mouth every morning. Yes 6/30/2025 Morning Nn Emergency Md Per Protocol, MBHAVESH   sulfamethoxazole-trimethoprim (BACTRIM DS) 800-160 MG tablet Take 1 Tablet by mouth 2 times a day for 14 days.  Patient not taking: Reported on 7/1/2025  Not Taking VALORIE NguyenP.R.ALEK.   potassium chloride SA (KDUR) 20 MEQ Tab CR Take 1 Tablet by mouth every day.  Patient not taking: Reported on 7/1/2025  Not Taking JOE Nguyen.P.R.N.       ALLERGIES:  Mustard [allyl isothiocyanate], Prochlorperazine edisylate [compazine], Doxycycline, Diflucan [fluconazole], Macrobid [nitrofurantoin], and Sulfa drugs      VITAL SIGNS:  /56   Pulse 71   Temp 36.6 °C (97.9 °F) (Temporal)   Resp 18   Ht 1.57 m (5' 1.81\")   Wt 104 kg (230 lb 2.6 oz)   SpO2 93%   BMI 42.35 kg/m²     Physical Exam  Constitutional:       Appearance: Normal appearance. She is not toxic-appearing.   HENT:      Head: Normocephalic and atraumatic.      Mouth/Throat:      Mouth: Mucous membranes are moist.   Cardiovascular:      Rate and Rhythm: Normal rate and regular rhythm.   Pulmonary:      Effort: Pulmonary effort is normal.      Breath sounds: Normal " breath sounds.   Musculoskeletal:      Right lower leg: No edema.      Left lower leg: No edema.   Skin:     General: Skin is warm and dry.   Neurological:      Mental Status: She is alert and oriented to person, place, and time. Mental status is at baseline.         Access:      FLUID BALANCE:  In: 1100 [I.V.:1000]  Out: -       LABS:  Recent Labs     06/30/25  1101 06/30/25  2326 07/01/25  0435   SODIUM 139 137 139   POTASSIUM 6.8* 6.5* 5.4   CHLORIDE 111 110 110   CO2 15* 14* 16*   GLUCOSE 91 103* 101*   BUN 60* 62* 58*   CREATININE 5.24* 5.72* 5.26*   CALCIUM 9.2 9.0 9.3      Recent Labs     06/30/25  1101 06/30/25 2326 07/01/25  0435   SODIUM 139 137 139   POTASSIUM 6.8* 6.5* 5.4   CHLORIDE 111 110 110   CO2 15* 14* 16*   GLUCOSE 91 103* 101*   BUN 60* 62* 58*   CREATININE 5.24* 5.72* 5.26*          IMAGING:  - Imaging studies reviewed      ASSESSMENTS:    -ALEN on CKD3, likely 2/2 bactrim and UTI    Cr 1.3 in 6/2025  -Hyperkalemia  -Acidosis  -HTN  -HLD  -Anemia    PLAN    -ALEN and K improving, no indication for RRT at this time  -continue lokelma 10gm daily, stop when K < 4.0  -switch IVF to bicarb drip for correction of acidosis  -acidosis correction will also correct K  -hold lisinopril and KCL supplements at this time  -use renal diet (low K), dose all meds for GFR  -cont daily labs    Thank you for this consult and allowing us to participate in the patient's care.         [1]   Past Surgical History:  Procedure Laterality Date    UMBILICAL HERNIA REPAIR  9/17/2019    Procedure: REPAIR, HERNIA, UMBILICAL- INCARCERATED;  Surgeon: Truong Daniels M.D.;  Location: SURGERY SAME DAY [facility];  Service: General    KNEE ARTHROPLASTY TOTAL Right 2014    HYSTERECTOMY, VAGINAL      VEIN STRIPPING     [2]   Social History  Tobacco Use    Smoking status: Never    Smokeless tobacco: Never   Vaping Use    Vaping status: Never Used   Substance Use Topics    Alcohol use: No    Drug use: No

## 2025-07-01 NOTE — ASSESSMENT & PLAN NOTE
ALEN on CKD Stage 3A   creatinine 5.72 (baseline 1.1), GFR 7, BUN 62  Oliguric  Likely 2/2 lasix vs bactrim vs dehydration 2/2 vomiting and diarrhea.  -Continuous LR 83 ml/hr  -Avoid nephrotoxins, NSAIDS, IV contrast  -Renal diet   -Dose all meds for eGFR <15  -Heparin for DVT PPx  -Nephrology consult in the AM

## 2025-07-02 ENCOUNTER — PHARMACY VISIT (OUTPATIENT)
Dept: PHARMACY | Facility: MEDICAL CENTER | Age: 75
End: 2025-07-02
Payer: COMMERCIAL

## 2025-07-02 VITALS
WEIGHT: 227.96 LBS | HEIGHT: 62 IN | SYSTOLIC BLOOD PRESSURE: 139 MMHG | OXYGEN SATURATION: 92 % | RESPIRATION RATE: 18 BRPM | BODY MASS INDEX: 41.95 KG/M2 | TEMPERATURE: 97.5 F | DIASTOLIC BLOOD PRESSURE: 77 MMHG | HEART RATE: 66 BPM

## 2025-07-02 PROBLEM — E87.5 HYPERKALEMIA: Status: RESOLVED | Noted: 2025-07-01 | Resolved: 2025-07-02

## 2025-07-02 PROBLEM — E87.20 METABOLIC ACIDOSIS: Status: RESOLVED | Noted: 2025-07-01 | Resolved: 2025-07-02

## 2025-07-02 PROBLEM — R19.7 WATERY DIARRHEA: Status: RESOLVED | Noted: 2025-07-01 | Resolved: 2025-07-02

## 2025-07-02 LAB
ANION GAP SERPL CALC-SCNC: 11 MMOL/L (ref 7–16)
ANION GAP SERPL CALC-SCNC: 12 MMOL/L (ref 7–16)
BASOPHILS # BLD AUTO: 0.5 % (ref 0–1.8)
BASOPHILS # BLD: 0.04 K/UL (ref 0–0.12)
BUN SERPL-MCNC: 36 MG/DL (ref 8–22)
BUN SERPL-MCNC: 38 MG/DL (ref 8–22)
CALCIUM SERPL-MCNC: 8.7 MG/DL (ref 8.5–10.5)
CALCIUM SERPL-MCNC: 9.1 MG/DL (ref 8.5–10.5)
CHLORIDE SERPL-SCNC: 106 MMOL/L (ref 96–112)
CHLORIDE SERPL-SCNC: 108 MMOL/L (ref 96–112)
CO2 SERPL-SCNC: 20 MMOL/L (ref 20–33)
CO2 SERPL-SCNC: 21 MMOL/L (ref 20–33)
CREAT SERPL-MCNC: 2.94 MG/DL (ref 0.5–1.4)
CREAT SERPL-MCNC: 3.15 MG/DL (ref 0.5–1.4)
EOSINOPHIL # BLD AUTO: 0.79 K/UL (ref 0–0.51)
EOSINOPHIL NFR BLD: 10.8 % (ref 0–6.9)
ERYTHROCYTE [DISTWIDTH] IN BLOOD BY AUTOMATED COUNT: 52.1 FL (ref 35.9–50)
GFR SERPLBLD CREATININE-BSD FMLA CKD-EPI: 15 ML/MIN/1.73 M 2
GFR SERPLBLD CREATININE-BSD FMLA CKD-EPI: 16 ML/MIN/1.73 M 2
GLUCOSE SERPL-MCNC: 102 MG/DL (ref 65–99)
GLUCOSE SERPL-MCNC: 96 MG/DL (ref 65–99)
HCT VFR BLD AUTO: 32.7 % (ref 37–47)
HGB BLD-MCNC: 10.6 G/DL (ref 12–16)
IMM GRANULOCYTES # BLD AUTO: 0.02 K/UL (ref 0–0.11)
IMM GRANULOCYTES NFR BLD AUTO: 0.3 % (ref 0–0.9)
LYMPHOCYTES # BLD AUTO: 2.7 K/UL (ref 1–4.8)
LYMPHOCYTES NFR BLD: 36.8 % (ref 22–41)
MAGNESIUM SERPL-MCNC: 1.5 MG/DL (ref 1.5–2.5)
MCH RBC QN AUTO: 29.9 PG (ref 27–33)
MCHC RBC AUTO-ENTMCNC: 32.4 G/DL (ref 32.2–35.5)
MCV RBC AUTO: 92.4 FL (ref 81.4–97.8)
MONOCYTES # BLD AUTO: 0.55 K/UL (ref 0–0.85)
MONOCYTES NFR BLD AUTO: 7.5 % (ref 0–13.4)
NEUTROPHILS # BLD AUTO: 3.23 K/UL (ref 1.82–7.42)
NEUTROPHILS NFR BLD: 44.1 % (ref 44–72)
NRBC # BLD AUTO: 0 K/UL
NRBC BLD-RTO: 0 /100 WBC (ref 0–0.2)
PHOSPHATE SERPL-MCNC: 3.4 MG/DL (ref 2.5–4.5)
PLATELET # BLD AUTO: 157 K/UL (ref 164–446)
PMV BLD AUTO: 9 FL (ref 9–12.9)
POTASSIUM SERPL-SCNC: 5 MMOL/L (ref 3.6–5.5)
POTASSIUM SERPL-SCNC: 5.1 MMOL/L (ref 3.6–5.5)
RBC # BLD AUTO: 3.54 M/UL (ref 4.2–5.4)
SODIUM SERPL-SCNC: 139 MMOL/L (ref 135–145)
SODIUM SERPL-SCNC: 139 MMOL/L (ref 135–145)
WBC # BLD AUTO: 7.3 K/UL (ref 4.8–10.8)

## 2025-07-02 PROCEDURE — 83735 ASSAY OF MAGNESIUM: CPT

## 2025-07-02 PROCEDURE — A9270 NON-COVERED ITEM OR SERVICE: HCPCS | Performed by: INTERNAL MEDICINE

## 2025-07-02 PROCEDURE — 96375 TX/PRO/DX INJ NEW DRUG ADDON: CPT

## 2025-07-02 PROCEDURE — 700102 HCHG RX REV CODE 250 W/ 637 OVERRIDE(OP): Performed by: INTERNAL MEDICINE

## 2025-07-02 PROCEDURE — 80048 BASIC METABOLIC PNL TOTAL CA: CPT

## 2025-07-02 PROCEDURE — 99239 HOSP IP/OBS DSCHRG MGMT >30: CPT

## 2025-07-02 PROCEDURE — A9270 NON-COVERED ITEM OR SERVICE: HCPCS

## 2025-07-02 PROCEDURE — 36415 COLL VENOUS BLD VENIPUNCTURE: CPT

## 2025-07-02 PROCEDURE — 700102 HCHG RX REV CODE 250 W/ 637 OVERRIDE(OP)

## 2025-07-02 PROCEDURE — 85025 COMPLETE CBC W/AUTO DIFF WBC: CPT

## 2025-07-02 PROCEDURE — 700111 HCHG RX REV CODE 636 W/ 250 OVERRIDE (IP)

## 2025-07-02 PROCEDURE — 96365 THER/PROPH/DIAG IV INF INIT: CPT

## 2025-07-02 PROCEDURE — 84100 ASSAY OF PHOSPHORUS: CPT

## 2025-07-02 PROCEDURE — RXMED WILLOW AMBULATORY MEDICATION CHARGE

## 2025-07-02 RX ORDER — SODIUM BICARBONATE 650 MG/1
1300 TABLET ORAL DAILY
Qty: 180 TABLET | Refills: 0 | Status: SHIPPED | OUTPATIENT
Start: 2025-07-03 | End: 2025-07-02

## 2025-07-02 RX ORDER — SODIUM BICARBONATE 650 MG/1
1300 TABLET ORAL DAILY
Qty: 180 TABLET | Refills: 0 | Status: SHIPPED | OUTPATIENT
Start: 2025-07-03

## 2025-07-02 RX ORDER — SODIUM BICARBONATE 650 MG/1
1300 TABLET ORAL DAILY
Status: DISCONTINUED | OUTPATIENT
Start: 2025-07-03 | End: 2025-07-02 | Stop reason: HOSPADM

## 2025-07-02 RX ADMIN — ATORVASTATIN CALCIUM 10 MG: 10 TABLET, FILM COATED ORAL at 05:11

## 2025-07-02 RX ADMIN — SODIUM ZIRCONIUM CYCLOSILICATE 10 G: 10 POWDER, FOR SUSPENSION ORAL at 10:58

## 2025-07-02 RX ADMIN — ASPIRIN 81 MG: 81 TABLET, COATED ORAL at 05:11

## 2025-07-02 RX ADMIN — SODIUM BICARBONATE 650 MG: 650 TABLET ORAL at 07:51

## 2025-07-02 RX ADMIN — HEPARIN SODIUM 5000 UNITS: 5000 INJECTION, SOLUTION INTRAVENOUS; SUBCUTANEOUS at 05:11

## 2025-07-02 ASSESSMENT — FIBROSIS 4 INDEX: FIB4 SCORE: 3.38

## 2025-07-02 NOTE — DISCHARGE SUMMARY
Discharge Summary    CHIEF COMPLAINT ON ADMISSION  Chief Complaint   Patient presents with    Abnormal Labs     Pt came in by walk in, pt was called in by her PCP regarding abnormal lab taken this morning,   Potassium 6.8       Reason for Admission  sent by MD; abnormal labs     Admission Date  7/1/2025    CODE STATUS  Full Code    HPI & HOSPITAL COURSE    75-year-old female with HTN, DVT/PE s/p IVC filter, CKD stage III who presented 7/1/2025 for abnormal labs.  Patient has been having vomiting and watery diarrhea over the last few days.  Has been also having lower leg swelling and so she was prescribed Lasix.  Has also been having UTI symptoms and so also recently started a 14-day course of Bactrim.  She stopped taking this 3 to 4 days prior to presentation 2/2 rashes and severe itching.  She was on potassium supplementation.  When she went to her PCP today got labs and it showed hyperkalemia and kidney failure.  Admitted for further management.    Patient was given Lokelma and required a bicarb drip.  Nephrology was following.  Patient improved on management.  Moving forward, patient's home lisinopril will be held until the ALEN completely resolves.  Recommend not to restart potassium supplementation until reassessment as outpatient and to stop Lasix.  Will be continued on sodium bicarb tablets.  Referral back to PCP also placed.  Recommend to repeat labs prior to seeing PCP.  Patient states she may have a follow-up in the next week but she is not sure.  Referral to nephrology outpatient placed.    Therefore, she is discharged in fair and stable condition to home with close outpatient follow-up.    The patient met 2-midnight criteria for an inpatient stay at the time of discharge.    Discharge Date  7/2/2025    FOLLOW UP ITEMS POST DISCHARGE  Follow-up with nephrology and PCP    DISCHARGE DIAGNOSES  Principal Problem (Resolved):    Hyperkalemia (POA: Yes)  Active Problems:    Aortic atherosclerosis (HCC) (Chronic)  (POA: Yes)      Overview: Chronic, controlled.      Tolerating/continue atorvastatin 10 mg daily.      Referring back to cardiology.    ALEN (acute kidney injury) (HCC) (POA: Unknown)    Hyperlipidemia (POA: Unknown)  Resolved Problems:    Metabolic acidosis (POA: Unknown)    Watery diarrhea (POA: Unknown)      FOLLOW UP  Future Appointments   Date Time Provider Department Center   7/7/2025 10:20 AM [facility]   7/9/2025  1:40 PM SHILA NguyenRSHIVAM [facility]     ANY [facility]  [address]  [facility] [id number] [phone]    Call        MEDICATIONS ON DISCHARGE     Medication List        START taking these medications        Instructions   sodium bicarbonate 650 MG Tabs  Start taking on: July 3, 2025  Commonly known as: Sodium Bicarbonate   Take 2 Tablets by mouth every day.  Dose: 1,300 mg            CONTINUE taking these medications        Instructions   aspirin 81 MG EC tablet   Take 81 mg by mouth every morning.  Dose: 81 mg     atorvastatin 10 MG Tabs  Commonly known as: Lipitor   Take 1 Tablet by mouth every evening.  Dose: 10 mg     VITAMIN D-3 PO   Take 1 Capsule by mouth every day.  Dose: 1 Capsule            STOP taking these medications      furosemide 20 MG Tabs  Commonly known as: Lasix     lisinopril 30 MG tablet  Commonly known as: Prinivil     potassium chloride SA 20 MEQ Tbcr  Commonly known as: Kdur     sulfamethoxazole-trimethoprim 800-160 MG tablet  Commonly known as: Bactrim DS              Allergies  Allergies[1]    DIET  Orders Placed This Encounter   Procedures    Diet Order Diet: 2 Gram Sodium; Second Modifier: (optional): Renal     Standing Status:   Standing     Number of Occurrences:   1     Diet::   2 Gram Sodium [7]     Second Modifier: (optional):   Renal [8]       ACTIVITY  As tolerated.  Weight bearing as tolerated    CONSULTATIONS  Nephrology    PROCEDURES  None    LABORATORY  Lab Results   Component Value Date    SODIUM 139  07/02/2025    POTASSIUM 5.0 07/02/2025    CHLORIDE 106 07/02/2025    CO2 21 07/02/2025    GLUCOSE 96 07/02/2025    BUN 36 (H) 07/02/2025    CREATININE 2.94 (H) 07/02/2025    CREATININE 0.7 05/17/2006        Lab Results   Component Value Date    WBC 7.3 07/02/2025    HEMOGLOBIN 10.6 (L) 07/02/2025    HEMATOCRIT 32.7 (L) 07/02/2025    PLATELETCT 157 (L) 07/02/2025        Total time of the discharge process exceeds 35 minutes.       [1]   Allergies  Allergen Reactions    Mustard [Allyl Isothiocyanate] Anaphylaxis    Prochlorperazine Edisylate [Compazine] Anaphylaxis    Doxycycline Nausea and Unspecified     Causes headache and nausea    Diflucan [Fluconazole] Swelling    Macrobid [Nitrofurantoin] Swelling    Sulfa Drugs Hives and Rash     Itchy rash and hives

## 2025-07-02 NOTE — CARE PLAN
The patient is Stable - Low risk of patient condition declining or worsening    Shift Goals  Clinical Goals: monitor labs, safety  Patient Goals: Rest  Family Goals: Updates    Progress made toward(s) clinical / shift goals:    Problem: Knowledge Deficit - Standard  Goal: Patient and family/care givers will demonstrate understanding of plan of care, disease process/condition, diagnostic tests and medications  Outcome: Progressing     Problem: Pain Management  Goal: Pain level will decrease to patient's comfort goal  Outcome: Progressing       06:45-14:54  Patient A&Ox4. Denies pain this morning. Compliant with medications. Stand by assist OOB. Bed locked in lowest position. Call light within reach. Safety precautions maintained.   11:57 - plan to discharge patient today. Telemetry removed and monitor tech made aware.   14:54 - patient discharged home.

## 2025-07-02 NOTE — PROGRESS NOTES
Monitor Summary  Rhythm: SB/SR  Rate: 42-73   Ectopy: (F) PVC's  Measurements: .16/.08/.36

## 2025-07-02 NOTE — PROGRESS NOTES
Discharge orders received.  Patient arrived to the discharge lounge.  PIV removed by bedside RN prior to arrival. AVS instructions given, medications reviewed and general discharge education provided to patient.  Follow up appointments discussed.  Patient verbalized understanding of dc instructions and prescriptions.  Patient signed discharge instructions.  Patient verbalized understanding and had all belongings with her.  Patient left with meds and family. Wished patient a speedy recovery.

## 2025-07-02 NOTE — PROGRESS NOTES
" [facility] -  PROGRESS NOTE    Date & Time:   7/2/2025  9:55 AM    Author:   Marcelo Jordan D.O.      REASON FOR CONSULTATION:   - ALEN/Management of acute and chronic conditions related to Nephrology      CHIEF COMPLAINT:   -  \"abnorma labs  \"      HISTORY OF PRESENT ILLNESS:    75Y  F w hx of HTN, DVT/PE s/p IVC filter, CKD3, TOMI presented after being called by PCM for abnormal labs. ER labs showed Cr of 5.2 and K of 6.8. Pt had been given bactrim for UTI for a 14 day course. She developed a rash and states she stopped about a week of taking it. Of note, pt also on lisinopril 30mg daily and KCL 20meq daily and enjoys eating tomatoes.   She also reporting some diarrhea over last few days.  Cr noted to be 1.36 on 6/10/25 prior to her ALEN  Pt was admitted, and given IVF and insulin and lokelma.     No Fever/Chills, no N/V, CP or SOB.  No melena, hematochezia, hematemesis.  No HA, visual changes, or abdominal pain. + diarrhea    DAILY NEPHROLOGY SUMMARY:  7/1: consult done  7/2: ALEN improving, hyperK resolved. Pt feels well.    REVIEW OF SYSTEMS:    10 point ROS was performed and is as per HPI or otherwise negative      PAST MEDICAL HISTORY:   Past Medical History:   Diagnosis Date    Acute non-recurrent frontal sinusitis 09/22/2022    Urinary incontinence 09/11/2019    Dental disorder 09/11/2019    upper denture    Cancer (HCC) 2012    skin    Diverticulitis     Hypertension     Morbid obesity (HCC)     Obesity     TOMI (obstructive sleep apnea)     PE (pulmonary thromboembolism) (HCC)     Urinary bladder disorder     Venous insufficiency of both lower extremities     VSD (ventricular septal defect and aortic arch hypoplasia           PAST SURGICAL HISTORY:   Past Surgical History[1]       FAMILY HISTORY:   Family History   Problem Relation Age of Onset    Lung Disease Mother         COPD     Heart Attack Father     No Known Problems Sister     No Known Problems Brother     No Known " "Problems Maternal Grandmother     No Known Problems Maternal Grandfather     No Known Problems Paternal Grandmother     Heart Attack Paternal Grandfather     Heart Disease Daughter     No Known Problems Daughter     Heart Disease Son        SOCIAL HISTORY:   Social History[2]         HOME MEDICATIONS: reviewed  Home Medications   Medication Sig Taking? Last Dose Authorizing Provider   Cholecalciferol (VITAMIN D-3 PO) Take 1 Capsule by mouth every day. Yes 6/30/2025 Morning Physician Outpatient   furosemide (LASIX) 20 MG Tab Take 20 mg by mouth 2 times a day. 14 day course completed Yes 6/24/2025 Physician Outpatient   atorvastatin (LIPITOR) 10 MG Tab Take 1 Tablet by mouth every evening. Yes 6/30/2025 Morning VALORIE NguyenPLeonardoRTAMARA.   lisinopril (PRINIVIL) 30 MG tablet Take 1 Tablet by mouth every day. Yes 6/30/2025 Morning VALORIE NguyenPLeonardoRTAMARA.   aspirin 81 MG EC tablet Take 81 mg by mouth every morning. Yes 6/30/2025 Morning [facility] Emergency Md Per Protocol, M.D.   potassium chloride SA (KDUR) 20 MEQ Tab CR Take 1 Tablet by mouth every day.  Patient not taking: Reported on 7/1/2025  Not Taking VALORIE NguyenP.RTAMARA.       ALLERGIES:  Mustard [allyl isothiocyanate], Prochlorperazine edisylate [compazine], Doxycycline, Diflucan [fluconazole], Macrobid [nitrofurantoin], and Sulfa drugs      VITAL SIGNS:  /72   Pulse 64   Temp 36.4 °C (97.5 °F) (Temporal)   Resp 18   Ht 1.57 m (5' 1.81\")   Wt 103 kg (227 lb 15.3 oz)   SpO2 95%   BMI 41.95 kg/m²     Physical Exam  Constitutional:       Appearance: Normal appearance. She is not toxic-appearing.   HENT:      Head: Normocephalic and atraumatic.      Mouth/Throat:      Mouth: Mucous membranes are moist.   Cardiovascular:      Rate and Rhythm: Normal rate and regular rhythm.   Pulmonary:      Effort: Pulmonary effort is normal.      Breath sounds: Normal breath sounds.   Musculoskeletal:      Right lower leg: No edema.      Left lower leg: No edema. "   Skin:     General: Skin is warm and dry.   Neurological:      Mental Status: She is alert and oriented to person, place, and time. Mental status is at baseline.         Access:      FLUID BALANCE:  In: 450   Out: 2200       LABS:  Recent Labs     07/01/25 1803 07/02/25  0033 07/02/25  0443   SODIUM 139 139 139   POTASSIUM 5.3 5.1 5.0   CHLORIDE 109 108 106   CO2 18* 20 21   GLUCOSE 115* 102* 96   BUN 46* 38* 36*   CREATININE 3.80* 3.15* 2.94*   CALCIUM 9.2 8.7 9.1      Recent Labs     07/01/25  1803 07/02/25  0033 07/02/25  0443   SODIUM 139 139 139   POTASSIUM 5.3 5.1 5.0   CHLORIDE 109 108 106   CO2 18* 20 21   GLUCOSE 115* 102* 96   BUN 46* 38* 36*   CREATININE 3.80* 3.15* 2.94*          IMAGING:  - Imaging studies reviewed      ASSESSMENTS:    -ALEN on CKD3, likely 2/2 bactrim and UTI    Cr 1.3 in 6/2025  -Hyperkalemia  -Acidosis  -HTN  -HLD  -Anemia    PLAN    -ALEN and K improving, no indication for RRT at this time  -finish lokelma 10gm x1 today  -stop bicarb drip, start maintenance sodium bicarb 1300mg PO daily  -continue holding lisinopril until ALEN resolves  -would NOT restart KCL supplementation until reassessment as outpt  -use renal diet (low K), dose all meds for GFR  -cont daily labs while inpt    Dispo: pt can DC from renal standpoint once otherwise medically cleared     Please page nephrology with any questions or concerns    Marcelo Jordan DO  Nephrologist   [facility]  [phone]           [1]   Past Surgical History:  Procedure Laterality Date    UMBILICAL HERNIA REPAIR  9/17/2019    Procedure: REPAIR, HERNIA, UMBILICAL- INCARCERATED;  Surgeon: Truong Daniels M.D.;  Location: SURGERY SAME DAY [facility];  Service: General    KNEE ARTHROPLASTY TOTAL Right 2014    HYSTERECTOMY, VAGINAL      VEIN STRIPPING     [2]   Social History  Tobacco Use    Smoking status: Never    Smokeless tobacco: Never   Vaping Use    Vaping status: Never Used   Substance Use Topics    Alcohol use: No     Drug use: No

## 2025-07-02 NOTE — HOSPITAL COURSE
75-year-old female with HTN, DVT/PE s/p IVC filter, CKD stage III who presented 7/1/2025 for abnormal labs.  Patient has been having vomiting and watery diarrhea over the last few days.  Has been also having lower leg swelling and so she was prescribed Lasix.  Has also been having UTI symptoms and so also recently started a 14-day course of Bactrim.  She stopped taking this 3 to 4 days prior to presentation 2/2 rashes and severe itching.  She was on potassium supplementation.  When she went to her PCP today got labs and it showed hyperkalemia and kidney failure.  Admitted for further management.    Patient was given Lokelma and required a bicarb drip.  Nephrology was following.  Patient improved on management.  Moving forward, patient's home lisinopril will be held until the ALEN completely resolves.  Recommend not to restart potassium supplementation until reassessment as outpatient and to stop Lasix.  Will be continued on sodium bicarb tablets.  Referral back to PCP also placed.  Recommend to repeat labs prior to seeing PCP.  Patient states she may have a follow-up in the next week but she is not sure.  Referral to nephrology outpatient placed.

## 2025-07-02 NOTE — CARE PLAN
The patient is Stable - Low risk of patient condition declining or worsening    Shift Goals  Clinical Goals: Monitor labs, VSS, Cardiac monitoring  Patient Goals: Rest  Family Goals: Updates    Progress made toward(s) clinical / shift goals:      Problem: Knowledge Deficit - Standard  Goal: Patient and family/care givers will demonstrate understanding of plan of care, disease process/condition, diagnostic tests and medications  Outcome: Progressing     Problem: Communication  Goal: The ability to communicate needs accurately and effectively will improve  Outcome: Progressing     Problem: Safety  Goal: Will remain free from injury  Outcome: Progressing  Goal: Will remain free from falls  Outcome: Progressing     Problem: Pain Management  Goal: Pain level will decrease to patient's comfort goal  Outcome: Progressing     Problem: Fluid Volume:  Goal: Will maintain balanced intake and output  Outcome: Progressing     Problem: Respiratory:  Goal: Respiratory status will improve  Outcome: Progressing     Problem: Bowel/Gastric:  Goal: Normal bowel function is maintained or improved  Outcome: Progressing     Problem: Urinary Elimination:  Goal: Ability to reestablish a normal urinary elimination pattern will improve  Outcome: Progressing     Problem: Mobility  Goal: Risk for activity intolerance will decrease  Outcome: Progressing     Problem: Medication  Goal: Compliance with prescribed medication will improve  Outcome: Progressing     Problem: Knowledge Deficit  Goal: Knowledge of disease process/condition, treatment plan, diagnostic tests, and medications will improve  Outcome: Progressing

## 2025-07-03 ENCOUNTER — PATIENT OUTREACH (OUTPATIENT)
Dept: MEDICAL GROUP | Facility: PHYSICIAN GROUP | Age: 75
End: 2025-07-03
Payer: MEDICARE

## 2025-07-03 NOTE — PROGRESS NOTES
Transitional Care Management  TCM Outreach Date and Time: Filed (7/3/2025  9:04 AM)    Discharge Questions  Actual Discharge Date: 07/02/25  Now that you are home, how are you feeling?: Good  Did you receive any new prescriptions?: Yes  Were you able to get them filled?: Yes  Meds to Bed or Pharmacy filled?: Meds to Bed  Do you have any questions about your current medications or new medications (Review Med Rec)?: No  Did you have any durable medical equipment ordered?: No  Do you have a follow up appointment scheduled with your PCP?: Yes  Appointment Date: 07/09/25  Appointment Time: 1340  Any issues or paperwork you wish to discuss with your PCP?: No  Are you (patient) able to get to the appointment?: Yes  If Home Health was ordered, have they contacted you (Patient): Not Applicable  Did you have enough support after your last discharge?: Yes  Does this patient qualify for the CCM program?: Yes (pt's chart routed to ccm rn)    Transitional Care  Number of attempts made to contact patient: 1  Current or previous attempts completed within two business days of discharge? : Yes  Provided education regarding treatment plan, medications, self-management, ADLs?: No  Has patient completed an Advanced Directive?: Yes  Is the patient's advanced directive on file?: Yes  Has the Care Manager's phone number provided?: No  Is there anything else I can help you with?: No    Discharge Summary  Chief Complaint: Abnormal Labs        Pt came in by walk in, pt was called in by her PCP regarding abnormal lab taken this morning,   Potassium 6.8  Admitting Diagnosis: sent by MD; abnormal labs  Discharge Diagnosis: Hyperkalemia

## 2025-07-05 LAB
ALBUMIN 24H MFR UR ELPH: 80.7 %
ALPHA1 GLOB 24H MFR UR ELPH: 6.5 %
ALPHA2 GLOB 24H MFR UR ELPH: 0.2 %
B-GLOBULIN 24H MFR UR ELPH: 2.2 %
COLLECT DURATION TIME SPEC: NORMAL HR
EER MONOCLONAL PROTEIN STUDY, 24 HOUR U Q5964: NORMAL
GAMMA GLOB 24H MFR UR ELPH: 10.4 %
INTERPRETATION UR IFE-IMP: NORMAL
M PROTEIN 24H MFR UR ELPH: 0 %
M PROTEIN 24H UR ELPH-MRATE: NORMAL MG/24 HRS
PROT 24H UR-MRATE: 19 MG/DL
PROT 24H UR-MRATE: NORMAL G/(24.H) (ref 40–150)
SPECIMEN VOL ?TM UR: NORMAL ML

## 2025-07-07 ENCOUNTER — HOSPITAL ENCOUNTER (OUTPATIENT)
Dept: RADIOLOGY | Facility: MEDICAL CENTER | Age: 75
End: 2025-07-07
Payer: MEDICARE

## 2025-07-07 DIAGNOSIS — R91.1 LUNG NODULE: ICD-10-CM

## 2025-07-07 PROCEDURE — 71250 CT THORAX DX C-: CPT

## 2025-07-08 ENCOUNTER — TELEPHONE (OUTPATIENT)
Dept: MEDICAL GROUP | Facility: PHYSICIAN GROUP | Age: 75
End: 2025-07-08
Payer: MEDICARE

## 2025-07-08 ENCOUNTER — HOSPITAL ENCOUNTER (OUTPATIENT)
Dept: LAB | Facility: MEDICAL CENTER | Age: 75
End: 2025-07-08
Payer: MEDICARE

## 2025-07-08 DIAGNOSIS — N17.9 AKI (ACUTE KIDNEY INJURY) (HCC): ICD-10-CM

## 2025-07-08 DIAGNOSIS — N30.90 CYSTITIS: Primary | ICD-10-CM

## 2025-07-08 LAB
ALBUMIN SERPL BCP-MCNC: 3.7 G/DL (ref 3.2–4.9)
ALBUMIN/GLOB SERPL: 1.2 G/DL
ALP SERPL-CCNC: 74 U/L (ref 30–99)
ALT SERPL-CCNC: 8 U/L (ref 2–50)
ANION GAP SERPL CALC-SCNC: 13 MMOL/L (ref 7–16)
AST SERPL-CCNC: 18 U/L (ref 12–45)
BILIRUB SERPL-MCNC: 0.3 MG/DL (ref 0.1–1.5)
BUN SERPL-MCNC: 23 MG/DL (ref 8–22)
CALCIUM ALBUM COR SERPL-MCNC: 8.9 MG/DL (ref 8.5–10.5)
CALCIUM SERPL-MCNC: 8.7 MG/DL (ref 8.5–10.5)
CHLORIDE SERPL-SCNC: 106 MMOL/L (ref 96–112)
CO2 SERPL-SCNC: 22 MMOL/L (ref 20–33)
CREAT SERPL-MCNC: 1.47 MG/DL (ref 0.5–1.4)
GFR SERPLBLD CREATININE-BSD FMLA CKD-EPI: 37 ML/MIN/1.73 M 2
GLOBULIN SER CALC-MCNC: 3.1 G/DL (ref 1.9–3.5)
GLUCOSE SERPL-MCNC: 104 MG/DL (ref 65–99)
POTASSIUM SERPL-SCNC: 4.3 MMOL/L (ref 3.6–5.5)
PROT SERPL-MCNC: 6.8 G/DL (ref 6–8.2)
SODIUM SERPL-SCNC: 141 MMOL/L (ref 135–145)

## 2025-07-08 PROCEDURE — 80053 COMPREHEN METABOLIC PANEL: CPT

## 2025-07-08 PROCEDURE — 36415 COLL VENOUS BLD VENIPUNCTURE: CPT

## 2025-07-08 NOTE — TELEPHONE ENCOUNTER
Caller Name: [phone]    How would the patient prefer to be contacted with a response: phone  Lab order reqCaller Name: [email or web address]      How would the patient prefer to be contacted with a response:     Spoke to patient and let her know that [facility] placed a lab order for her to get her kidney function (non-fasting) checked prior to her appointment on 07/09. Pt verbalized understood.

## 2025-07-09 ENCOUNTER — OFFICE VISIT (OUTPATIENT)
Dept: MEDICAL GROUP | Facility: PHYSICIAN GROUP | Age: 75
End: 2025-07-09
Payer: MEDICARE

## 2025-07-09 ENCOUNTER — TELEPHONE (OUTPATIENT)
Dept: HEALTH INFORMATION MANAGEMENT | Facility: OTHER | Age: 75
End: 2025-07-09
Payer: MEDICARE

## 2025-07-09 VITALS
SYSTOLIC BLOOD PRESSURE: 124 MMHG | OXYGEN SATURATION: 95 % | RESPIRATION RATE: 9 BRPM | HEART RATE: 70 BPM | WEIGHT: 232.2 LBS | HEIGHT: 65 IN | DIASTOLIC BLOOD PRESSURE: 72 MMHG | BODY MASS INDEX: 38.69 KG/M2 | TEMPERATURE: 98.1 F

## 2025-07-09 DIAGNOSIS — N18.32 STAGE 3B CHRONIC KIDNEY DISEASE: Primary | ICD-10-CM

## 2025-07-09 DIAGNOSIS — Z09 HOSPITAL DISCHARGE FOLLOW-UP: ICD-10-CM

## 2025-07-09 DIAGNOSIS — I70.0 AORTIC ATHEROSCLEROSIS (HCC): ICD-10-CM

## 2025-07-09 DIAGNOSIS — Q21.0 VENTRICULAR SEPTAL DEFECT: ICD-10-CM

## 2025-07-09 RX ORDER — ATORVASTATIN CALCIUM 20 MG/1
20 TABLET, FILM COATED ORAL NIGHTLY
Qty: 100 TABLET | Refills: 3 | Status: SHIPPED | OUTPATIENT
Start: 2025-07-09 | End: 2026-08-13

## 2025-07-09 ASSESSMENT — FIBROSIS 4 INDEX: FIB4 SCORE: 3.04

## 2025-07-09 NOTE — PROGRESS NOTES
Subjective:     Fior Ray is a 75 y.o. female who presents for Hospital Follow-up.    Transitional Care Management  TCM Outreach Date and Time: Filed (7/3/2025  9:04 AM)    Discharge Questions  Actual Discharge Date: 07/02/25  Now that you are home, how are you feeling?: Good  Did you receive any new prescriptions?: Yes  Were you able to get them filled?: Yes  Meds to Bed or Pharmacy filled?: Meds to Bed  Do you have any questions about your current medications or new medications (Review Med Rec)?: No  Did you have any durable medical equipment ordered?: No  Do you have a follow up appointment scheduled with your PCP?: Yes  Appointment Date: 07/09/25  Appointment Time: 1340  Any issues or paperwork you wish to discuss with your PCP?: No  Are you (patient) able to get to the appointment?: Yes  If Home Health was ordered, have they contacted you (Patient): Not Applicable  Did you have enough support after your last discharge?: Yes  Does this patient qualify for the CCM program?: Yes (pt's chart routed to ccm rn)    Transitional Care  Number of attempts made to contact patient: 1  Current or previous attempts completed within two business days of discharge? : Yes  Provided education regarding treatment plan, medications, self-management, ADLs?: No  Has patient completed an Advanced Directive?: Yes  Is the patient's advanced directive on file?: Yes  Has the Care Manager's phone number provided?: No  Is there anything else I can help you with?: No    Discharge Summary  Chief Complaint: Abnormal Labs        Pt came in by walk in, pt was called in by her PCP regarding abnormal lab taken this morning,   Potassium 6.8  Admitting Diagnosis: sent by MD; abnormal labs  Discharge Diagnosis: Hyperkalemia        HPI:   History of Present Illness  The patient is a 75-year-old female presenting for transitional care follow-up after hospitalization for hyperkalemia. Prior to her hospitalization, she was treated for a  urinary tract infection (UTI) with trimethoprim-sulfamethoxazole (Bactrim), which was discontinued after 7 days due to the development of a rash and pruritus. She was admitted with symptoms of vomiting, watery diarrhea, and hyperkalemia, and received treatment with sodium zirconium cyclosilicate (Lokelma) and a bicarbonate infusion. Upon discharge, she was prescribed sodium bicarbonate and instructed to discontinue furosemide (Lasix) and lisinopril due to acute kidney injury (ALEN) related to fluid imbalance. Her glomerular filtration rate (GFR) improved to 37 mL/min/1.73m².    Since discharge, the patient reports feeling well, with no nausea or vomiting. She notes occasional episodes of diarrhea with increased stool liquidity, which she attributes to increased fluid intake, including two bottles of water daily, green tea, and regular tea. She inquires about the appropriateness of cranberry juice consumption. Her dietary modifications include avoidance of potassium-rich foods such as potatoes, oranges, and tomatoes, and limitation of beef and pork intake. She denies night sweats. A nephrology follow-up is scheduled for Monday.    The patient reports occasional snoring but denies daytime fatigue or a known diagnosis of sleep apnea. She underwent a home sleep study several years ago in [place], though the results are unknown. She expresses discomfort with the use of an oxygen mask. She reports she would not wear a CPAP if she did have sleep apnea.     She tolerates atorvastatin without experiencing myalgia or muscle cramps. Her last cardiology evaluation is scheduled for March 2024.    Trimethoprim-sulfamethoxazole was discontinued after 7 doses due to rash and pruritus, and she has been advised to avoid future use of this medication.     The patient has an upcoming urogynecology appointment on Tuesday for evaluation of urinary incontinence, which she reports has resolved. She continues to wear a protective pad for  "reassurance.      Current medicines (including reconciliation performed today)  Current Medications[1]    Allergies:   Mustard [allyl isothiocyanate], Prochlorperazine edisylate [compazine], Doxycycline, Diflucan [fluconazole], Macrobid [nitrofurantoin], and Sulfa drugs    Social History[2]    ROS:  .Otherwise negative  No fevers, chills, urinary urgency or frequency    Objective:     Vitals:    07/09/25 1332   BP: 124/72   BP Location: Right arm   Patient Position: Sitting   Pulse: 70   Resp: (!) 9   Temp: 36.7 °C (98.1 °F)   TempSrc: Temporal   SpO2: 95%   Weight: 105 kg (232 lb 3.2 oz)   Height: 1.651 m (5' 5\")     Body mass index is 38.64 kg/m².    Physical Exam:  Physical Exam  Constitutional:       Appearance: Normal appearance.   Eyes:      Extraocular Movements: Extraocular movements intact.   Pulmonary:      Effort: Pulmonary effort is normal.   Musculoskeletal:      Cervical back: Normal range of motion.   Neurological:      General: No focal deficit present.      Mental Status: She is alert and oriented to person, place, and time.   Psychiatric:         Mood and Affect: Mood normal.         Behavior: Behavior normal.         Assessment and Plan:     Assessment & Plan  1. Hyperkalemia: Improved.  - GFR 37 on recent labs. Continue sodium bicarbonate. Maintain current diet and hydration. Discontinue lisinopril until nephrology follow-up. Provide lab results to nephrologist.    2. Pulmonary hypertension: New finding on CT of chest. Elevated stopbang score, however patient declines a sleep study. Patient did not get echocardiogram updated per cardiology order last May of 2024 so this will be re-ordered.     3. Lung nodule. Resolved.   - CT chest findings include stable 3 mm left lower lobe nodule and pulmonary artery calcification. No immediate intervention required. Monitor for changes. 6mm nodule of concern has resolved.     3. Atherosclerosis: Chronic, significant plaque noted on CT chest. Increase " atorvastatin to 20mg QHS. Recheck lipids in 9 months.   - CT findings indicate significant plaque buildup. Increase atorvastatin to 20 mg daily. Order updated echocardiogram (last performed 7 years ago). Monitor cardiovascular symptoms.    4. Suspected sleep apnea.   STOPBANG - Sleep Apnea Screening      Flowsheet Row Most Recent Value   S - Have you been told that you SNORE? Yes   T - Are you often TIRED during the day? No   O - Do you know if you stop breathing or has anyone witnessed you stop breathing while you were asleep? (OBSTRUCTION) No   P - Do you have high blood PRESSURE or on medication to control high blood pressure? Yes   B - Is your Body Mass Index greater than 35? (BMI) Yes   A - Are you 50 years old or older? (AGE) Yes   N - Are you a male with a NECK circumference greater than 17 inches, or a female with a neck circumference greater than 16 inches? No   G - Are you male? (GENDER) No   STOPBANG Total Score 4   TOMI Risk Intermediate Risk     - Enlarged pulmonary artery noted on CT suggests possible sleep apnea. Risk factors include age >50 and snoring. Declined home sleep study. Monitor for symptoms.    Follow-up  - Nephrology follow-up scheduled.  - Echocardiogram ordered.      - Chart and discharge summary were reviewed.   - Hospitalization and results reviewed with patient.   - Medications reviewed including instructions regarding high risk medications, dosing and side effects.  - Recommended Services: No services needed at this time  - Advance directive/POLST on file?  No     Follow-up:Return in about 4 weeks (around 8/6/2025).    Face-to-face transitional care management services with HIGH (today's visit is within days post discharge & LACE+ score 59+) medical decision complexity were provided.            [1]   Current Outpatient Medications   Medication Sig Dispense Refill    atorvastatin (LIPITOR) 20 MG Tab Take 1 Tablet by mouth every evening. 100 Tablet 3    sodium bicarbonate (SODIUM  BICARBONATE) 650 MG Tab Take 2 Tablets by mouth every day. 180 Tablet 0    Cholecalciferol (VITAMIN D-3 PO) Take 1 Capsule by mouth every day.      aspirin 81 MG EC tablet Take 81 mg by mouth every morning.       No current facility-administered medications for this visit.   [2]   Social History  Tobacco Use    Smoking status: Never    Smokeless tobacco: Never   Vaping Use    Vaping status: Never Used   Substance Use Topics    Alcohol use: No    Drug use: No

## 2025-07-17 ENCOUNTER — GYNECOLOGY VISIT (OUTPATIENT)
Dept: GYNECOLOGY | Facility: CLINIC | Age: 75
End: 2025-07-17
Payer: MEDICARE

## 2025-07-17 VITALS
HEIGHT: 60 IN | DIASTOLIC BLOOD PRESSURE: 86 MMHG | HEART RATE: 82 BPM | SYSTOLIC BLOOD PRESSURE: 102 MMHG | WEIGHT: 231.5 LBS | BODY MASS INDEX: 45.45 KG/M2

## 2025-07-17 DIAGNOSIS — N39.41 URGE INCONTINENCE: ICD-10-CM

## 2025-07-17 DIAGNOSIS — N95.8 GENITOURINARY SYNDROME OF MENOPAUSE: ICD-10-CM

## 2025-07-17 DIAGNOSIS — N39.0 RECURRENT UTI: Primary | ICD-10-CM

## 2025-07-17 LAB
APPEARANCE UR: NORMAL
BILIRUB UR STRIP-MCNC: NEGATIVE MG/DL
COLOR UR AUTO: NORMAL
GLUCOSE UR STRIP.AUTO-MCNC: NEGATIVE MG/DL
KETONES UR STRIP.AUTO-MCNC: NEGATIVE MG/DL
LEUKOCYTE ESTERASE UR QL STRIP.AUTO: NORMAL
NITRITE UR QL STRIP.AUTO: NEGATIVE
PH UR STRIP.AUTO: NEGATIVE [PH] (ref 5–8)
PROT UR QL STRIP: NEGATIVE MG/DL
RBC UR QL AUTO: NORMAL
SP GR UR STRIP.AUTO: <=1.005
UROBILINOGEN UR STRIP-MCNC: 0.2 MG/DL

## 2025-07-17 PROCEDURE — 3074F SYST BP LT 130 MM HG: CPT | Performed by: STUDENT IN AN ORGANIZED HEALTH CARE EDUCATION/TRAINING PROGRAM

## 2025-07-17 PROCEDURE — 3079F DIAST BP 80-89 MM HG: CPT | Performed by: STUDENT IN AN ORGANIZED HEALTH CARE EDUCATION/TRAINING PROGRAM

## 2025-07-17 PROCEDURE — 81002 URINALYSIS NONAUTO W/O SCOPE: CPT | Performed by: STUDENT IN AN ORGANIZED HEALTH CARE EDUCATION/TRAINING PROGRAM

## 2025-07-17 PROCEDURE — 99204 OFFICE O/P NEW MOD 45 MIN: CPT | Performed by: STUDENT IN AN ORGANIZED HEALTH CARE EDUCATION/TRAINING PROGRAM

## 2025-07-17 RX ORDER — ESTRADIOL 0.1 MG/G
CREAM VAGINAL
Qty: 1 EACH | Refills: 6 | Status: SHIPPED | OUTPATIENT
Start: 2025-07-17

## 2025-07-17 ASSESSMENT — FIBROSIS 4 INDEX: FIB4 SCORE: 3.04

## 2025-07-17 NOTE — PROGRESS NOTES
PT here today for consult   Ref for urinary frequency  Hysterectomy yes  UTI Symtoms no  Good #: [phone]  PVR : 10  mL   Pharmacy [facility] [address] [facility], [place] - [address] [facility] [[id number]]

## 2025-08-29 ENCOUNTER — TELEPHONE (OUTPATIENT)
Dept: MEDICAL GROUP | Facility: PHYSICIAN GROUP | Age: 75
End: 2025-08-29
Payer: MEDICARE

## (undated) DEVICE — GVL 3 STAT DISPOSABLE - (10/BX)

## (undated) DEVICE — ELECTRODE DUAL RETURN W/ CORD - (50/PK)

## (undated) DEVICE — SUTURE GENERAL

## (undated) DEVICE — SUTURE 3-0 VICRYL PLUS SH - 8X 18 INCH (12/BX)

## (undated) DEVICE — HEMOSTAT ARISTA PWD 3 GRAM - (5/CA)

## (undated) DEVICE — SUTURE 0 ETHIBOND CT-1 - (12/BX) 18 INCH

## (undated) DEVICE — PROTECTOR ULNA NERVE - (36PR/CA)

## (undated) DEVICE — HEAD HOLDER JUNIOR/ADULT

## (undated) DEVICE — MASK ANESTHESIA ADULT  - (100/CA)

## (undated) DEVICE — KIT ANESTHESIA W/CIRCUIT & 3/LT BAG W/FILTER (20EA/CA)

## (undated) DEVICE — SUTURE 4-0 VICRYL PLUS FS-2 - 27 INCH (36/BX)

## (undated) DEVICE — PAD LAP STERILE 18 X 18 - (5/PK 40PK/CA)

## (undated) DEVICE — DRAPE LAPAROTOMY T SHEET - (12EA/CA)

## (undated) DEVICE — ELECTRODE 850 FOAM ADHESIVE - HYDROGEL RADIOTRNSPRNT (50/PK)

## (undated) DEVICE — PACK MINOR BASIN - (2EA/CA)

## (undated) DEVICE — SET LEADWIRE 5 LEAD BEDSIDE DISPOSABLE ECG (1SET OF 5/EA)

## (undated) DEVICE — TUBE CONNECTING SUCTION - CLEAR PLASTIC STERILE 72 IN (50EA/CA)

## (undated) DEVICE — SUTURE 3-0 VICRYL PLUS - 12 X 18 INCH (12/BX)

## (undated) DEVICE — SENSOR SPO2 NEO LNCS ADHESIVE (20/BX) SEE USER NOTES

## (undated) DEVICE — TUBE E-T HI-LO CUFF 7.0MM (10EA/PK)

## (undated) DEVICE — CHLORAPREP 26 ML APPLICATOR - ORANGE TINT(25/CA)

## (undated) DEVICE — CANISTER SUCTION RIGID RED 1500CC (40EA/CA)

## (undated) DEVICE — LACTATED RINGERS INJ 1000 ML - (14EA/CA 60CA/PF)

## (undated) DEVICE — CATHETER IV 20 GA X 1-1/4 ---SURG.& SDS ONLY--- (50EA/BX)

## (undated) DEVICE — MAT PATIENT POSITIONING PREVALON

## (undated) DEVICE — CANISTER SUCTION 3000ML MECHANICAL FILTER AUTO SHUTOFF MEDI-VAC NONSTERILE LF DISP  (40EA/CA)

## (undated) DEVICE — GLOVE BIOGEL SZ 7.5 SURGICAL PF LTX - (50PR/BX 4BX/CA)

## (undated) DEVICE — ADHESIVE DERMABOND HVD MINI (12EA/BX)

## (undated) DEVICE — TUBING CLEARLINK DUO-VENT - C-FLO (48EA/CA)

## (undated) DEVICE — SUCTION INSTRUMENT YANKAUER BULBOUS TIP W/O VENT (50EA/CA)

## (undated) DEVICE — KIT  I.V. START (100EA/CA)

## (undated) DEVICE — SET EXTENSION WITH 2 PORTS (48EA/CA) ***PART #2C8610 IS A SUBSTITUTE*****

## (undated) DEVICE — SODIUM CHL IRRIGATION 0.9% 1000ML (12EA/CA)

## (undated) DEVICE — GLOVE SZ 7 BIOGEL PI MICRO - PF LF (50PR/BX 4BX/CA)

## (undated) DEVICE — MANIFOLD NEPTUNE 1 PORT (20/PK)